# Patient Record
Sex: MALE | Race: BLACK OR AFRICAN AMERICAN
[De-identification: names, ages, dates, MRNs, and addresses within clinical notes are randomized per-mention and may not be internally consistent; named-entity substitution may affect disease eponyms.]

---

## 2017-02-27 ENCOUNTER — PRIOR ORIGINAL RECORDS (OUTPATIENT)
Dept: OTHER | Age: 67
End: 2017-02-27

## 2017-03-02 ENCOUNTER — APPOINTMENT (OUTPATIENT)
Dept: GENERAL RADIOLOGY | Facility: HOSPITAL | Age: 67
End: 2017-03-02
Payer: MEDICARE

## 2017-03-02 ENCOUNTER — HOSPITAL ENCOUNTER (EMERGENCY)
Facility: HOSPITAL | Age: 67
Discharge: HOME | End: 2017-03-02
Payer: MEDICARE

## 2017-03-02 VITALS
HEART RATE: 76 BPM | BODY MASS INDEX: 30.8 KG/M2 | RESPIRATION RATE: 16 BRPM | TEMPERATURE: 100 F | WEIGHT: 240 LBS | HEIGHT: 74 IN | OXYGEN SATURATION: 95 % | SYSTOLIC BLOOD PRESSURE: 130 MMHG | DIASTOLIC BLOOD PRESSURE: 80 MMHG

## 2017-03-02 DIAGNOSIS — J40 BRONCHITIS: Primary | ICD-10-CM

## 2017-03-02 DIAGNOSIS — J98.01 ACUTE BRONCHOSPASM: ICD-10-CM

## 2017-03-02 LAB
ADENOVIRUS PCR:: NEGATIVE
B PERT DNA SPEC QL NAA+PROBE: NEGATIVE
C PNEUM DNA SPEC QL NAA+PROBE: NEGATIVE
CORONAVIRUS 229E PCR:: NEGATIVE
CORONAVIRUS HKU1 PCR:: NEGATIVE
CORONAVIRUS NL63 PCR:: NEGATIVE
CORONAVIRUS OC43 PCR:: NEGATIVE
FLUAV H1 2009 PAND RNA SPEC QL NAA+PROBE: NEGATIVE
FLUAV H1 RNA SPEC QL NAA+PROBE: NEGATIVE
FLUAV H3 RNA SPEC QL NAA+PROBE: POSITIVE
FLUAV RNA SPEC QL NAA+PROBE: POSITIVE
FLUBV RNA SPEC QL NAA+PROBE: NEGATIVE
METAPNEUMOVIRUS PCR:: NEGATIVE
MYCOPLASMA PNEUMONIA PCR:: NEGATIVE
PARAINFLUENZA 1 PCR:: NEGATIVE
PARAINFLUENZA 2 PCR:: NEGATIVE
PARAINFLUENZA 3 PCR:: NEGATIVE
PARAINFLUENZA 4 PCR:: NEGATIVE
RHINOVIRUS/ENTERO PCR:: NEGATIVE
RSV RNA SPEC QL NAA+PROBE: NEGATIVE

## 2017-03-02 PROCEDURE — 99284 EMERGENCY DEPT VISIT MOD MDM: CPT

## 2017-03-02 PROCEDURE — 87486 CHLMYD PNEUM DNA AMP PROBE: CPT | Performed by: EMERGENCY MEDICINE

## 2017-03-02 PROCEDURE — 87581 M.PNEUMON DNA AMP PROBE: CPT | Performed by: EMERGENCY MEDICINE

## 2017-03-02 PROCEDURE — 87633 RESP VIRUS 12-25 TARGETS: CPT | Performed by: EMERGENCY MEDICINE

## 2017-03-02 PROCEDURE — 71020 XR CHEST PA + LAT CHEST (CPT=71020): CPT

## 2017-03-02 PROCEDURE — 87798 DETECT AGENT NOS DNA AMP: CPT | Performed by: EMERGENCY MEDICINE

## 2017-03-02 PROCEDURE — 94640 AIRWAY INHALATION TREATMENT: CPT

## 2017-03-02 RX ORDER — LOSARTAN POTASSIUM 100 MG/1
TABLET ORAL DAILY
COMMUNITY
End: 2017-10-30 | Stop reason: DRUGHIGH

## 2017-03-02 RX ORDER — IPRATROPIUM BROMIDE AND ALBUTEROL SULFATE 2.5; .5 MG/3ML; MG/3ML
3 SOLUTION RESPIRATORY (INHALATION) ONCE
Status: COMPLETED | OUTPATIENT
Start: 2017-03-02 | End: 2017-03-02

## 2017-03-02 RX ORDER — POTASSIUM CITRATE 10 MEQ/1
TABLET, EXTENDED RELEASE ORAL
COMMUNITY
End: 2017-10-30 | Stop reason: DRUGHIGH

## 2017-03-02 RX ORDER — MECLIZINE HYDROCHLORIDE 25 MG/1
25 TABLET ORAL 3 TIMES DAILY PRN
COMMUNITY
End: 2018-04-13 | Stop reason: ALTCHOICE

## 2017-03-02 RX ORDER — PANTOPRAZOLE SODIUM 40 MG/1
40 TABLET, DELAYED RELEASE ORAL
COMMUNITY
End: 2017-10-30

## 2017-03-02 RX ORDER — ACETAMINOPHEN 500 MG
1000 TABLET ORAL ONCE
Status: COMPLETED | OUTPATIENT
Start: 2017-03-02 | End: 2017-03-02

## 2017-03-02 RX ORDER — FUROSEMIDE 40 MG/1
40 TABLET ORAL 2 TIMES DAILY
Status: ON HOLD | COMMUNITY
End: 2018-02-23

## 2017-03-02 RX ORDER — TAMSULOSIN HYDROCHLORIDE 0.4 MG/1
CAPSULE ORAL DAILY
COMMUNITY
End: 2017-10-30

## 2017-03-02 RX ORDER — METOPROLOL SUCCINATE 100 MG/1
150 TABLET, EXTENDED RELEASE ORAL DAILY
COMMUNITY
End: 2017-10-30 | Stop reason: DRUGHIGH

## 2017-03-02 RX ORDER — NITROGLYCERIN 0.4 MG/1
0.4 TABLET SUBLINGUAL EVERY 5 MIN PRN
COMMUNITY
End: 2018-03-01

## 2017-03-02 RX ORDER — ACETAMINOPHEN AND CODEINE PHOSPHATE 300; 30 MG/1; MG/1
1 TABLET ORAL EVERY 4 HOURS PRN
COMMUNITY
End: 2017-10-30

## 2017-03-02 RX ORDER — SIMVASTATIN 5 MG
80 TABLET ORAL NIGHTLY
COMMUNITY
End: 2017-10-30 | Stop reason: DRUGHIGH

## 2017-03-02 RX ORDER — DIPHENHYDRAMINE HCL 25 MG
25 CAPSULE ORAL EVERY 6 HOURS PRN
COMMUNITY
End: 2018-04-13 | Stop reason: ALTCHOICE

## 2017-03-02 RX ORDER — PREDNISONE 20 MG/1
40 TABLET ORAL DAILY
Qty: 6 TABLET | Refills: 0 | Status: SHIPPED | OUTPATIENT
Start: 2017-03-02 | End: 2017-03-05

## 2017-03-02 NOTE — ED INITIAL ASSESSMENT (HPI)
Pt states 3 days ago he had some abdominal pain with associated cough but no fever. The following day the abdominal pain resolved spontaneously but the cough persisted. He states the cough is worse now since it has become productive of whitish phlegm.

## 2017-03-03 NOTE — ED PROVIDER NOTES
Patient Seen in: [facility] AND [facility] Emergency Department    History   Patient presents with:  Cough    Stated Complaint: cough    HPI    73 y/o male w/ a cardiac hx on aspirin with a h/o asthma w/ home inhaler who initially 3 days ago had some epigastric 0.4 mg under the tongue every 5 (five) minutes as needed for Chest pain. predniSONE 20 MG Oral Tab,  Take 2 tablets (40 mg total) by mouth daily. No family history on file.       Smoking Status: Never Smoker                        Review of Systems (cpt=71020)    3/2/2017  PROCEDURE: XR CHEST PA + LAT CHEST (CPT=71020)  COMPARISON: [facility], X CHEST PORTABLE, 2/15/2015, 23:56. INDICATIONS: Dyspnea and productive cough x3 days. TECHNIQUE:   Two views.    FINDINGS: CARDIAC/VASC: Ther

## 2017-03-03 NOTE — ED NOTES
PT safe to DC home per MD. Rina Point to dress self. DC teaching done, pt verbalizes understanding. Ambulatory with steady gait to exit.

## 2017-04-20 ENCOUNTER — PRIOR ORIGINAL RECORDS (OUTPATIENT)
Dept: OTHER | Age: 67
End: 2017-04-20

## 2017-05-08 ENCOUNTER — PRIOR ORIGINAL RECORDS (OUTPATIENT)
Dept: OTHER | Age: 67
End: 2017-05-08

## 2017-05-08 ENCOUNTER — MYAURORA ACCOUNT LINK (OUTPATIENT)
Dept: OTHER | Age: 67
End: 2017-05-08

## 2017-08-28 ENCOUNTER — PRIOR ORIGINAL RECORDS (OUTPATIENT)
Dept: OTHER | Age: 67
End: 2017-08-28

## 2017-09-22 ENCOUNTER — LAB ENCOUNTER (OUTPATIENT)
Dept: LAB | Facility: HOSPITAL | Age: 67
End: 2017-09-22
Attending: INTERNAL MEDICINE
Payer: MEDICARE

## 2017-09-22 DIAGNOSIS — E78.00 ELEVATED CHOLESTEROL: Primary | ICD-10-CM

## 2017-09-22 LAB
ALT SERPL-CCNC: 14 U/L (ref 17–63)
ANION GAP SERPL CALC-SCNC: 7 MMOL/L (ref 0–18)
AST SERPL-CCNC: 20 U/L (ref 15–41)
BUN SERPL-MCNC: 11 MG/DL (ref 8–20)
BUN/CREAT SERPL: 11.3 (ref 10–20)
CALCIUM SERPL-MCNC: 8.9 MG/DL (ref 8.5–10.5)
CHLORIDE SERPL-SCNC: 105 MMOL/L (ref 95–110)
CHOLEST SERPL-MCNC: 90 MG/DL (ref 110–200)
CO2 SERPL-SCNC: 26 MMOL/L (ref 22–32)
CREAT SERPL-MCNC: 0.97 MG/DL (ref 0.5–1.5)
GLUCOSE SERPL-MCNC: 80 MG/DL (ref 70–99)
HDLC SERPL-MCNC: 32 MG/DL
LDLC SERPL CALC-MCNC: 32 MG/DL (ref 0–99)
NONHDLC SERPL-MCNC: 58 MG/DL
OSMOLALITY UR CALC.SUM OF ELEC: 284 MOSM/KG (ref 275–295)
POTASSIUM SERPL-SCNC: 3.8 MMOL/L (ref 3.3–5.1)
SODIUM SERPL-SCNC: 138 MMOL/L (ref 136–144)
TRIGL SERPL-MCNC: 128 MG/DL (ref 1–149)

## 2017-09-22 PROCEDURE — 80048 BASIC METABOLIC PNL TOTAL CA: CPT

## 2017-09-22 PROCEDURE — 84450 TRANSFERASE (AST) (SGOT): CPT

## 2017-09-22 PROCEDURE — 80061 LIPID PANEL: CPT

## 2017-09-22 PROCEDURE — 84460 ALANINE AMINO (ALT) (SGPT): CPT

## 2017-09-22 PROCEDURE — 36415 COLL VENOUS BLD VENIPUNCTURE: CPT

## 2017-10-30 ENCOUNTER — OFFICE VISIT (OUTPATIENT)
Dept: FAMILY MEDICINE CLINIC | Facility: CLINIC | Age: 67
End: 2017-10-30

## 2017-10-30 VITALS
RESPIRATION RATE: 16 BRPM | HEIGHT: 74 IN | SYSTOLIC BLOOD PRESSURE: 132 MMHG | DIASTOLIC BLOOD PRESSURE: 78 MMHG | HEART RATE: 54 BPM | BODY MASS INDEX: 32.34 KG/M2 | TEMPERATURE: 97 F | WEIGHT: 252 LBS

## 2017-10-30 DIAGNOSIS — I10 ESSENTIAL HYPERTENSION: Primary | ICD-10-CM

## 2017-10-30 DIAGNOSIS — Z95.810 PRESENCE OF CARDIAC DEFIBRILLATOR: ICD-10-CM

## 2017-10-30 DIAGNOSIS — E78.2 MIXED HYPERLIPIDEMIA: ICD-10-CM

## 2017-10-30 DIAGNOSIS — K21.9 GASTROESOPHAGEAL REFLUX DISEASE, ESOPHAGITIS PRESENCE NOT SPECIFIED: ICD-10-CM

## 2017-10-30 DIAGNOSIS — N40.0 BENIGN PROSTATIC HYPERPLASIA, UNSPECIFIED WHETHER LOWER URINARY TRACT SYMPTOMS PRESENT: ICD-10-CM

## 2017-10-30 DIAGNOSIS — N20.0 RENAL STONES: ICD-10-CM

## 2017-10-30 DIAGNOSIS — I25.2 HISTORY OF MYOCARDIAL INFARCT AT AGE LESS THAN 60 YEARS: ICD-10-CM

## 2017-10-30 PROCEDURE — 99214 OFFICE O/P EST MOD 30 MIN: CPT | Performed by: FAMILY MEDICINE

## 2017-10-30 PROCEDURE — G0463 HOSPITAL OUTPT CLINIC VISIT: HCPCS | Performed by: FAMILY MEDICINE

## 2017-10-30 RX ORDER — POTASSIUM CHLORIDE 750 MG/1
10 TABLET, EXTENDED RELEASE ORAL 2 TIMES DAILY
Status: ON HOLD | COMMUNITY
End: 2018-02-23

## 2017-10-30 RX ORDER — ACETAMINOPHEN AND CODEINE PHOSPHATE 300; 30 MG/1; MG/1
1 TABLET ORAL EVERY 4 HOURS PRN
Qty: 80 TABLET | Refills: 0 | Status: SHIPPED | OUTPATIENT
Start: 2017-10-30 | End: 2018-04-13 | Stop reason: ALTCHOICE

## 2017-10-30 RX ORDER — TAMSULOSIN HYDROCHLORIDE 0.4 MG/1
0.4 CAPSULE ORAL DAILY
Qty: 90 CAPSULE | Refills: 1 | Status: SHIPPED | OUTPATIENT
Start: 2017-10-30 | End: 2018-03-21

## 2017-10-30 RX ORDER — PANTOPRAZOLE SODIUM 40 MG/1
40 TABLET, DELAYED RELEASE ORAL
Qty: 90 TABLET | Refills: 1 | Status: SHIPPED | OUTPATIENT
Start: 2017-10-30 | End: 2018-03-21

## 2017-10-30 RX ORDER — LOSARTAN POTASSIUM 100 MG/1
100 TABLET ORAL DAILY
Qty: 90 TABLET | Refills: 1 | Status: SHIPPED | OUTPATIENT
Start: 2017-10-30 | End: 2018-04-17

## 2017-10-30 RX ORDER — LOSARTAN POTASSIUM 100 MG/1
TABLET ORAL DAILY
COMMUNITY
End: 2017-10-30

## 2017-10-30 RX ORDER — METOPROLOL SUCCINATE 100 MG/1
150 TABLET, EXTENDED RELEASE ORAL DAILY
COMMUNITY
End: 2018-10-24

## 2017-10-30 RX ORDER — SIMVASTATIN 80 MG
80 TABLET ORAL NIGHTLY
Qty: 90 TABLET | Refills: 1 | Status: SHIPPED | OUTPATIENT
Start: 2017-10-30 | End: 2017-12-17

## 2017-10-30 RX ORDER — SIMVASTATIN 80 MG
80 TABLET ORAL NIGHTLY
COMMUNITY
End: 2017-10-30

## 2017-10-30 NOTE — PATIENT INSTRUCTIONS
Recommend weight loss via daily exercising and consistent healthy dietary changes. Monitor blood pressures and record at home. Limit salt intake. Comply with medications. Medication reviewed and renewed where needed and appropriate.

## 2017-11-01 PROBLEM — Z00.00 ENCOUNTER FOR MEDICAL EXAMINATION TO ESTABLISH CARE: Status: ACTIVE | Noted: 2017-11-01

## 2017-11-02 NOTE — PROGRESS NOTES
HPI:    Patient ID: Lenore Lockhart is a 69year old male. 69year old AA male former patient here to re-establish care. Known hypertensive with history of MI x 2. Currently has a defibrillator as a result of a few episodes of syncope.  Treated for BPH Pt reports only takes 1/2 tablet. Disp:  Rfl:    Meclizine HCl 25 MG Oral Tab Take 25 mg by mouth 3 (three) times daily as needed.  Disp:  Rfl:    nitroGLYCERIN 0.4 MG Sublingual SL Tab Place 0.4 mg under the tongue every 5 (five) minutes as needed for [employer] cardiac defibrillator  Stable; under cardiology care. 6. Gastroesophageal reflux disease, esophagitis presence not specified  Refilled  - Pantoprazole Sodium 40 MG Oral Tab EC;  Take 1 tablet (40 mg total) by mouth every morning before breakfast.  Hal Finch

## 2017-12-04 ENCOUNTER — PRIOR ORIGINAL RECORDS (OUTPATIENT)
Dept: OTHER | Age: 67
End: 2017-12-04

## 2017-12-04 ENCOUNTER — LAB ENCOUNTER (OUTPATIENT)
Dept: LAB | Facility: HOSPITAL | Age: 67
End: 2017-12-04
Attending: INTERNAL MEDICINE
Payer: MEDICARE

## 2017-12-04 DIAGNOSIS — I20.0 INTERMEDIATE CORONARY SYNDROME (HCC): ICD-10-CM

## 2017-12-04 DIAGNOSIS — E78.00 PURE HYPERCHOLESTEROLEMIA: Primary | ICD-10-CM

## 2017-12-04 PROCEDURE — 84460 ALANINE AMINO (ALT) (SGPT): CPT

## 2017-12-04 PROCEDURE — 36415 COLL VENOUS BLD VENIPUNCTURE: CPT

## 2017-12-04 PROCEDURE — 80061 LIPID PANEL: CPT

## 2017-12-04 PROCEDURE — 80048 BASIC METABOLIC PNL TOTAL CA: CPT

## 2017-12-04 PROCEDURE — 84450 TRANSFERASE (AST) (SGOT): CPT

## 2017-12-07 ENCOUNTER — PATIENT MESSAGE (OUTPATIENT)
Dept: FAMILY MEDICINE CLINIC | Facility: CLINIC | Age: 67
End: 2017-12-07

## 2017-12-08 NOTE — TELEPHONE ENCOUNTER
From: Sara Horn  To: Tre Rock DO  Sent: 12/7/2017 9:41 PM CST  Subject: Non-Urgent Medical Question    I have a question about ALT (SGPT) resulted on 12/4/17, 10:48 AM.    Do I have anything to be concerned about?    What is considered no

## 2017-12-08 NOTE — TELEPHONE ENCOUNTER
From: Yennifer Fernando  To: Oh Jackson DO  Sent: 12/7/2017 9:39 PM CST  Subject: Non-Urgent Medical Question    I have a question about AST (SGOT) resulted on 12/4/17, 10:48 AM.    What is considered normal test results? Am I OK ?

## 2017-12-17 DIAGNOSIS — E78.2 MIXED HYPERLIPIDEMIA: ICD-10-CM

## 2017-12-19 RX ORDER — SIMVASTATIN 80 MG
80 TABLET ORAL NIGHTLY
Qty: 90 TABLET | Refills: 1 | Status: SHIPPED | OUTPATIENT
Start: 2017-12-19 | End: 2018-10-04

## 2017-12-19 NOTE — TELEPHONE ENCOUNTER
From: Adriano Wood  Sent: 12/17/2017 8:35 PM CST  Subject: Medication Renewal Request    Trey Holden would like a refill of the following medications:     simvastatin 80 MG Oral Tab Madeline Rm DO]   Patient Comment: Only six tablets le

## 2017-12-19 NOTE — TELEPHONE ENCOUNTER
Cholesterol Medications  Protocol Criteria:  · Appointment scheduled in the past 12 months or in the next 3 months  · ALT & LDL on file in the past 12 months  · ALT result < 80  · LDL result <130   Recent Outpatient Visits            1 month ago [facility]

## 2018-01-29 ENCOUNTER — MYAURORA ACCOUNT LINK (OUTPATIENT)
Dept: OTHER | Age: 68
End: 2018-01-29

## 2018-01-31 ENCOUNTER — PRIOR ORIGINAL RECORDS (OUTPATIENT)
Dept: OTHER | Age: 68
End: 2018-01-31

## 2018-02-01 ENCOUNTER — OFFICE VISIT (OUTPATIENT)
Dept: FAMILY MEDICINE CLINIC | Facility: CLINIC | Age: 68
End: 2018-02-01

## 2018-02-01 VITALS
BODY MASS INDEX: 32.6 KG/M2 | HEIGHT: 74 IN | SYSTOLIC BLOOD PRESSURE: 140 MMHG | WEIGHT: 254 LBS | HEART RATE: 57 BPM | DIASTOLIC BLOOD PRESSURE: 80 MMHG | RESPIRATION RATE: 16 BRPM | TEMPERATURE: 98 F

## 2018-02-01 DIAGNOSIS — E78.5 HYPERLIPIDEMIA, UNSPECIFIED HYPERLIPIDEMIA TYPE: ICD-10-CM

## 2018-02-01 DIAGNOSIS — I10 ESSENTIAL HYPERTENSION: Primary | ICD-10-CM

## 2018-02-01 PROCEDURE — 99214 OFFICE O/P EST MOD 30 MIN: CPT | Performed by: FAMILY MEDICINE

## 2018-02-01 PROCEDURE — G0463 HOSPITAL OUTPT CLINIC VISIT: HCPCS | Performed by: FAMILY MEDICINE

## 2018-02-01 NOTE — PROGRESS NOTES
HPI:    Patient ID: Phu Canela is a 69year old male. Hypertension   This is a chronic problem. The current episode started more than 1 year ago. The problem is controlled.  Pertinent negatives include no chest pain, headaches, orthopnea, palpitati Oral Tab EC Take 1 tablet (40 mg total) by mouth every morning before breakfast. Disp: 90 tablet Rfl: 1   Acetaminophen-Codeine 300-30 MG Oral Tab Take 1 tablet by mouth every 4 (four) hours as needed for Pain.  Disp: 80 tablet Rfl: 0   aspirin 81 MG Oral T pressures and record at home. Limit salt intake. Recommend weight loss via daily exercising and consistent healthy dietary changes. Keep cardiology appointments. Return in about 3 months (around 5/1/2018), or if symptoms worsen or fail to improve.

## 2018-02-01 NOTE — PATIENT INSTRUCTIONS
Comply with medications. Medication reviewed and renewed where needed and appropriate. Monitor blood pressures and record at home. Limit salt intake. Recommend weight loss via daily exercising and consistent healthy dietary changes.   Keep cardiology courtney

## 2018-02-03 ENCOUNTER — PATIENT MESSAGE (OUTPATIENT)
Dept: FAMILY MEDICINE CLINIC | Facility: CLINIC | Age: 68
End: 2018-02-03

## 2018-02-04 ENCOUNTER — NURSE TRIAGE (OUTPATIENT)
Dept: OTHER | Age: 68
End: 2018-02-04

## 2018-02-04 NOTE — TELEPHONE ENCOUNTER
From: [facility]  To: Reyes Puller, DO  Sent: 2/3/2018 9:30 PM CST  Subject: Referral Request    Do I need a referral to get a flu shot? Since my visit to the doctor I have been at home with frequent visits to the rest room.  I have a virus of

## 2018-02-04 NOTE — TELEPHONE ENCOUNTER
Referral Request     From  Angi Hall To Sent  2/3/2018  9:30 PM   Do I need a referral to get a flu shot? Since my visit to the doctor I have been at home with frequent visits to the rest room. I have a virus of some kind I do not know what it is.

## 2018-02-05 LAB
ALT (SGPT): 30 U/L
AST (SGOT): 28 U/L
BUN: 9 MG/DL
CHOLESTEROL, TOTAL: 87 MG/DL
CREATININE, SERUM: 1.12 MG/DL
GLUCOSE: 96 MG/DL
HDL CHOLESTEROL: 33 MG/DL
LDL CHOLESTEROL: 37 MG/DL
POTASSIUM, SERUM: 3.6 MEQ/L
SGOT (AST): 28 IU/L
SGPT (ALT): 30 IU/L
SODIUM: 137 MEQ/L
TRIGLYCERIDES: 84 MG/DL

## 2018-02-05 NOTE — TELEPHONE ENCOUNTER
DR. Delia Recinos - pt asked if you advise that he get a flu vaccine. He states that he mentioned it at the [address] [place] [address] but he was not sure if you wanted him to get it. He says he is a  and works with a lot of people.  He said he will get a flu shot if you recommend it

## 2018-02-05 NOTE — TELEPHONE ENCOUNTER
Action Requested: Summary for Provider     []  Critical Lab, Recommendations Needed  [x] Need Additional Advice  []   FYI    []   Need Orders  [] Need Medications Sent to Pharmacy  []  Other     SUMMARY: Pt states loose stool symptoms have improved.  He sta

## 2018-02-05 NOTE — TELEPHONE ENCOUNTER
Reason for Disposition  • MILD-MODERATE diarrhea (e.g., 1-6 times / day more than normal)    Protocols used: Candie Guevara

## 2018-02-05 NOTE — TELEPHONE ENCOUNTER
LMTCB. When pt calls back, please let him know Dr. Hermila Doran ordered the flu vaccine and schedule a nurse visit for him to receive it.

## 2018-02-06 ENCOUNTER — NURSE ONLY (OUTPATIENT)
Dept: FAMILY MEDICINE CLINIC | Facility: CLINIC | Age: 68
End: 2018-02-06

## 2018-02-06 PROCEDURE — 90653 IIV ADJUVANT VACCINE IM: CPT | Performed by: FAMILY MEDICINE

## 2018-02-06 PROCEDURE — G0008 ADMIN INFLUENZA VIRUS VAC: HCPCS | Performed by: FAMILY MEDICINE

## 2018-02-12 ENCOUNTER — PRIOR ORIGINAL RECORDS (OUTPATIENT)
Dept: OTHER | Age: 68
End: 2018-02-12

## 2018-02-12 ENCOUNTER — PATIENT MESSAGE (OUTPATIENT)
Dept: FAMILY MEDICINE CLINIC | Facility: CLINIC | Age: 68
End: 2018-02-12

## 2018-02-13 ENCOUNTER — TELEPHONE (OUTPATIENT)
Dept: OTHER | Age: 68
End: 2018-02-13

## 2018-02-13 DIAGNOSIS — Z45.018 PACEMAKER REPROGRAMMING/CHECK: Primary | ICD-10-CM

## 2018-02-13 NOTE — TELEPHONE ENCOUNTER
LMTCFATUMA.  Was calling patient to find out diagnosis for referral.         From: Leslie Reynoso  To: Osito Garcia DO  Sent: 2/12/2018 10:09 AM CST  Subject: Referral Request    I need a referral From Dr. Pao Wnag to see the cardiologist Dr. Jennifer Abel

## 2018-02-13 NOTE — TELEPHONE ENCOUNTER
From: Thee Steve  To: Jacquie Rosales DO  Sent: 2/12/2018 10:09 AM CST  Subject: Referral Request    I need a referral From Dr. eLa Hawkins to see the cardiologist Dr. Jorge Gunter today at 10:30am.

## 2018-02-13 NOTE — TELEPHONE ENCOUNTER
Spoke with patient and states he saw Dr. Tamia Barrios yesterday for defibrillator check. His insurance changed and did not know until yesterday that a referral was needed. Patient has been seeing Dr. Tamia Barrios since early 2015.     Patient states he does not

## 2018-02-15 NOTE — TELEPHONE ENCOUNTER
Informed pt that referral has been completed and is awaiting approval and will be mailed once approved. Pt voiced understanding.

## 2018-02-19 ENCOUNTER — MYAURORA ACCOUNT LINK (OUTPATIENT)
Dept: OTHER | Age: 68
End: 2018-02-19

## 2018-02-22 ENCOUNTER — PATIENT MESSAGE (OUTPATIENT)
Dept: FAMILY MEDICINE CLINIC | Facility: CLINIC | Age: 68
End: 2018-02-22

## 2018-02-23 ENCOUNTER — SURGERY (OUTPATIENT)
Age: 68
End: 2018-02-23

## 2018-02-23 ENCOUNTER — ANESTHESIA (OUTPATIENT)
Dept: ENDOSCOPY | Facility: HOSPITAL | Age: 68
DRG: 378 | End: 2018-02-23
Payer: MEDICARE

## 2018-02-23 ENCOUNTER — HOSPITAL ENCOUNTER (INPATIENT)
Facility: HOSPITAL | Age: 68
LOS: 2 days | Discharge: HOME | DRG: 378 | End: 2018-02-28
Attending: EMERGENCY MEDICINE | Admitting: HOSPITALIST
Payer: MEDICARE

## 2018-02-23 ENCOUNTER — ANESTHESIA EVENT (OUTPATIENT)
Dept: ENDOSCOPY | Facility: HOSPITAL | Age: 68
DRG: 378 | End: 2018-02-23
Payer: MEDICARE

## 2018-02-23 DIAGNOSIS — K62.5 RECTAL BLEEDING: Primary | ICD-10-CM

## 2018-02-23 LAB
ADENOVIRUS F 40/41 PCR: NEGATIVE
ALBUMIN SERPL BCP-MCNC: 3.7 G/DL (ref 3.5–4.8)
ALBUMIN/GLOB SERPL: 1.4 {RATIO} (ref 1–2)
ALP SERPL-CCNC: 59 U/L (ref 32–100)
ALT SERPL-CCNC: 16 U/L (ref 17–63)
ANION GAP SERPL CALC-SCNC: 3 MMOL/L (ref 0–18)
ANION GAP SERPL CALC-SCNC: 5 MMOL/L (ref 0–18)
ANTIBODY SCREEN: NEGATIVE
APTT PPP: 31.4 SECONDS (ref 23.2–35.3)
AST SERPL-CCNC: 25 U/L (ref 15–41)
ASTROVIRUS PCR: NEGATIVE
BASOPHILS # BLD: 0 K/UL (ref 0–0.2)
BASOPHILS # BLD: 0 K/UL (ref 0–0.2)
BASOPHILS NFR BLD: 1 %
BASOPHILS NFR BLD: 1 %
BILIRUB SERPL-MCNC: 0.4 MG/DL (ref 0.3–1.2)
BUN SERPL-MCNC: 11 MG/DL (ref 8–20)
BUN SERPL-MCNC: 12 MG/DL (ref 8–20)
BUN/CREAT SERPL: 11 (ref 10–20)
BUN/CREAT SERPL: 12.1 (ref 10–20)
C CAYETANENSIS DNA SPEC QL NAA+PROBE: NEGATIVE
C. DIFFICILE TOXIN A/B PCR: NEGATIVE
CALCIUM SERPL-MCNC: 8.5 MG/DL (ref 8.5–10.5)
CALCIUM SERPL-MCNC: 8.7 MG/DL (ref 8.5–10.5)
CAMPY SP DNA.DIARRHEA STL QL NAA+PROBE: NEGATIVE
CHLORIDE SERPL-SCNC: 108 MMOL/L (ref 95–110)
CHLORIDE SERPL-SCNC: 110 MMOL/L (ref 95–110)
CO2 SERPL-SCNC: 26 MMOL/L (ref 22–32)
CO2 SERPL-SCNC: 28 MMOL/L (ref 22–32)
CREAT SERPL-MCNC: 0.99 MG/DL (ref 0.5–1.5)
CREAT SERPL-MCNC: 1 MG/DL (ref 0.5–1.5)
CRYPTOSP DNA SPEC QL NAA+PROBE: NEGATIVE
EAEC PAA PLAS AGGR+AATA ST NAA+NON-PRB: NEGATIVE
EC STX1+STX2 + H7 FLIC SPEC NAA+PROBE: NEGATIVE
ENTAMOEBA HISTOLYTICA PCR: NEGATIVE
EOSINOPHIL # BLD: 0.2 K/UL (ref 0–0.7)
EOSINOPHIL # BLD: 0.3 K/UL (ref 0–0.7)
EOSINOPHIL NFR BLD: 5 %
EOSINOPHIL NFR BLD: 5 %
EPEC EAE GENE STL QL NAA+NON-PROBE: NEGATIVE
ERYTHROCYTE [DISTWIDTH] IN BLOOD BY AUTOMATED COUNT: 14.6 % (ref 11–15)
ERYTHROCYTE [DISTWIDTH] IN BLOOD BY AUTOMATED COUNT: 14.7 % (ref 11–15)
ETEC LTA+ST1A+ST1B TOX ST NAA+NON-PROBE: NEGATIVE
GIARDIA LAMBLIA PCR: NEGATIVE
GLOBULIN PLAS-MCNC: 2.7 G/DL (ref 2.5–3.7)
GLUCOSE SERPL-MCNC: 114 MG/DL (ref 70–99)
GLUCOSE SERPL-MCNC: 130 MG/DL (ref 70–99)
HCT VFR BLD AUTO: 36.1 % (ref 41–52)
HCT VFR BLD AUTO: 36.5 % (ref 41–52)
HCT VFR BLD AUTO: 38.3 % (ref 41–52)
HEMOCCULT STL QL: NEGATIVE
HGB BLD-MCNC: 12.3 G/DL (ref 13.5–17.5)
HGB BLD-MCNC: 12.4 G/DL (ref 13.5–17.5)
HGB BLD-MCNC: 12.8 G/DL (ref 13.5–17.5)
INR BLD: 1.1 (ref 0.9–1.2)
INR BLD: 1.1 (ref 0.9–1.2)
LYMPHOCYTES # BLD: 1.9 K/UL (ref 1–4)
LYMPHOCYTES # BLD: 2 K/UL (ref 1–4)
LYMPHOCYTES NFR BLD: 36 %
LYMPHOCYTES NFR BLD: 37 %
MCH RBC QN AUTO: 27.7 PG (ref 27–32)
MCH RBC QN AUTO: 27.8 PG (ref 27–32)
MCHC RBC AUTO-ENTMCNC: 33.9 G/DL (ref 32–37)
MCHC RBC AUTO-ENTMCNC: 34 G/DL (ref 32–37)
MCV RBC AUTO: 81.8 FL (ref 80–100)
MCV RBC AUTO: 81.9 FL (ref 80–100)
MONOCYTES # BLD: 0.6 K/UL (ref 0–1)
MONOCYTES # BLD: 0.6 K/UL (ref 0–1)
MONOCYTES NFR BLD: 11 %
MONOCYTES NFR BLD: 12 %
NEUTROPHILS # BLD AUTO: 2.4 K/UL (ref 1.8–7.7)
NEUTROPHILS # BLD AUTO: 2.5 K/UL (ref 1.8–7.7)
NEUTROPHILS NFR BLD: 46 %
NEUTROPHILS NFR BLD: 47 %
NOROVIRUS GI/GII PCR: NEGATIVE
OSMOLALITY UR CALC.SUM OF ELEC: 290 MOSM/KG (ref 275–295)
OSMOLALITY UR CALC.SUM OF ELEC: 292 MOSM/KG (ref 275–295)
P SHIGELLOIDES DNA STL QL NAA+PROBE: NEGATIVE
PLATELET # BLD AUTO: 201 K/UL (ref 140–400)
PLATELET # BLD AUTO: 221 K/UL (ref 140–400)
PMV BLD AUTO: 6.9 FL (ref 7.4–10.3)
PMV BLD AUTO: 7.2 FL (ref 7.4–10.3)
POTASSIUM SERPL-SCNC: 3.5 MMOL/L (ref 3.3–5.1)
POTASSIUM SERPL-SCNC: 3.6 MMOL/L (ref 3.3–5.1)
PROT SERPL-MCNC: 6.4 G/DL (ref 5.9–8.4)
PROTHROMBIN TIME: 13.4 SECONDS (ref 11.8–14.5)
PROTHROMBIN TIME: 13.4 SECONDS (ref 11.8–14.5)
RBC # BLD AUTO: 4.42 M/UL (ref 4.5–5.9)
RBC # BLD AUTO: 4.46 M/UL (ref 4.5–5.9)
RH BLOOD TYPE: POSITIVE
ROTAVIRUS A PCR: NEGATIVE
SALMONELLA DNA SPEC QL NAA+PROBE: NEGATIVE
SAPOVIRUS PCR: NEGATIVE
SHIGELLA SP+EIEC IPAH ST NAA+NON-PROBE: NEGATIVE
SODIUM SERPL-SCNC: 139 MMOL/L (ref 136–144)
SODIUM SERPL-SCNC: 141 MMOL/L (ref 136–144)
V CHOLERAE DNA SPEC QL NAA+PROBE: NEGATIVE
VIBRIO DNA SPEC NAA+PROBE: NEGATIVE
WBC # BLD AUTO: 5.2 K/UL (ref 4–11)
WBC # BLD AUTO: 5.4 K/UL (ref 4–11)
YERSINIA DNA SPEC NAA+PROBE: NEGATIVE

## 2018-02-23 PROCEDURE — 99221 1ST HOSP IP/OBS SF/LOW 40: CPT | Performed by: INTERNAL MEDICINE

## 2018-02-23 PROCEDURE — 0DJD8ZZ INSPECTION OF LOWER INTESTINAL TRACT, VIA NATURAL OR ARTIFICIAL OPENING ENDOSCOPIC: ICD-10-PCS | Performed by: INTERNAL MEDICINE

## 2018-02-23 PROCEDURE — 99223 1ST HOSP IP/OBS HIGH 75: CPT | Performed by: HOSPITALIST

## 2018-02-23 RX ORDER — ONDANSETRON 2 MG/ML
4 INJECTION INTRAMUSCULAR; INTRAVENOUS EVERY 6 HOURS PRN
Status: DISCONTINUED | OUTPATIENT
Start: 2018-02-23 | End: 2018-02-28

## 2018-02-23 RX ORDER — LIDOCAINE HYDROCHLORIDE 10 MG/ML
INJECTION, SOLUTION EPIDURAL; INFILTRATION; INTRACAUDAL; PERINEURAL AS NEEDED
Status: DISCONTINUED | OUTPATIENT
Start: 2018-02-23 | End: 2018-02-23 | Stop reason: SURG

## 2018-02-23 RX ORDER — NALOXONE HYDROCHLORIDE 0.4 MG/ML
80 INJECTION, SOLUTION INTRAMUSCULAR; INTRAVENOUS; SUBCUTANEOUS AS NEEDED
Status: DISCONTINUED | OUTPATIENT
Start: 2018-02-23 | End: 2018-02-23 | Stop reason: HOSPADM

## 2018-02-23 RX ORDER — ONDANSETRON 2 MG/ML
4 INJECTION INTRAMUSCULAR; INTRAVENOUS EVERY 4 HOURS PRN
Status: DISCONTINUED | OUTPATIENT
Start: 2018-02-23 | End: 2018-02-28

## 2018-02-23 RX ORDER — SODIUM CHLORIDE, SODIUM LACTATE, POTASSIUM CHLORIDE, CALCIUM CHLORIDE 600; 310; 30; 20 MG/100ML; MG/100ML; MG/100ML; MG/100ML
INJECTION, SOLUTION INTRAVENOUS CONTINUOUS
Status: DISCONTINUED | OUTPATIENT
Start: 2018-02-23 | End: 2018-02-24

## 2018-02-23 RX ORDER — POTASSIUM CHLORIDE 750 MG/1
10 TABLET, EXTENDED RELEASE ORAL DAILY
COMMUNITY
End: 2018-03-22

## 2018-02-23 RX ORDER — FUROSEMIDE 40 MG/1
40 TABLET ORAL EVERY MORNING
COMMUNITY
End: 2019-05-15

## 2018-02-23 RX ORDER — SODIUM CHLORIDE, SODIUM LACTATE, POTASSIUM CHLORIDE, CALCIUM CHLORIDE 600; 310; 30; 20 MG/100ML; MG/100ML; MG/100ML; MG/100ML
INJECTION, SOLUTION INTRAVENOUS CONTINUOUS PRN
Status: DISCONTINUED | OUTPATIENT
Start: 2018-02-23 | End: 2018-02-23 | Stop reason: SURG

## 2018-02-23 RX ORDER — SODIUM CHLORIDE 9 MG/ML
INJECTION, SOLUTION INTRAVENOUS CONTINUOUS
Status: DISCONTINUED | OUTPATIENT
Start: 2018-02-23 | End: 2018-02-28

## 2018-02-23 RX ADMIN — SODIUM CHLORIDE, SODIUM LACTATE, POTASSIUM CHLORIDE, CALCIUM CHLORIDE: 600; 310; 30; 20 INJECTION, SOLUTION INTRAVENOUS at 15:55:00

## 2018-02-23 RX ADMIN — SODIUM CHLORIDE, SODIUM LACTATE, POTASSIUM CHLORIDE, CALCIUM CHLORIDE: 600; 310; 30; 20 INJECTION, SOLUTION INTRAVENOUS at 16:47:00

## 2018-02-23 RX ADMIN — LIDOCAINE HYDROCHLORIDE 50 MG: 10 INJECTION, SOLUTION EPIDURAL; INFILTRATION; INTRACAUDAL; PERINEURAL at 15:59:00

## 2018-02-23 NOTE — RESPIRATORY THERAPY NOTE
Carlos Enrique Navarro JUNIE ASSESSMENT:    Pt does not have a previous diagnosis of JUNIE. Pt does not routinely use a CPAP device at home. This pt is suspected to be at high risk for JUNIE and sleep lab packet was provided to patient for outpatient follow-up.

## 2018-02-23 NOTE — H&P
History & Physical Examination    Patient Name: Sofya Carey  MRN: [id number]  CSN: [id number]  YOB: 1950    Diagnosis: GI bleeding        Prescriptions Prior to Admission:  furosemide 40 MG Oral Tab Take 40 mg by mouth nightly.  Disp:  Rfl Pantoprazole Sodium (PROTONIX) 40 mg in Sodium Chloride 0.9 % 10 mL IV push 40 mg Intravenous Q24H   lactated ringers infusion  Intravenous Continuous   Atropine Sulfate 0.1 MG/ML injection 0.5 mg 0.5 mg Intravenous PRN   Naloxone HCl (NARCAN) 0.4 MG/ML in

## 2018-02-23 NOTE — ADDENDUM NOTE
Addendum  created 02/23/18 1652 by Kiana Clifford MD    Anesthesia Review and Sign - Ready for Procedure

## 2018-02-23 NOTE — PROGRESS NOTES
Patient seen and examined. Post midnight follow up.     Acute blood loss anemia   secondary to acute blood loss   GI on consult     Ischemic Cardiomyopathy   EF 25%   Status post ICD  Continue remote tele     HTN  Continue home meds     Asthma   Continue ho

## 2018-02-23 NOTE — PLAN OF CARE
Problem: Patient/Family Goals  Goal: Patient/Family Short Term Goal  Patient's Short Term Goal: Find cause of bleeding  Interventions:   - Golytely initiated as ordered  - Consent signed for colonoscopy and poss EGD today  - See additional Care Plan goals

## 2018-02-23 NOTE — PAYOR COMM NOTE
OBSV STATUS    --------------  ADMISSION REVIEW     Payor: [address] [place] [address] #:  [id number]  Authorization Number: N/A    Admit date: N/A  Admit time: N/A       Admitting Physician: Garcia Schneider MD  Attending Physician:  Chelsea Spangler mg total) by mouth every morning before breakfast.   Acetaminophen-Codeine 300-30 MG Oral Tab,  Take 1 tablet by mouth every 4 (four) hours as needed for Pain. aspirin 81 MG Oral Tab,  Take 81 mg by mouth daily.    furosemide 40 MG Oral Tab,  Take 40 mg b hemorrhoids/fissures. No melena, no active bleeding. Musculoskeletal: No gross deformity. Neurological: Alert. Skin: Skin is warm. Psychiatric: Cooperative. Nursing note and vitals reviewed. ED Course[AA. 1]     Labs Reviewed   COMP METABOLIC to[AA. 1] anemia, GIB, hemorrhoids, angiodysplasia, diverticulosis, neoplasm, gastritis, PUD[AA.3]. Pulse ox: 97%:[AA. 1]Normal[AA.3] on[AA. 1] RA[AA.3], as interpreted by myself[AA.1]    Evaluation for painless rectal bleeding without somatic, neurologic/ red blood per rectum. HISTORY OF PRESENT ILLNESS:  This is a very pleasant 20-year-old  with a history of coronary artery disease, hypertension, and hyperlipidemia. He is on aspirin, is not on any Coumadin.   He says that on Wednesday evening as h HISTORY:  Significant for coronary artery disease, status post PCI and stenting in 2004 and 2015. Ischemic cardiomyopathy with an ejection fraction of 25%, status post ICD placement in November 2013.   The patient had syncope in November 2013 secondary to does not smoke, drink alcohol, or use drugs. He works as a . He lives with his wife and is usually independent with his activities of daily living. REVIEW OF SYSTEMS:  Twelve systems were reviewed. The patient's appetite is good.   He says that calcium of 8.5. Anion gap was 3. Liver function tests were essentially normal.  White count was 5.2, hemoglobin 12.5, platelet count 278,124, neutrophils 46%. ASSESSMENT AND PLAN:    1.    Gastrointestinal bleeding, suspect primarily lower gastrointest answered and he agreed with the plan of care as outlined above.     Dictated By Anabelle Trotter MD  d: 02/23/2018 09:17:44  t: 02/23/2018 09:52:01  [facility] [id number]  [facility]/  [CJ.1]   Attribution Key     CJ.1 - Odalys Smiley MD on 2/23

## 2018-02-23 NOTE — OPERATIVE REPORT
[facility] Endoscopy Report      Date of Procedure:  02/23/18      Preoperative Diagnosis:  Gastrointestinal bleeding      Postoperative Diagnosis:  1. Recent lower gastrointestinal bleeding, left-sided, likely diverticular  2.   Nearly pan suctioning were performed. No active bleeding was identified despite multiple passes through the left colon. There were no colonic polyps, mass lesions, vascular anomalies or signs of inflammation seen.   Retroflexion in the rectum revealed incidental int

## 2018-02-23 NOTE — ED PROVIDER NOTES
Patient Seen in: [facility] AND [facility] Emergency Department    History   Patient presents with:  GI Bleeding (gastrointestinal)    Stated Complaint:  Rectal bleeding    HPI    70 yo M with PMH CAD on ASA therapy, HTN, HL presenting for evaluation of two days f file.    Smoking status: Never Smoker                                                              Smokeless tobacco: Never Used                          Review of Systems :  Constitutional: As per HPI  HENT: Negative for ear pain and rhinorrhea.     Eyes: PLATELET    Narrative: The following orders were created for panel order CBC WITH DIFFERENTIAL WITH PLATELET.   Procedure                               Abnormality         Status                     ---------                               ----------- (primary encounter diagnosis)    Disposition:  Admit    Follow-up:  No follow-up provider specified.     Medications Prescribed:  Current Discharge Medication List

## 2018-02-23 NOTE — ANESTHESIA PREPROCEDURE EVALUATION
Anesthesia PreOp Note    HPI:     Shahnaz Emerson is a 69year old male who presents for preoperative consultation requested by: Aram Castillo MD    Date of Surgery: 2/23/2018    Procedure(s):  COLONOSCOPY  Indication: gi bleed      History Review month at Unknown time   DiphenhydrAMINE HCl 25 MG Oral Cap Take 25 mg by mouth every 6 (six) hours as needed for Itching.  Disp:  Rfl:  Taking   nitroGLYCERIN 0.4 MG Sublingual SL Tab Place 0.4 mg under the tongue every 5 (five) minutes as needed for Chest Value Date   INR 1.1 02/23/2018       Vital Signs: Body mass index is 32.98 kg/m². height is 1.854 m (6' 1\") and weight is 113.4 kg (250 lb). His oral temperature is 97.7 °F (36.5 °C). His blood pressure is 137/84 and his pulse is 62.  His respiration i

## 2018-02-23 NOTE — ED NOTES
Patient accompanied by wife for c/o rectal bleeding and abdominal pain. Patient is a/o and does not appear to be in distress- acting appropriate for age.   Patient has been having loose bloody stools for 2 days and became concerned due to his past history

## 2018-02-23 NOTE — ANESTHESIA POSTPROCEDURE EVALUATION
Patient: Lonnie Gotti    Procedure Summary     Date:  02/23/18 Room / Location:  [facility] ENDOSCOPY 05 / [facility] ENDOSCOPY    Anesthesia Start:  9576 Anesthesia Stop:      Procedure:  COLONOSCOPY (N/A ) Diagnosis:       Gastrointestinal hemorrhage, unspecified

## 2018-02-23 NOTE — PROGRESS NOTES
ADMISSION NOTE    69year old male with h/o diverticulosis, CAD  presents with 2d days of painless rectal bleeding. Available medical records partially reviewed. Dictation to follow.     Joanne Reese M.D.  2/23/2018

## 2018-02-23 NOTE — H&P
[facility]    PATIENT'S NAME: Atilio Dorman   ATTENDING PHYSICIAN: Agnes Cerda MD   PATIENT ACCOUNT#:   [id number]    LOCATION:  [address] [place] [address] [address] RECORD #:   [id number]       YOB: 1950  ADMISSION DATE:       02/ chest pain, dizziness, palpitations, or syncope. Also, no shortness of breath. In the emergency room, his hemoglobin was 12.4.   The rectal exam revealed apparently mucus with a trace of blood, and he was subsequently admitted to the telemetry unit for fu artery disease. His father  in his [de-identified]. He had 3 myocardial infarctions. He has 7 siblings, 1 of his brothers has had several MIs and is 4 years younger than the patient. He has a sister with coronary artery disease.   He has 2 daughters, 1 with MS awake, alert, oriented x3 with no focal neurologic deficits. Gait was steady in tandem. BACK:  There was no costovertebral angle tenderness, no costovertebral angle pain. PSYCHIATRIC:  His mood and affect were pleasant and cooperative.      LABORATORY D reflux disease. Continue Protonix. 7.   Benign prostatic hypertrophy. Continue tamsulosin when taking p.o.  8.   DVT prophylaxis. We will not use any anticoagulation given the gastrointestinal bleeding. We will place on SCDs instead.   9.   The patient

## 2018-02-23 NOTE — CONSULTS
Gastroenterology consultation note    Reason for consultation:  Gastrointestinal bleeding      History of present illness: The patient is a 69year old male who is seen at his bedside this morning. The patient is an excellent historian.   He has a history abdominal surgery (with the exception of a nephrolithiasis)    Past Medical History:   Diagnosis Date   • Essential hypertension    • Heart attack ([facility] [address]    • Hypercholesterolemia    • Kidney stones          No Known Allergies        Current Facility-Admini Alcohol use Not on file    Drug use: Unknown     Other Topics Concern   None on file     Social History Narrative   None on file     The patient does not smoke nor drink alcohol. Family history significant for heart disease.       A comprehensive 10 p with a rapid bowel preparation this morning and colonoscopy, possible upper endoscopy this afternoon. The rationale for the procedure(s), their nature and risks including bleeding and perforation requiring surgery was discussed.   We discussed that most ep

## 2018-02-24 ENCOUNTER — SURGERY (OUTPATIENT)
Age: 68
End: 2018-02-24

## 2018-02-24 ENCOUNTER — ANESTHESIA (OUTPATIENT)
Dept: ENDOSCOPY | Facility: HOSPITAL | Age: 68
DRG: 378 | End: 2018-02-24
Payer: MEDICARE

## 2018-02-24 ENCOUNTER — ANESTHESIA EVENT (OUTPATIENT)
Dept: ENDOSCOPY | Facility: HOSPITAL | Age: 68
DRG: 378 | End: 2018-02-24
Payer: MEDICARE

## 2018-02-24 LAB
ANION GAP SERPL CALC-SCNC: 9 MMOL/L (ref 0–18)
BASOPHILS # BLD: 0 K/UL (ref 0–0.2)
BASOPHILS NFR BLD: 1 %
BUN SERPL-MCNC: 9 MG/DL (ref 8–20)
BUN/CREAT SERPL: 9.6 (ref 10–20)
CALCIUM SERPL-MCNC: 8.6 MG/DL (ref 8.5–10.5)
CHLORIDE SERPL-SCNC: 107 MMOL/L (ref 95–110)
CO2 SERPL-SCNC: 24 MMOL/L (ref 22–32)
CREAT SERPL-MCNC: 0.94 MG/DL (ref 0.5–1.5)
EOSINOPHIL # BLD: 0.2 K/UL (ref 0–0.7)
EOSINOPHIL NFR BLD: 4 %
ERYTHROCYTE [DISTWIDTH] IN BLOOD BY AUTOMATED COUNT: 14.8 % (ref 11–15)
GLUCOSE SERPL-MCNC: 82 MG/DL (ref 70–99)
HCT VFR BLD AUTO: 35.8 % (ref 41–52)
HCT VFR BLD AUTO: 36.2 % (ref 41–52)
HGB BLD-MCNC: 12.1 G/DL (ref 13.5–17.5)
HGB BLD-MCNC: 12.1 G/DL (ref 13.5–17.5)
LYMPHOCYTES # BLD: 1.5 K/UL (ref 1–4)
LYMPHOCYTES NFR BLD: 27 %
MCH RBC QN AUTO: 27.6 PG (ref 27–32)
MCHC RBC AUTO-ENTMCNC: 33.8 G/DL (ref 32–37)
MCV RBC AUTO: 81.9 FL (ref 80–100)
MONOCYTES # BLD: 0.5 K/UL (ref 0–1)
MONOCYTES NFR BLD: 10 %
NEUTROPHILS # BLD AUTO: 3.2 K/UL (ref 1.8–7.7)
NEUTROPHILS NFR BLD: 59 %
OSMOLALITY UR CALC.SUM OF ELEC: 288 MOSM/KG (ref 275–295)
PLATELET # BLD AUTO: 206 K/UL (ref 140–400)
PMV BLD AUTO: 6.7 FL (ref 7.4–10.3)
POTASSIUM SERPL-SCNC: 3.5 MMOL/L (ref 3.3–5.1)
POTASSIUM SERPL-SCNC: 3.5 MMOL/L (ref 3.3–5.1)
RBC # BLD AUTO: 4.37 M/UL (ref 4.5–5.9)
SODIUM SERPL-SCNC: 140 MMOL/L (ref 136–144)
WBC # BLD AUTO: 5.4 K/UL (ref 4–11)

## 2018-02-24 PROCEDURE — 45378 DIAGNOSTIC COLONOSCOPY: CPT | Performed by: INTERNAL MEDICINE

## 2018-02-24 PROCEDURE — 0DJD8ZZ INSPECTION OF LOWER INTESTINAL TRACT, VIA NATURAL OR ARTIFICIAL OPENING ENDOSCOPIC: ICD-10-PCS | Performed by: INTERNAL MEDICINE

## 2018-02-24 PROCEDURE — 99232 SBSQ HOSP IP/OBS MODERATE 35: CPT | Performed by: HOSPITALIST

## 2018-02-24 RX ORDER — LIDOCAINE HYDROCHLORIDE 10 MG/ML
INJECTION, SOLUTION EPIDURAL; INFILTRATION; INTRACAUDAL; PERINEURAL AS NEEDED
Status: DISCONTINUED | OUTPATIENT
Start: 2018-02-24 | End: 2018-02-24 | Stop reason: SURG

## 2018-02-24 RX ADMIN — SODIUM CHLORIDE: 9 INJECTION, SOLUTION INTRAVENOUS at 13:13:00

## 2018-02-24 RX ADMIN — SODIUM CHLORIDE: 9 INJECTION, SOLUTION INTRAVENOUS at 13:45:00

## 2018-02-24 RX ADMIN — LIDOCAINE HYDROCHLORIDE 25 MG: 10 INJECTION, SOLUTION EPIDURAL; INFILTRATION; INTRACAUDAL; PERINEURAL at 13:13:00

## 2018-02-24 RX ADMIN — SODIUM CHLORIDE: 9 INJECTION, SOLUTION INTRAVENOUS at 13:20:00

## 2018-02-24 NOTE — ANESTHESIA PREPROCEDURE EVALUATION
Anesthesia PreOp Note    HPI:     Shahnaz Emerson is a 69year old male who presents for preoperative consultation requested by: Radha Rodriguez MD    Date of Surgery: 2/23/2018 - 2/24/2018    Procedure(s):  COLONOSCOPY  Indication: GI bleed      Histor (three) times daily as needed. Disp:  Rfl:  More than a month at Unknown time   DiphenhydrAMINE HCl 25 MG Oral Cap Take 25 mg by mouth every 6 (six) hours as needed for Itching.  Disp:  Rfl:  Taking   nitroGLYCERIN 0.4 MG Sublingual SL Tab Place 0.4 mg unde Results  Component Value Date   INR 1.1 02/23/2018       Vital Signs: Body mass index is 32.98 kg/m². height is 1.854 m (6' 1\") and weight is 113.4 kg (250 lb). His oral temperature is 97.5 °F (36.4 °C) (abnormal).  His blood pressure is 161/104 (abnorm

## 2018-02-24 NOTE — OPERATIVE REPORT
[facility] Endoscopy Report      Date of Procedure:  02/24/18      Preoperative Diagnosis:  Gastrointestinal bleeding      Postoperative Diagnosis:  1. Recurrent lower gastrointestinal bleeding, left-sided, likely diverticular  2.   Nearly to the sigmoid, quite extensive in the latter. Extensive irrigation and suctioning were performed. No active bleeding was identified despite multiple passes through the left colon.   There were no colonic polyps, mass lesions, vascular anomalies or signs

## 2018-02-24 NOTE — ANESTHESIA POSTPROCEDURE EVALUATION
Patient: Leslie Reynoso    Procedure Summary     Date:  02/24/18 Room / Location:  [address] [place] [address] ENDOSCOPY 02 / [address] [place] [address] ENDOSCOPY    Anesthesia Start:  7189 Anesthesia Stop:  9018    Procedure:  COLONOSCOPY (N/A ) Diagnosis:  (Diverticulosis old blood)    Surgeon:  Berny Banks

## 2018-02-24 NOTE — TELEPHONE ENCOUNTER
From: Yemi Valenzuela  To: Kevin Villar DO  Sent: 2/22/2018 2:01 PM CST  Subject: Non-Urgent Medical Question    Perhaps I was a bit hasty with desire to be rid of whatever's going on with me there is still a presence of blood please provide me wi

## 2018-02-24 NOTE — TELEPHONE ENCOUNTER
From: Ishaan Trotter  To: Gracia Rowland DO  Sent: 2/22/2018 8:53 AM CST  Subject: Non-Urgent Medical Question    Last night about 8:30 PM I experienced some blood in my stool. This morning having a bowel movement that was blood in my stool.   The

## 2018-02-24 NOTE — TELEPHONE ENCOUNTER
From: Lenore Lockhart  To: Yehuda Castillo DO  Sent: 2/22/2018 9:34 AM CST  Subject: Non-Urgent Medical Question    9:30 AM I just had another bowel movement and I did not see any blood in the stool.

## 2018-02-24 NOTE — OR NURSING
Patient incontinent of moderate amount of blood tinged liquid from rectum with a few small clots.  Patient cleaned and floor nurse updated

## 2018-02-24 NOTE — TELEPHONE ENCOUNTER
From: Emil Bell  To: Asuncion Pallas, DO  Sent: 2/22/2018 2:02 PM CST  Subject: Non-Urgent Medical Question    I can be reached at [phone]

## 2018-02-24 NOTE — PROGRESS NOTES
[place] [facility]  Progress Note     Neomia Showers  : 1950    Status: Observation  Day #: 0    Attending: Berto Gilbert MD  PCP: Reji Beauchamp,       Assessment and Plan     Lower GI bleed  - GI following appreciated.    - Had C extremities. Extremities: No edema.       Intake/Output Summary (Last 24 hours) at 02/24/18 1124  Last data filed at 02/24/18 0752   Gross per 24 hour   Intake             1576 ml   Output              200 ml   Net             1376 ml       Patient Weight

## 2018-02-24 NOTE — H&P
History & Physical Examination    Patient Name: Avila Grimm  MRN: [id number]  CSN: [id number]  YOB: 1950    Diagnosis: GI bleeding    Prescriptions Prior to Admission:  furosemide 40 MG Oral Tab Take 40 mg by mouth nightly.  Disp:  Rfl:  2 (PROTONIX) 40 mg in Sodium Chloride 0.9 % 10 mL IV push 40 mg Intravenous Q24H   0.9%  NaCl infusion  Intravenous Continuous       Allergies: No Known Allergies    Past Medical History:   Diagnosis Date   • Essential hypertension    • Heart attack ([facility] [address]

## 2018-02-25 ENCOUNTER — APPOINTMENT (OUTPATIENT)
Dept: NUCLEAR MEDICINE | Facility: HOSPITAL | Age: 68
DRG: 378 | End: 2018-02-25
Attending: INTERNAL MEDICINE
Payer: MEDICARE

## 2018-02-25 LAB
HCT VFR BLD AUTO: 34.1 % (ref 41–52)
HGB BLD-MCNC: 11.4 G/DL (ref 13.5–17.5)
POTASSIUM SERPL-SCNC: 3.7 MMOL/L (ref 3.3–5.1)

## 2018-02-25 PROCEDURE — 99233 SBSQ HOSP IP/OBS HIGH 50: CPT | Performed by: HOSPITALIST

## 2018-02-25 PROCEDURE — 99232 SBSQ HOSP IP/OBS MODERATE 35: CPT | Performed by: INTERNAL MEDICINE

## 2018-02-25 PROCEDURE — 78278 ACUTE GI BLOOD LOSS IMAGING: CPT | Performed by: INTERNAL MEDICINE

## 2018-02-25 NOTE — PROGRESS NOTES
[place] [facility]  Progress Note     Zoë Flair  : 1950    Status: Observation  Day #: 0    Attending: Balta Lamb MD  PCP: Stephan Lomax DO      Assessment and Plan     Lower GI bleed  - GI following appreciated.    - Had C Musculoskeletal: Moves all extremities. Extremities: No edema.       Intake/Output Summary (Last 24 hours) at 02/25/18 1345  Last data filed at 02/25/18 0755   Gross per 24 hour   Intake             2350 ml   Output              800 ml   Net

## 2018-02-25 NOTE — PROGRESS NOTES
[facility]  Progress Note    Cezar Garcia Patient Status:  Observation    1950 MRN [id number]   Location [facility]5W Attending Jaelyn Dominguez MD   Hosp Day # 0 PCP Rodo Cho, DO     Subjective:  Cezar Garcia is a(n)

## 2018-02-26 ENCOUNTER — MED REC SCAN ONLY (OUTPATIENT)
Dept: GASTROENTEROLOGY | Facility: CLINIC | Age: 68
End: 2018-02-26

## 2018-02-26 LAB
ANION GAP SERPL CALC-SCNC: 5 MMOL/L (ref 0–18)
BASOPHILS # BLD: 0 K/UL (ref 0–0.2)
BASOPHILS NFR BLD: 1 %
BUN SERPL-MCNC: 7 MG/DL (ref 8–20)
BUN/CREAT SERPL: 7.5 (ref 10–20)
CALCIUM SERPL-MCNC: 8.4 MG/DL (ref 8.5–10.5)
CHLORIDE SERPL-SCNC: 110 MMOL/L (ref 95–110)
CO2 SERPL-SCNC: 24 MMOL/L (ref 22–32)
CREAT SERPL-MCNC: 0.93 MG/DL (ref 0.5–1.5)
EOSINOPHIL # BLD: 0.2 K/UL (ref 0–0.7)
EOSINOPHIL NFR BLD: 4 %
ERYTHROCYTE [DISTWIDTH] IN BLOOD BY AUTOMATED COUNT: 14.6 % (ref 11–15)
GLUCOSE SERPL-MCNC: 84 MG/DL (ref 70–99)
HCT VFR BLD AUTO: 33.8 % (ref 41–52)
HGB BLD-MCNC: 11.5 G/DL (ref 13.5–17.5)
LYMPHOCYTES # BLD: 1.3 K/UL (ref 1–4)
LYMPHOCYTES NFR BLD: 26 %
MAGNESIUM SERPL-MCNC: 1.8 MG/DL (ref 1.8–2.5)
MCH RBC QN AUTO: 27.5 PG (ref 27–32)
MCHC RBC AUTO-ENTMCNC: 34 G/DL (ref 32–37)
MCV RBC AUTO: 80.9 FL (ref 80–100)
MONOCYTES # BLD: 0.5 K/UL (ref 0–1)
MONOCYTES NFR BLD: 11 %
NEUTROPHILS # BLD AUTO: 3 K/UL (ref 1.8–7.7)
NEUTROPHILS NFR BLD: 59 %
OSMOLALITY UR CALC.SUM OF ELEC: 285 MOSM/KG (ref 275–295)
PLATELET # BLD AUTO: 196 K/UL (ref 140–400)
PMV BLD AUTO: 6.5 FL (ref 7.4–10.3)
POTASSIUM SERPL-SCNC: 3.5 MMOL/L (ref 3.3–5.1)
POTASSIUM SERPL-SCNC: 3.5 MMOL/L (ref 3.3–5.1)
RBC # BLD AUTO: 4.18 M/UL (ref 4.5–5.9)
SODIUM SERPL-SCNC: 139 MMOL/L (ref 136–144)
WBC # BLD AUTO: 5.1 K/UL (ref 4–11)

## 2018-02-26 PROCEDURE — 99232 SBSQ HOSP IP/OBS MODERATE 35: CPT | Performed by: INTERNAL MEDICINE

## 2018-02-26 PROCEDURE — 99233 SBSQ HOSP IP/OBS HIGH 50: CPT | Performed by: HOSPITALIST

## 2018-02-26 RX ORDER — MAGNESIUM OXIDE 400 MG (241.3 MG MAGNESIUM) TABLET
400 TABLET ONCE
Status: COMPLETED | OUTPATIENT
Start: 2018-02-26 | End: 2018-02-26

## 2018-02-26 RX ORDER — SODIUM CHLORIDE, SODIUM LACTATE, POTASSIUM CHLORIDE, CALCIUM CHLORIDE 600; 310; 30; 20 MG/100ML; MG/100ML; MG/100ML; MG/100ML
INJECTION, SOLUTION INTRAVENOUS CONTINUOUS
Status: DISCONTINUED | OUTPATIENT
Start: 2018-02-26 | End: 2018-02-26

## 2018-02-26 RX ORDER — POTASSIUM CHLORIDE 20 MEQ/1
40 TABLET, EXTENDED RELEASE ORAL EVERY 4 HOURS
Status: COMPLETED | OUTPATIENT
Start: 2018-02-26 | End: 2018-02-26

## 2018-02-26 RX ORDER — NALOXONE HYDROCHLORIDE 0.4 MG/ML
80 INJECTION, SOLUTION INTRAMUSCULAR; INTRAVENOUS; SUBCUTANEOUS AS NEEDED
Status: DISCONTINUED | OUTPATIENT
Start: 2018-02-26 | End: 2018-02-26

## 2018-02-26 NOTE — PAYOR COMM NOTE
OBSV  STATUS    Admit Orders     Start     Ordered    02/23/18 0330  Place in observation Once  Once     Ordering Provider:  Delilah Vieyra MD   Question Answer Comment   Diagnosis Rectal bleeding    Bed request comments remote tele        02/23/18 passage x1. No CP/SOB/palpitations. No dizziness/lightheadedness. No other anticoagulant/antiplatelet use. No straining/constipation. [AA.1] History of similar in 2014, seen at [facility] with unremarkable endoscopy per patient though lost six uni and visual disturbance. Respiratory: Negative for cough and shortness of breath. Cardiovascular: Negative for chest pain and palpitations. Gastrointestinal: Negative for vomiting and abdominal pain. (+) rectal bleeding.     Positive for stated compla CBC W/ DIFFERENTIAL[[id number]]          Abnormal            Final result                 Please view results for these tests on the individual orders. TYPE AND SCREEN    Narrative:      The following orders were created for panel order TYPE A Maria Isabel Hardwick MD on 2/23/2018  2:19 AM   Attribution Key     AA. 1 Cali Whitney MD on 2/23/2018 12:33 AM   AA. 2 - Jose Carlos Finch MD on 2/23/2018 12:37 AM   AA. 3 - Jose Carlos Finch MD on 2/23/2018  2:17 AM   AA. 4 - Jose Carlos Finch MD on 2/23/2018  2:19 AM (three) times daily as needed. Disp:  Rfl:  More than a month at Unknown time   DiphenhydrAMINE HCl 25 MG Oral Cap Take 25 mg by mouth every 6 (six) hours as needed for Itching.  Disp:  Rfl:  Taking   nitroGLYCERIN 0.4 MG Sublingual SL Tab Place 0.4 mg unde mg     Date Action Dose Route User    2/26/2018 0747 Given 400 mg Oral Ivis Fernando RN      Pantoprazole Sodium (PROTONIX) 40 mg in Sodium Chloride 0.9 % 10 mL IV push     Date Action Dose Route User    2/25/2018 4824 Given 40 mg Hubert Aguayo

## 2018-02-26 NOTE — PLAN OF CARE
Problem: Patient/Family Goals  Goal: Patient/Family Long Term Goal  Patient's Long Term Goal: to return home    Interventions:  Monitor stools  IV fluids  Monitor po intake and diet tolerance  Monitor labs  - See additional Care Plan goals for specific int

## 2018-02-26 NOTE — PROGRESS NOTES
[place] [facility]  Progress Note     Corinna Jones  : 1950    Status: Observation  Day #: 0    Attending: Rola Harris MD  PCP: Crow Miller,       Assessment and Plan     Lower GI bleed  - GI following appreciated.    - Had C 2070 ml   Output              775 ml   Net             1295 ml       Patient Weight for the past 72 hrs:   Weight   02/23/18 0004 250 lb (113.4 kg)         Body mass index is 32.98 kg/m².     Recent Labs   Lab  02/24/18   0529  02/24/18   1740  0

## 2018-02-27 LAB
ANION GAP SERPL CALC-SCNC: 6 MMOL/L (ref 0–18)
BASOPHILS # BLD: 0 K/UL (ref 0–0.2)
BASOPHILS NFR BLD: 1 %
BUN SERPL-MCNC: 6 MG/DL (ref 8–20)
BUN/CREAT SERPL: 6.7 (ref 10–20)
CALCIUM SERPL-MCNC: 8.4 MG/DL (ref 8.5–10.5)
CHLORIDE SERPL-SCNC: 109 MMOL/L (ref 95–110)
CO2 SERPL-SCNC: 24 MMOL/L (ref 22–32)
CREAT SERPL-MCNC: 0.9 MG/DL (ref 0.5–1.5)
EOSINOPHIL # BLD: 0.3 K/UL (ref 0–0.7)
EOSINOPHIL NFR BLD: 6 %
ERYTHROCYTE [DISTWIDTH] IN BLOOD BY AUTOMATED COUNT: 14.6 % (ref 11–15)
GLUCOSE SERPL-MCNC: 99 MG/DL (ref 70–99)
HCT VFR BLD AUTO: 34.1 % (ref 41–52)
HGB BLD-MCNC: 11.6 G/DL (ref 13.5–17.5)
LYMPHOCYTES # BLD: 1.4 K/UL (ref 1–4)
LYMPHOCYTES NFR BLD: 29 %
MAGNESIUM SERPL-MCNC: 1.8 MG/DL (ref 1.8–2.5)
MCH RBC QN AUTO: 27.5 PG (ref 27–32)
MCHC RBC AUTO-ENTMCNC: 34 G/DL (ref 32–37)
MCV RBC AUTO: 81 FL (ref 80–100)
MONOCYTES # BLD: 0.6 K/UL (ref 0–1)
MONOCYTES NFR BLD: 12 %
NEUTROPHILS # BLD AUTO: 2.5 K/UL (ref 1.8–7.7)
NEUTROPHILS NFR BLD: 52 %
OSMOLALITY UR CALC.SUM OF ELEC: 286 MOSM/KG (ref 275–295)
PLATELET # BLD AUTO: 209 K/UL (ref 140–400)
PMV BLD AUTO: 6.7 FL (ref 7.4–10.3)
POTASSIUM SERPL-SCNC: 3.6 MMOL/L (ref 3.3–5.1)
POTASSIUM SERPL-SCNC: 4.4 MMOL/L (ref 3.3–5.1)
RBC # BLD AUTO: 4.21 M/UL (ref 4.5–5.9)
SODIUM SERPL-SCNC: 139 MMOL/L (ref 136–144)
WBC # BLD AUTO: 4.7 K/UL (ref 4–11)

## 2018-02-27 PROCEDURE — 99232 SBSQ HOSP IP/OBS MODERATE 35: CPT | Performed by: INTERNAL MEDICINE

## 2018-02-27 PROCEDURE — 99233 SBSQ HOSP IP/OBS HIGH 50: CPT | Performed by: HOSPITALIST

## 2018-02-27 RX ORDER — POTASSIUM CHLORIDE 20 MEQ/1
40 TABLET, EXTENDED RELEASE ORAL EVERY 4 HOURS
Status: COMPLETED | OUTPATIENT
Start: 2018-02-27 | End: 2018-02-27

## 2018-02-27 RX ORDER — MAGNESIUM OXIDE 400 MG (241.3 MG MAGNESIUM) TABLET
400 TABLET ONCE
Status: COMPLETED | OUTPATIENT
Start: 2018-02-27 | End: 2018-02-27

## 2018-02-27 NOTE — PROGRESS NOTES
[facility] [place] [facility]  Progress Note    Angi Hall Patient Status:  Observation    1950 MRN [id number]   Location [facility]5W Attending Maylin Johnson MD   Hosp Day # 1 PCP Aminata Arias DO     Subjective:  Angi Hall is a(n)

## 2018-02-27 NOTE — PROGRESS NOTES
[facility]OSPITAL  Progress Note    Julia Brenner Patient Status:  Observation    1950 MRN [id number]   Location [facility]5W Attending Ishaan Altamirano MD   Hosp Day # 0 PCP Porter James DO     Subjective:  Julia Brenner is a(n)

## 2018-02-27 NOTE — PROGRESS NOTES
[place] [facility]  Progress Note     Zoë Flair  : 1950    Status: Observation  Day #: 1    Attending: Balta Lamb MD  PCP: Stephan Lomax DO      Assessment and Plan     Lower GI bleed  - GI following appreciated.    - Had C 400 ml   Net              980 ml       Patient Weight for the past 72 hrs:   Weight   02/23/18 0004 250 lb (113.4 kg)         Body mass index is 32.98 kg/m².     Recent Labs   Lab  02/25/18   0506  02/26/18   0458  02/27/18   0436   WBC   --   5.1  4.7

## 2018-02-28 ENCOUNTER — PATIENT MESSAGE (OUTPATIENT)
Dept: FAMILY MEDICINE CLINIC | Facility: CLINIC | Age: 68
End: 2018-02-28

## 2018-02-28 VITALS
BODY MASS INDEX: 33.13 KG/M2 | WEIGHT: 250 LBS | SYSTOLIC BLOOD PRESSURE: 138 MMHG | HEART RATE: 58 BPM | DIASTOLIC BLOOD PRESSURE: 77 MMHG | OXYGEN SATURATION: 97 % | RESPIRATION RATE: 16 BRPM | TEMPERATURE: 97 F | HEIGHT: 73 IN

## 2018-02-28 LAB — MAGNESIUM SERPL-MCNC: 1.9 MG/DL (ref 1.8–2.5)

## 2018-02-28 PROCEDURE — 99239 HOSP IP/OBS DSCHRG MGMT >30: CPT | Performed by: HOSPITALIST

## 2018-02-28 NOTE — PAYOR COMM NOTE
Admit Orders     Start     Ordered    02/26/18 1322  Admit to inpatient Once  Once     Ordering Provider:  Cameron Johnson MD   Question Answer Comment   Admitting Physician [place]     Diagnosis Rectal bleeding    Level of Care Acute        02/26/18 Hospital  Discharge Summary     Chet Max  : 1950    Status: Inpatient  Day #: 2    Attending: Pinky French MD  PCP: Army Fernanda DO     Date of Admission: 2018  Date of Discharge: 2018     Hospital Discharge Diagnoses[AP.1] evaluation and treatment. He denied any chest pain, dizziness, palpitations, or syncope. Also, no shortness of breath. In the emergency room, his hemoglobin was 12.4.   The rectal exam revealed apparently mucus with a trace of blood, and he was subsequen normocephalic. Eyes:  Extraocular muscles were intact. The patient was anicteric. Pupils were equal.  Ears: There were no lesions. Nose:  No lesions were noted. NECK:  Supple. There was no JVD. CHEST:  Symmetrical movement on inspiration.   HEART morning before breakfast.   Quantity:  90 tablet  Refills:  1     Potassium Chloride ER 10 MEQ Tbcr  Commonly known as:  K-DUR      Take 10 mEq by mouth daily.    Refills:  0     simvastatin 80 MG Tabs  Commonly known as:  ZOCOR      Take 1 tablet (80 mg to ED Provider Notes signed by Ale Schroeder MD at 2/23/2018  2:19 AM     Author:  Ale Schroeder MD Service:  (none) Author Type:  Physician    Filed:  2/23/2018  2:19 AM Date of Service:  2/23/2018 12:33 AM Status:  Signed    :  Aminata Cronin mouth 3 (three) times daily as needed. DiphenhydrAMINE HCl 25 MG Oral Cap,  Take 25 mg by mouth every 6 (six) hours as needed for Itching.    nitroGLYCERIN 0.4 MG Sublingual SL Tab,  Place 0.4 mg under the tongue every 5 (five) minutes as needed for Chest components within normal limits   CBC W/ DIFFERENTIAL - Abnormal; Notable for the following:     RBC 4.46 (*)     HGB 12.4 (*)     HCT 36.5 (*)     MPV 7.2 (*)     All other components within normal limits   PROTHROMBIN TIME (PT) - Normal   PTT, ACTIVATED on ASA therapy. History of similar four years ago requiring multiple pRBC transfusion at [facility] - will admit for ongoing management.  PPI initiated; PCP Dr. Adebayo Kraus, graciously admitted to [address] [place] [address] Dr. Gale Fleischer and GI Dr. Bhavana Shelley consulted with total) by mouth daily.  (Patient taking differently: Take 0.4 mg by mouth nightly.  ) Disp: 90 capsule Rfl: 1 2/22/2018 at 2100   Pantoprazole Sodium 40 MG Oral Tab EC Take 1 tablet (40 mg total) by mouth every morning before breakfast. Disp: 90 tablet Rfl: EXTREMITIES [x ] [x ]    OTHER        [ x ] I have discussed the risks and benefits and alternatives with the patient/family. They understand and agree to proceed with plan of care.   [ x ] I have reviewed the History and Physical done within the last 30

## 2018-02-28 NOTE — DISCHARGE SUMMARY
[place] [facility]  Discharge Summary     Corinna Jones  : 1950    Status: Inpatient  Day #: 2    Attending: Wade Andrew MD  PCP: Crow Miller DO     Date of Admission: 2018  Date of Discharge: 2018     Hospital Discharge evaluation and treatment. He denied any chest pain, dizziness, palpitations, or syncope. Also, no shortness of breath. In the emergency room, his hemoglobin was 12.4.   The rectal exam revealed apparently mucus with a trace of blood, and he was subsequen Extraocular muscles were intact. The patient was anicteric. Pupils were equal.  Ears: There were no lesions. Nose:  No lesions were noted. NECK:  Supple. There was no JVD. CHEST:  Symmetrical movement on inspiration. HEART:  S1 and S2 heard.   RR Quantity:  90 tablet  Refills:  1     Potassium Chloride ER 10 MEQ Tbcr  Commonly known as:  K-DUR      Take 10 mEq by mouth daily. Refills:  0     simvastatin 80 MG Tabs  Commonly known as:  ZOCOR      Take 1 tablet (80 mg total) by mouth nightly.    Dottie Colbert

## 2018-03-01 ENCOUNTER — TELEPHONE (OUTPATIENT)
Dept: GASTROENTEROLOGY | Facility: CLINIC | Age: 68
End: 2018-03-01

## 2018-03-01 ENCOUNTER — TELEPHONE (OUTPATIENT)
Dept: INTERNAL MEDICINE UNIT | Facility: HOSPITAL | Age: 68
End: 2018-03-01

## 2018-03-01 ENCOUNTER — PATIENT OUTREACH (OUTPATIENT)
Dept: CASE MANAGEMENT | Age: 68
End: 2018-03-01

## 2018-03-01 ENCOUNTER — TELEPHONE (OUTPATIENT)
Dept: FAMILY MEDICINE CLINIC | Facility: CLINIC | Age: 68
End: 2018-03-01

## 2018-03-01 RX ORDER — NITROGLYCERIN 0.4 MG/1
0.4 TABLET SUBLINGUAL EVERY 5 MIN PRN
Qty: 100 TABLET | Refills: 0 | Status: SHIPPED | OUTPATIENT
Start: 2018-03-01 | End: 2020-01-24

## 2018-03-01 NOTE — TELEPHONE ENCOUNTER
Pt requesting hospital f/u appt with Dr Collette Pradhan for any day next week, any time can accommodate    Only SDS available    Please reply to pool: [facility]

## 2018-03-01 NOTE — TELEPHONE ENCOUNTER
[facility] to [facility]. Pt discharged from [facility] for lower GI bleed. Dr Ottoniel Farmer did colonoscopy 2/24. Pt states he is doing well. Denies pain or further GI bleeding. Is on low residue diet.  Pt accepted appt with you Mon March 5 at 11:30am with 11:15 am arrival and give

## 2018-03-01 NOTE — TELEPHONE ENCOUNTER
Pt. states that he saw PJL in the hosp and had a proc done, and is supposed to see him in 2 weeks for f/up appt. , which is sooner then 1st avail., An appt was offered with AP[facility], but the pt. Prefers to see PJL.

## 2018-03-01 NOTE — TELEPHONE ENCOUNTER
Please advise on refill request.     Recent Outpatient Visits            3 weeks ago     [facility], [facility], [address]    Nurse Only    4 weeks ago Essential hypertension    [facility], [facility], [address], [place]ina [place], [place]    Office

## 2018-03-01 NOTE — PROGRESS NOTES
Initial Post Discharge Follow Up   Discharge Date: 2/28/18  Contact Date: 3/1/2018    Consent Verification:  Assessment Completed With: Patient  HIPAA Verified? Yes    Discharge Dx:      Lower GI bleed - source not found likely diverticular  Acute blood mouth 3 (three) times daily as needed. Disp:  Rfl:    DiphenhydrAMINE HCl 25 MG Oral Cap Take 25 mg by mouth every 6 (six) hours as needed for Itching.  Disp:  Rfl:    nitroGLYCERIN 0.4 MG Sublingual SL Tab Place 0.4 mg under the tongue every 5 (five) minut Medications reviewed w pt. Pt states to need a refill on nitroglicerin tabs. Message routed to triage team for this. Pt taking all other medications as instructed w the exception of meclizine which he reports to have only needed one dose ~1 year ago.  Pt s

## 2018-03-01 NOTE — PROGRESS NOTES
[facility] Follow-up Visit    Skyla episodes of rectal bleeding. He has been following the dietary modifications as instructed by Dr. Kayli Addison inpatient as well as following up with a dietitian who has been of further assistance in understanding his dietary parameters.   He feels that he is adj Gastroenteritis 2015   • Gastrointestinal bleeding 2014   • Heart attack [facility])    • Heart disease 2004   • High blood pressure    • High cholesterol    • History of blood transfusion 2014   • Hypercholesterolemia    • Kidney stones       Past Surgical Hist breakfast. Disp: 90 tablet Rfl: 1   Acetaminophen-Codeine 300-30 MG Oral Tab Take 1 tablet by mouth every 4 (four) hours as needed for Pain. Disp: 80 tablet Rfl: 0   aspirin 81 MG Oral Tab Take 81 mg by mouth nightly.    Disp:  Rfl:    Meclizine HCl 25 MG O and discussed with patient today. See HPI and A&P for further details.      .  ASSESSMENT/PLAN:   Santiago Treviño is a 69year old year-old male, patient of Dr. Mares Began with history of GERD, hypertension, MI, CAD, cardiac defibrillator/ICD placement, hyp surgical intervention. I also specifically mentioned the miss rate of colonoscopy of 5-10% in the best of all circumstances. All questions were answered to the patient’s satisfaction.  The patient signed informed consent and elected to proceed with colonosc

## 2018-03-01 NOTE — TELEPHONE ENCOUNTER
Pt states he has been out of PRN Nitroglycerin tabs for a long time. Requesting refill on this medication.

## 2018-03-01 NOTE — TELEPHONE ENCOUNTER
[facility] Hospital Follow Up Attempted to schedule a [facility] hospital follow up with PCP [address] [place] [address] Thank you

## 2018-03-02 NOTE — TELEPHONE ENCOUNTER
From: Williams Gibson  To: Melissa Witt DO  Sent: 2/28/2018 8:26 PM CST  Subject: Other    Discharge instructions:     Follow up with Dr. Nancy Jamison DO in one week    Wednesday, March 7, 2018 have CBC, platelet, no differential done at your

## 2018-03-05 ENCOUNTER — TELEPHONE (OUTPATIENT)
Dept: GASTROENTEROLOGY | Facility: CLINIC | Age: 68
End: 2018-03-05

## 2018-03-05 ENCOUNTER — OFFICE VISIT (OUTPATIENT)
Dept: FAMILY MEDICINE CLINIC | Facility: CLINIC | Age: 68
End: 2018-03-05

## 2018-03-05 ENCOUNTER — LAB ENCOUNTER (OUTPATIENT)
Dept: LAB | Age: 68
End: 2018-03-05
Attending: FAMILY MEDICINE
Payer: MEDICARE

## 2018-03-05 ENCOUNTER — OFFICE VISIT (OUTPATIENT)
Dept: GASTROENTEROLOGY | Facility: CLINIC | Age: 68
End: 2018-03-05

## 2018-03-05 VITALS
HEART RATE: 65 BPM | HEIGHT: 74 IN | SYSTOLIC BLOOD PRESSURE: 100 MMHG | WEIGHT: 243.63 LBS | DIASTOLIC BLOOD PRESSURE: 60 MMHG | BODY MASS INDEX: 31.27 KG/M2

## 2018-03-05 VITALS — BODY MASS INDEX: 31.18 KG/M2 | RESPIRATION RATE: 16 BRPM | WEIGHT: 243 LBS | HEIGHT: 74 IN | TEMPERATURE: 96 F

## 2018-03-05 DIAGNOSIS — Z86.010 HISTORY OF COLON POLYPS: ICD-10-CM

## 2018-03-05 DIAGNOSIS — J44.9 ASTHMA WITH COPD (CHRONIC OBSTRUCTIVE PULMONARY DISEASE) (HCC): ICD-10-CM

## 2018-03-05 DIAGNOSIS — Z87.19 HISTORY OF GI BLEED: Primary | ICD-10-CM

## 2018-03-05 DIAGNOSIS — Z09 HOSPITAL DISCHARGE FOLLOW-UP: ICD-10-CM

## 2018-03-05 DIAGNOSIS — Z86.010 HISTORY OF COLONIC POLYPS: ICD-10-CM

## 2018-03-05 DIAGNOSIS — I10 ESSENTIAL HYPERTENSION: Primary | ICD-10-CM

## 2018-03-05 DIAGNOSIS — K92.2 GASTROINTESTINAL HEMORRHAGE, UNSPECIFIED GASTROINTESTINAL HEMORRHAGE TYPE: ICD-10-CM

## 2018-03-05 DIAGNOSIS — Z87.19 HISTORY OF LOWER GI BLEEDING: Primary | ICD-10-CM

## 2018-03-05 DIAGNOSIS — Z23 NEED FOR 23-POLYVALENT PNEUMOCOCCAL POLYSACCHARIDE VACCINE: ICD-10-CM

## 2018-03-05 PROBLEM — J44.89 ASTHMA WITH COPD (CHRONIC OBSTRUCTIVE PULMONARY DISEASE): Chronic | Status: ACTIVE | Noted: 2018-03-05

## 2018-03-05 LAB
BASOPHILS # BLD: 0 K/UL (ref 0–0.2)
BASOPHILS NFR BLD: 1 %
EOSINOPHIL # BLD: 0.3 K/UL (ref 0–0.7)
EOSINOPHIL NFR BLD: 6 %
ERYTHROCYTE [DISTWIDTH] IN BLOOD BY AUTOMATED COUNT: 14.7 % (ref 11–15)
HCT VFR BLD AUTO: 37.6 % (ref 41–52)
HGB BLD-MCNC: 12.5 G/DL (ref 13.5–17.5)
LYMPHOCYTES # BLD: 1.5 K/UL (ref 1–4)
LYMPHOCYTES NFR BLD: 35 %
MCH RBC QN AUTO: 27.3 PG (ref 27–32)
MCHC RBC AUTO-ENTMCNC: 33.3 G/DL (ref 32–37)
MCV RBC AUTO: 81.9 FL (ref 80–100)
MONOCYTES # BLD: 0.7 K/UL (ref 0–1)
MONOCYTES NFR BLD: 16 %
NEUTROPHILS # BLD AUTO: 1.9 K/UL (ref 1.8–7.7)
NEUTROPHILS NFR BLD: 43 %
PLATELET # BLD AUTO: 226 K/UL (ref 140–400)
PMV BLD AUTO: 7.4 FL (ref 7.4–10.3)
RBC # BLD AUTO: 4.59 M/UL (ref 4.5–5.9)
WBC # BLD AUTO: 4.4 K/UL (ref 4–11)

## 2018-03-05 PROCEDURE — 36415 COLL VENOUS BLD VENIPUNCTURE: CPT

## 2018-03-05 PROCEDURE — 99495 TRANSJ CARE MGMT MOD F2F 14D: CPT | Performed by: FAMILY MEDICINE

## 2018-03-05 PROCEDURE — 99213 OFFICE O/P EST LOW 20 MIN: CPT | Performed by: NURSE PRACTITIONER

## 2018-03-05 PROCEDURE — 85025 COMPLETE CBC W/AUTO DIFF WBC: CPT

## 2018-03-05 PROCEDURE — 90732 PPSV23 VACC 2 YRS+ SUBQ/IM: CPT | Performed by: FAMILY MEDICINE

## 2018-03-05 PROCEDURE — 99212 OFFICE O/P EST SF 10 MIN: CPT | Performed by: NURSE PRACTITIONER

## 2018-03-05 PROCEDURE — 90471 IMMUNIZATION ADMIN: CPT | Performed by: FAMILY MEDICINE

## 2018-03-05 NOTE — PATIENT INSTRUCTIONS
1. Schedule colonoscopy with Dr. Sharonda collins/ SARAH BETH in at least 6 to 8 week    2.  bowel prep from pharmacy - split dose Colyte    3. Continue all medications for procedure     4. Read all bowel prep instructions carefully    5.  AVOID seeds, nuts, popcorn

## 2018-03-05 NOTE — PATIENT INSTRUCTIONS
Monitor blood pressures and record at home. Limit salt intake. Recommend weight loss via daily exercising and consistent healthy dietary changes. Follow GI diet that has been recommended. Keep GI follow up. CBC.

## 2018-03-05 NOTE — PROGRESS NOTES
HPI:    Sfoya Carey is a 69year old male here today for hospital follow up.    He was discharged from Inpatient hospital, [facility]  to Home   Admission Date: 2/23/18   Discharge Date: 2/28/18  Hospital Discharge Diagnoses (since 2/3/2018) total) by mouth nightly. Metoprolol Succinate  MG Oral Tablet 24 Hr Take 150 mg by mouth daily. Pt takes 1 1/2 tablet daily. losartan 100 MG Oral Tab Take 1 tablet (100 mg total) by mouth daily.    tamsulosin HCl 0.4 MG Oral Cap Take 1 capsule ( denies heartburn, denies diarrhea  MUSCULOSKELETAL: denies pain, normal range of motion of extremities  NEURO: denies headaches, denies dizziness, denies weakness  PSYCHE: denies depression or anxiety  HEMATOLOGIC: denies hx of anemia, or bruising, denies daily exercising and consistent healthy dietary changes. Follow GI diet that has been recommended. Keep GI follow up. CBC.           Orders Placed This Encounter      CBC W Differential W Platelet [E]      PNEUMOCOCCAL IMM, 23    Meds & Refills for this

## 2018-03-05 NOTE — TELEPHONE ENCOUNTER
I schedule this patient in the office today with Dr. Sascha Romano at [address] [place] [address] 1 but realized with his cardiac issues we can not schedule at the outpatient surgery center. LMTCB to reschedule procedure.  Please transfer to Amanda Weir in GI

## 2018-03-05 NOTE — TELEPHONE ENCOUNTER
Scheduled for:  Colonoscopy 327[address]  Provider Name: Dr. Ottoniel Farmer  Date:  4/26/18  Location:  [facility]  Sedation:  MAC  Time:  8768 (pt is aware to arrive at [address] [place] [address])   Prep:  Mruphy, mailed new instructions on 3/5/18 with codes  Meds/Allergies Reconciled?:  Uriel Frederick

## 2018-03-07 ENCOUNTER — TELEPHONE (OUTPATIENT)
Dept: FAMILY MEDICINE CLINIC | Facility: CLINIC | Age: 68
End: 2018-03-07

## 2018-03-07 NOTE — TELEPHONE ENCOUNTER
Left message to callback to schedule 2018 Medicare AHA appointment with patient spouse Naomi Burris.

## 2018-03-21 ENCOUNTER — PATIENT MESSAGE (OUTPATIENT)
Dept: FAMILY MEDICINE CLINIC | Facility: CLINIC | Age: 68
End: 2018-03-21

## 2018-03-21 DIAGNOSIS — K21.9 GASTROESOPHAGEAL REFLUX DISEASE, ESOPHAGITIS PRESENCE NOT SPECIFIED: ICD-10-CM

## 2018-03-21 DIAGNOSIS — N40.0 BENIGN PROSTATIC HYPERPLASIA, UNSPECIFIED WHETHER LOWER URINARY TRACT SYMPTOMS PRESENT: ICD-10-CM

## 2018-03-21 RX ORDER — PANTOPRAZOLE SODIUM 40 MG/1
40 TABLET, DELAYED RELEASE ORAL
Qty: 90 TABLET | Refills: 0 | Status: SHIPPED | OUTPATIENT
Start: 2018-03-21 | End: 2018-06-13

## 2018-03-21 NOTE — TELEPHONE ENCOUNTER
LOV: 3/5/18 Last Rx: 10/30/17    No protocol     Please advise in regards to refill request. Thank You          Refill Protocol Appointment Criteria  · Appointment scheduled in the past 12 months or in the next 3 months  Recent Outpatient Visits

## 2018-03-21 NOTE — TELEPHONE ENCOUNTER
From: Cori Spears  Sent: 3/21/2018 11:23 AM CDT  Subject: Medication Renewal Request    Trey [place] would like a refill of the following medications:     tamsulosin HCl 0.4 MG Oral Cap Veronica Fontanez, DO]     Pantoprazole Sodium 40 MG Oral

## 2018-03-22 RX ORDER — TAMSULOSIN HYDROCHLORIDE 0.4 MG/1
0.4 CAPSULE ORAL DAILY
Qty: 90 CAPSULE | Refills: 1 | Status: SHIPPED | OUTPATIENT
Start: 2018-03-22 | End: 2019-07-10

## 2018-03-22 RX ORDER — POTASSIUM CHLORIDE 750 MG/1
10 TABLET, EXTENDED RELEASE ORAL DAILY
Qty: 30 TABLET | Refills: 0 | Status: SHIPPED | OUTPATIENT
Start: 2018-03-22 | End: 2018-04-17

## 2018-03-22 NOTE — TELEPHONE ENCOUNTER
Please advise on refill request. Patient is completely out of medication.      Recent Outpatient Visits            2 weeks ago History of lower GI bleeding    [facility], [facility], [facility], Darryl Barrera APN    Office Visit    2 weeks ago [facility]

## 2018-03-22 NOTE — TELEPHONE ENCOUNTER
From: Ishaan Trotter  To: Gracia Rowland DO  Sent: 3/21/2018 11:34 AM CDT  Subject: Prescription Question    I am out of potassium chloride 10 mg need a refill

## 2018-04-11 PROBLEM — K21.9 GASTROESOPHAGEAL REFLUX: Status: ACTIVE | Noted: 2018-04-11

## 2018-04-11 PROBLEM — I25.2 HISTORY OF MI (MYOCARDIAL INFARCTION): Status: ACTIVE | Noted: 2018-04-11

## 2018-04-11 PROBLEM — I77.1 TORTUOUS AORTA (HCC): Status: ACTIVE | Noted: 2018-04-11

## 2018-04-11 PROBLEM — Z95.810 PRESENCE OF CARDIAC DEFIBRILLATOR: Status: ACTIVE | Noted: 2018-04-11

## 2018-04-11 PROBLEM — I10 HYPERTENSION: Status: ACTIVE | Noted: 2018-04-11

## 2018-04-11 PROBLEM — N20.0 RENAL STONES: Status: ACTIVE | Noted: 2018-04-11

## 2018-04-11 PROBLEM — E66.9 OBESITY (BMI 30.0-34.9): Status: ACTIVE | Noted: 2018-04-11

## 2018-04-11 PROBLEM — E78.5 HYPERLIPIDEMIA: Status: ACTIVE | Noted: 2018-04-11

## 2018-04-13 ENCOUNTER — OFFICE VISIT (OUTPATIENT)
Dept: FAMILY MEDICINE CLINIC | Facility: CLINIC | Age: 68
End: 2018-04-13

## 2018-04-13 ENCOUNTER — PRIOR ORIGINAL RECORDS (OUTPATIENT)
Dept: OTHER | Age: 68
End: 2018-04-13

## 2018-04-13 ENCOUNTER — LAB ENCOUNTER (OUTPATIENT)
Dept: LAB | Age: 68
End: 2018-04-13
Attending: FAMILY MEDICINE
Payer: MEDICARE

## 2018-04-13 VITALS
HEIGHT: 74 IN | SYSTOLIC BLOOD PRESSURE: 100 MMHG | BODY MASS INDEX: 30.42 KG/M2 | DIASTOLIC BLOOD PRESSURE: 70 MMHG | RESPIRATION RATE: 16 BRPM | WEIGHT: 237 LBS | HEART RATE: 54 BPM | TEMPERATURE: 98 F

## 2018-04-13 DIAGNOSIS — Z23 NEED FOR ZOSTER VACCINATION: ICD-10-CM

## 2018-04-13 DIAGNOSIS — Z13.29 THYROID DISORDER SCREEN: ICD-10-CM

## 2018-04-13 DIAGNOSIS — Z11.59 NEED FOR HEPATITIS C SCREENING TEST: ICD-10-CM

## 2018-04-13 DIAGNOSIS — Z12.5 PROSTATE CANCER SCREENING: ICD-10-CM

## 2018-04-13 DIAGNOSIS — I77.1 TORTUOUS AORTA (HCC): ICD-10-CM

## 2018-04-13 DIAGNOSIS — E78.5 HYPERLIPIDEMIA, UNSPECIFIED HYPERLIPIDEMIA TYPE: ICD-10-CM

## 2018-04-13 DIAGNOSIS — Z23 NEED FOR TDAP VACCINATION: ICD-10-CM

## 2018-04-13 DIAGNOSIS — I10 ESSENTIAL HYPERTENSION: ICD-10-CM

## 2018-04-13 DIAGNOSIS — I10 ESSENTIAL HYPERTENSION: Primary | ICD-10-CM

## 2018-04-13 PROCEDURE — 86803 HEPATITIS C AB TEST: CPT

## 2018-04-13 PROCEDURE — G0439 PPPS, SUBSEQ VISIT: HCPCS | Performed by: FAMILY MEDICINE

## 2018-04-13 PROCEDURE — 80061 LIPID PANEL: CPT

## 2018-04-13 PROCEDURE — 99397 PER PM REEVAL EST PAT 65+ YR: CPT | Performed by: FAMILY MEDICINE

## 2018-04-13 PROCEDURE — 80053 COMPREHEN METABOLIC PANEL: CPT

## 2018-04-13 PROCEDURE — 85025 COMPLETE CBC W/AUTO DIFF WBC: CPT

## 2018-04-13 PROCEDURE — 90715 TDAP VACCINE 7 YRS/> IM: CPT | Performed by: FAMILY MEDICINE

## 2018-04-13 PROCEDURE — 90471 IMMUNIZATION ADMIN: CPT | Performed by: FAMILY MEDICINE

## 2018-04-13 PROCEDURE — 36415 COLL VENOUS BLD VENIPUNCTURE: CPT

## 2018-04-13 PROCEDURE — 84443 ASSAY THYROID STIM HORMONE: CPT

## 2018-04-13 PROCEDURE — 96160 PT-FOCUSED HLTH RISK ASSMT: CPT | Performed by: FAMILY MEDICINE

## 2018-04-13 NOTE — PROGRESS NOTES
HPI:    Patient ID: Delvin Ann is a 69year old male.     69year old AA male here for complete preventive care physical and for status update on any confirmed chronic medical illnesses and follow up on any previous labs or procedures that were sugges Pt takes 1 1/2 tablet daily. Disp:  Rfl:    losartan 100 MG Oral Tab Take 1 tablet (100 mg total) by mouth daily. Disp: 90 tablet Rfl: 1   aspirin 81 MG Oral Tab Take 81 mg by mouth nightly.    Disp:  Rfl:    furosemide 40 MG Oral Tab Take 40 mg by mouth applicable    Tetanus   Orders placed or performed in visit on 04/13/18  -TETANUS, DIPHTHERIA TOXOIDS AND ACELLULAR PERTUSIS VACCINE (TDAP), >7 YEARS, IM USE    Update Immunization Activity if applicable    Zoster (Not covered by Medicare Part B) No orders Pneumococcal?: No     Functional Ability     Bathing or Showering: Able without help    Toileting: Able without help    Dressing: Able without help    Eating: Able without help    Driving: Need some help    Preparing your meals: Able without help    Managi 97.6 °F (36.4 °C)    TempSrc: Oral    Weight: 237 lb (107.5 kg)    Height: 6' 2\" (1.88 m)          Body mass index is 30.43 kg/m². PHYSICAL EXAM:   Physical Exam    Constitutional: He is oriented to person, place, and time.  He appears well-developed an

## 2018-04-17 DIAGNOSIS — I10 ESSENTIAL HYPERTENSION: ICD-10-CM

## 2018-04-20 ENCOUNTER — PATIENT MESSAGE (OUTPATIENT)
Dept: GASTROENTEROLOGY | Facility: CLINIC | Age: 68
End: 2018-04-20

## 2018-04-20 NOTE — TELEPHONE ENCOUNTER
Please advise on refill request.     Hypertensive Medications  Protocol Criteria:  · Appointment scheduled in the past 6 months or in the next 3 months  · BMP or CMP in the past 12 months  · Creatinine result < 2  Recent Outpatient Visits            1 week

## 2018-04-22 RX ORDER — POTASSIUM CHLORIDE 750 MG/1
TABLET, EXTENDED RELEASE ORAL
Qty: 30 TABLET | Refills: 2 | Status: SHIPPED | OUTPATIENT
Start: 2018-04-22 | End: 2018-07-14

## 2018-04-22 RX ORDER — LOSARTAN POTASSIUM 100 MG/1
TABLET ORAL
Qty: 90 TABLET | Refills: 0 | Status: SHIPPED | OUTPATIENT
Start: 2018-04-22 | End: 2018-07-21

## 2018-04-24 NOTE — TELEPHONE ENCOUNTER
From: Adriano Wood  To: ADRIEL Ross  Sent: 4/20/2018 10:17 AM CDT  Subject: Prescription Question    I am scheduled for a colonoscopy on April 26, 2018 what prescriptions am I to stop taking and when?  Also when do I start taking PEG-3350 & opal

## 2018-04-26 ENCOUNTER — SURGERY (OUTPATIENT)
Age: 68
End: 2018-04-26

## 2018-04-26 ENCOUNTER — ANESTHESIA (OUTPATIENT)
Dept: ENDOSCOPY | Facility: HOSPITAL | Age: 68
End: 2018-04-26
Payer: MEDICARE

## 2018-04-26 ENCOUNTER — HOSPITAL ENCOUNTER (OUTPATIENT)
Facility: HOSPITAL | Age: 68
Setting detail: HOSPITAL OUTPATIENT SURGERY
Discharge: HOME | End: 2018-04-26
Attending: INTERNAL MEDICINE | Admitting: INTERNAL MEDICINE
Payer: MEDICARE

## 2018-04-26 ENCOUNTER — ANESTHESIA EVENT (OUTPATIENT)
Dept: ENDOSCOPY | Facility: HOSPITAL | Age: 68
End: 2018-04-26
Payer: MEDICARE

## 2018-04-26 VITALS
OXYGEN SATURATION: 99 % | HEIGHT: 74 IN | RESPIRATION RATE: 19 BRPM | SYSTOLIC BLOOD PRESSURE: 133 MMHG | HEART RATE: 67 BPM | BODY MASS INDEX: 30.42 KG/M2 | DIASTOLIC BLOOD PRESSURE: 74 MMHG | WEIGHT: 237 LBS

## 2018-04-26 DIAGNOSIS — Z86.010 HISTORY OF COLONIC POLYPS: ICD-10-CM

## 2018-04-26 DIAGNOSIS — Z87.19 HISTORY OF GI BLEED: ICD-10-CM

## 2018-04-26 PROCEDURE — 0DBM8ZX EXCISION OF DESCENDING COLON, VIA NATURAL OR ARTIFICIAL OPENING ENDOSCOPIC, DIAGNOSTIC: ICD-10-PCS | Performed by: INTERNAL MEDICINE

## 2018-04-26 PROCEDURE — 45385 COLONOSCOPY W/LESION REMOVAL: CPT | Performed by: INTERNAL MEDICINE

## 2018-04-26 PROCEDURE — 0DJ08ZZ INSPECTION OF UPPER INTESTINAL TRACT, VIA NATURAL OR ARTIFICIAL OPENING ENDOSCOPIC: ICD-10-PCS | Performed by: INTERNAL MEDICINE

## 2018-04-26 RX ORDER — LIDOCAINE HYDROCHLORIDE 10 MG/ML
INJECTION, SOLUTION EPIDURAL; INFILTRATION; INTRACAUDAL; PERINEURAL AS NEEDED
Status: DISCONTINUED | OUTPATIENT
Start: 2018-04-26 | End: 2018-04-26 | Stop reason: SURG

## 2018-04-26 RX ORDER — SODIUM CHLORIDE, SODIUM LACTATE, POTASSIUM CHLORIDE, CALCIUM CHLORIDE 600; 310; 30; 20 MG/100ML; MG/100ML; MG/100ML; MG/100ML
INJECTION, SOLUTION INTRAVENOUS CONTINUOUS
Status: DISCONTINUED | OUTPATIENT
Start: 2018-04-26 | End: 2018-04-26

## 2018-04-26 RX ADMIN — SODIUM CHLORIDE, SODIUM LACTATE, POTASSIUM CHLORIDE, CALCIUM CHLORIDE: 600; 310; 30; 20 INJECTION, SOLUTION INTRAVENOUS at 09:55:00

## 2018-04-26 RX ADMIN — SODIUM CHLORIDE, SODIUM LACTATE, POTASSIUM CHLORIDE, CALCIUM CHLORIDE: 600; 310; 30; 20 INJECTION, SOLUTION INTRAVENOUS at 10:02:00

## 2018-04-26 RX ADMIN — SODIUM CHLORIDE, SODIUM LACTATE, POTASSIUM CHLORIDE, CALCIUM CHLORIDE: 600; 310; 30; 20 INJECTION, SOLUTION INTRAVENOUS at 09:50:00

## 2018-04-26 RX ADMIN — LIDOCAINE HYDROCHLORIDE 50 MG: 10 INJECTION, SOLUTION EPIDURAL; INFILTRATION; INTRACAUDAL; PERINEURAL at 09:51:00

## 2018-04-26 RX ADMIN — SODIUM CHLORIDE, SODIUM LACTATE, POTASSIUM CHLORIDE, CALCIUM CHLORIDE: 600; 310; 30; 20 INJECTION, SOLUTION INTRAVENOUS at 10:10:00

## 2018-04-26 NOTE — OPERATIVE REPORT
[facility]P - [facility] Endoscopy Report      Preoperative Diagnosis:  - colon polyp noted on prior colonoscopy from Feb ( not removed due to active bleed)      Postoperative Diagnosis:  - colon polyps x 2  - diverticulosis  - small internal hemorrhoids hemorrhoids noted on retroflexion.        Impression:  - colon polyps x 2  - diverticulosis  - small internal hemorrhoids    Recommendations:  - Post polypectomy instructions given  - Repeat colonoscopy in 5 years  - High fiber diet for diverticular disease

## 2018-04-26 NOTE — H&P
History & Physical Examination    Patient Name: Yennifer Fernando  MRN: [id number]  [facility]: [id number]  YOB: 1950    Diagnosis: history of colon polyp noted on prior colonoscopy from 2 months ago    Prescriptions Prior to Admission:  LOSARTAN 100 • Kidney stones      Past Surgical History:  2004: ANGIOPLASTY (CORONARY)  No date: BACK SURGERY  No date: CARDIAC DEFIBRILLATOR PLACEMENT  2/23/2018: COLONOSCOPY N/A      Comment: Procedure: COLONOSCOPY;  Surgeon:                Sejal Galdamez

## 2018-04-26 NOTE — ANESTHESIA POSTPROCEDURE EVALUATION
Patient: Phu Canela    Procedure Summary     Date:  04/26/18 Room / Location:  [address] [place] [address] ENDOSCOPY 05 / [address] [place] [address] ENDOSCOPY    Anesthesia Start:  3156 Anesthesia Stop:      Procedure:  COLONOSCOPY (N/A ) Diagnosis:       History of colonic polyps      History of

## 2018-04-26 NOTE — ANESTHESIA PREPROCEDURE EVALUATION
Anesthesia PreOp Note    HPI:     Kavin Milligan is a 69year old male who presents for preoperative consultation requested by: Melia Brar MD    Date of Surgery: 4/26/2018    Procedure(s):  COLONOSCOPY  Indication: History of colonic polyps, Histo Miya Ambriz MD;  Location: [address] [place] [address] ENDOSCOPY      Prescriptions Prior to Admission:  LOSARTAN 100 MG Oral Tab TAKE 1 TABLET(100 MG) BY MOUTH DAILY Disp: 90 tablet Rfl: 0 4/26/2018 at 0500   POTASSIUM CHLORIDE ER 10 MEQ Oral Tab CR TAKE ONE TABLET BY MOUTH E 04/13/2018   HGB 12.7 (L) 04/13/2018   HCT 38.3 (L) 04/13/2018   MCV 82.5 04/13/2018   MCH 27.3 04/13/2018   MCHC 33.1 04/13/2018   RDW 14.9 04/13/2018    04/13/2018   MPV 7.2 (L) 04/13/2018       Lab Results  Component Value Date    04/13/201

## 2018-05-02 ENCOUNTER — TELEPHONE (OUTPATIENT)
Dept: GASTROENTEROLOGY | Facility: CLINIC | Age: 68
End: 2018-05-02

## 2018-05-02 NOTE — TELEPHONE ENCOUNTER
----- Message from Heather Nuno MD sent at 4/30/2018  6:17 PM CDT -----  I wanted to get back to you with your colonoscopy results. You had 2 colon polyps removed which were benign.   I would advise a repeat colonoscopy in 5 years to make sure no new p

## 2018-05-07 ENCOUNTER — MYAURORA ACCOUNT LINK (OUTPATIENT)
Dept: OTHER | Age: 68
End: 2018-05-07

## 2018-06-13 DIAGNOSIS — K21.9 GASTROESOPHAGEAL REFLUX DISEASE, ESOPHAGITIS PRESENCE NOT SPECIFIED: ICD-10-CM

## 2018-06-13 RX ORDER — PANTOPRAZOLE SODIUM 40 MG/1
TABLET, DELAYED RELEASE ORAL
Qty: 90 TABLET | Refills: 0 | Status: SHIPPED | OUTPATIENT
Start: 2018-06-13 | End: 2018-09-06

## 2018-06-13 NOTE — TELEPHONE ENCOUNTER
Rx approved for 90 days per protocol.     Refill Protocol Appointment Criteria  · Appointment scheduled in the past 12 months or in the next 3 months  Recent Outpatient Visits            2 months ago Essential hypertension    [facility], [facility], [address], [employer]

## 2018-07-16 RX ORDER — POTASSIUM CHLORIDE 750 MG/1
TABLET, EXTENDED RELEASE ORAL
Qty: 90 TABLET | Refills: 0 | Status: SHIPPED | OUTPATIENT
Start: 2018-07-16 | End: 2018-10-09

## 2018-07-17 NOTE — TELEPHONE ENCOUNTER
LOV: 4-13-18 Last Rx: 4-22-18     No protocol     Please advise in regards to refill request. Thank You

## 2018-07-21 DIAGNOSIS — I10 ESSENTIAL HYPERTENSION: ICD-10-CM

## 2018-07-21 RX ORDER — LOSARTAN POTASSIUM 100 MG/1
TABLET ORAL
Qty: 90 TABLET | Refills: 0 | Status: SHIPPED | OUTPATIENT
Start: 2018-07-21 | End: 2018-10-14

## 2018-08-10 LAB
ALBUMIN: 4 G/DL
ALKALINE PHOSPHATATE(ALK PHOS): 72 IU/L
ALT (SGPT): 22 U/L
AST (SGOT): 28 U/L
BILIRUBIN TOTAL: 0.6 MG/DL
BUN: 6 MG/DL
CALCIUM: 9.3 MG/DL
CHLORIDE: 104 MEQ/L
CHOLESTEROL, TOTAL: 83 MG/DL
CREATININE, SERUM: 1.16 MG/DL
GLOBULIN: 2.9 G/DL
GLUCOSE: 93 MG/DL
GLUCOSE: 93 MG/DL
HDL CHOLESTEROL: 31 MG/DL
LDL CHOLESTEROL: 27 MG/DL
NON-HDL CHOLESTEROL: 52 MG/DL
POTASSIUM, SERUM: 3.6 MEQ/L
PROTEIN, TOTAL: 6.9 G/DL
SGOT (AST): 28 IU/L
SGPT (ALT): 22 IU/L
SODIUM: 140 MEQ/L
THYROID STIMULATING HORMONE: 1.53 MLU/L
TRIGLYCERIDES: 127 MG/DL

## 2018-08-31 ENCOUNTER — PATIENT MESSAGE (OUTPATIENT)
Dept: FAMILY MEDICINE CLINIC | Facility: CLINIC | Age: 68
End: 2018-08-31

## 2018-09-01 NOTE — TELEPHONE ENCOUNTER
From: Kala Jefferson  To: Hero Kennedy,   Sent: 8/31/2018 11:03 AM CDT  Subject: Other    Please reschedule my appointment that I was unable to keep on August 13, 2018.  Thanks

## 2018-09-05 ENCOUNTER — MYAURORA ACCOUNT LINK (OUTPATIENT)
Dept: OTHER | Age: 68
End: 2018-09-05

## 2018-09-06 ENCOUNTER — MYAURORA ACCOUNT LINK (OUTPATIENT)
Dept: OTHER | Age: 68
End: 2018-09-06

## 2018-09-06 ENCOUNTER — PRIOR ORIGINAL RECORDS (OUTPATIENT)
Dept: OTHER | Age: 68
End: 2018-09-06

## 2018-09-06 DIAGNOSIS — K21.9 GASTROESOPHAGEAL REFLUX DISEASE, ESOPHAGITIS PRESENCE NOT SPECIFIED: ICD-10-CM

## 2018-09-06 RX ORDER — PANTOPRAZOLE SODIUM 40 MG/1
TABLET, DELAYED RELEASE ORAL
Qty: 90 TABLET | Refills: 0 | Status: SHIPPED | OUTPATIENT
Start: 2018-09-06 | End: 2018-11-30

## 2018-09-07 NOTE — TELEPHONE ENCOUNTER
Refill Protocol Appointment Criteria  · Appointment scheduled in the past 12 months or in the next 3 months  Recent Outpatient Visits            4 months ago Essential hypertension    [facility], [facility], [address] [employer], [place], [place]    Office

## 2018-10-02 ENCOUNTER — APPOINTMENT (OUTPATIENT)
Dept: LAB | Age: 68
End: 2018-10-02
Attending: FAMILY MEDICINE
Payer: MEDICARE

## 2018-10-02 ENCOUNTER — OFFICE VISIT (OUTPATIENT)
Dept: FAMILY MEDICINE CLINIC | Facility: CLINIC | Age: 68
End: 2018-10-02
Payer: MEDICARE

## 2018-10-02 VITALS
WEIGHT: 237 LBS | HEIGHT: 74 IN | RESPIRATION RATE: 17 BRPM | SYSTOLIC BLOOD PRESSURE: 147 MMHG | BODY MASS INDEX: 30.42 KG/M2 | DIASTOLIC BLOOD PRESSURE: 77 MMHG | HEART RATE: 54 BPM

## 2018-10-02 DIAGNOSIS — Z23 FLU VACCINE NEED: ICD-10-CM

## 2018-10-02 DIAGNOSIS — R97.20 ELEVATED PROSTATE SPECIFIC ANTIGEN (PSA): ICD-10-CM

## 2018-10-02 DIAGNOSIS — R97.20 ELEVATED PROSTATE SPECIFIC ANTIGEN (PSA): Primary | ICD-10-CM

## 2018-10-02 PROCEDURE — 84153 ASSAY OF PSA TOTAL: CPT

## 2018-10-02 PROCEDURE — 36415 COLL VENOUS BLD VENIPUNCTURE: CPT

## 2018-10-02 PROCEDURE — G0463 HOSPITAL OUTPT CLINIC VISIT: HCPCS | Performed by: FAMILY MEDICINE

## 2018-10-02 PROCEDURE — 99214 OFFICE O/P EST MOD 30 MIN: CPT | Performed by: FAMILY MEDICINE

## 2018-10-02 NOTE — PROGRESS NOTES
HPI:    Patient ID: Sonny Clay is a 77year old male. 77year old AA male with an elevated PSA from 03/2018. Needs a repeat PSA (asymptomatic). Needs a flu vaccine as well.         Review of Systems         Current Outpatient Medications:  PANTOPRA [43410]      Meds This Visit:  Requested Prescriptions      No prescriptions requested or ordered in this encounter       Imaging & Referrals:  FLU VACCINE ADJUVANT IM  Patient Instructions   PSA redraw. Keep urology appointments.   Monitor blood pressures

## 2018-10-02 NOTE — PATIENT INSTRUCTIONS
PSA redraw. Keep urology appointments. Monitor blood pressures and record at home. Limit salt intake. Recommend weight loss via daily exercising and consistent healthy dietary changes. Comply with medications. Flu vaccine ordered and administered.

## 2018-10-04 DIAGNOSIS — E78.2 MIXED HYPERLIPIDEMIA: ICD-10-CM

## 2018-10-05 RX ORDER — SIMVASTATIN 80 MG
TABLET ORAL
Qty: 90 TABLET | Refills: 0 | Status: SHIPPED | OUTPATIENT
Start: 2018-10-05 | End: 2018-12-28

## 2018-10-10 ENCOUNTER — PATIENT MESSAGE (OUTPATIENT)
Dept: FAMILY MEDICINE CLINIC | Facility: CLINIC | Age: 68
End: 2018-10-10

## 2018-10-10 DIAGNOSIS — R97.20 ELEVATED PSA, BETWEEN 10 AND LESS THAN 20 NG/ML: Primary | ICD-10-CM

## 2018-10-10 NOTE — TELEPHONE ENCOUNTER
From: Thee Steve  To: Jacquie Rosales DO  Sent: 10/10/2018 2:43 PM CDT  Subject: Test Results Question    Good afternoon Dr. Tk Payton I received my test results concerning the PSA and I would like to have the test done again given enough time th

## 2018-10-11 RX ORDER — POTASSIUM CHLORIDE 750 MG/1
TABLET, EXTENDED RELEASE ORAL
Qty: 90 TABLET | Refills: 0 | Status: SHIPPED | OUTPATIENT
Start: 2018-10-11 | End: 2019-01-03

## 2018-10-12 ENCOUNTER — PATIENT MESSAGE (OUTPATIENT)
Dept: FAMILY MEDICINE CLINIC | Facility: CLINIC | Age: 68
End: 2018-10-12

## 2018-10-13 NOTE — TELEPHONE ENCOUNTER
From: Navin Stallworth  To: Darryl Nunn DO  Sent: 10/12/2018 5:43 PM CDT  Subject: Test Results Question    I have a question about PSA TOTAL W REFLEX TO FREE resulted on 10/2/18, 9:38 PM.  What is the next step?

## 2018-10-14 DIAGNOSIS — I10 ESSENTIAL HYPERTENSION: ICD-10-CM

## 2018-10-14 NOTE — PROGRESS NOTES
Spoke with patient. Test discussed and next step in assessment in place. Repeat PSA in 3 months. Order is on chart.

## 2018-10-15 RX ORDER — LOSARTAN POTASSIUM 100 MG/1
TABLET ORAL
Qty: 90 TABLET | Refills: 0 | Status: SHIPPED | OUTPATIENT
Start: 2018-10-15 | End: 2019-01-08

## 2018-10-24 ENCOUNTER — PATIENT MESSAGE (OUTPATIENT)
Dept: FAMILY MEDICINE CLINIC | Facility: CLINIC | Age: 68
End: 2018-10-24

## 2018-10-24 NOTE — TELEPHONE ENCOUNTER
Please advise on refill request.     Hypertensive Medications  Protocol Criteria:  · Appointment scheduled in the past 6 months or in the next 3 months  · BMP or CMP in the past 12 months  · Creatinine result < 2  Recent Outpatient Visits            3 week

## 2018-10-24 NOTE — TELEPHONE ENCOUNTER
From: Delvin Ann  To: Aura Peterson DO  Sent: 10/24/2018 11:59 AM CDT  Subject: Prescription Question    Need a refill on the METOPROLOL 150mg (1 1/2 Tab daily)

## 2018-10-25 RX ORDER — METOPROLOL SUCCINATE 100 MG/1
150 TABLET, EXTENDED RELEASE ORAL DAILY
Qty: 45 TABLET | Refills: 2 | Status: SHIPPED | OUTPATIENT
Start: 2018-10-25 | End: 2019-07-09

## 2018-11-30 DIAGNOSIS — K21.9 GASTROESOPHAGEAL REFLUX DISEASE, ESOPHAGITIS PRESENCE NOT SPECIFIED: ICD-10-CM

## 2018-11-30 RX ORDER — PANTOPRAZOLE SODIUM 40 MG/1
TABLET, DELAYED RELEASE ORAL
Qty: 90 TABLET | Refills: 0 | Status: SHIPPED | OUTPATIENT
Start: 2018-11-30 | End: 2019-02-22

## 2018-12-05 DIAGNOSIS — N40.0 BENIGN PROSTATIC HYPERPLASIA, UNSPECIFIED WHETHER LOWER URINARY TRACT SYMPTOMS PRESENT: ICD-10-CM

## 2018-12-06 RX ORDER — TAMSULOSIN HYDROCHLORIDE 0.4 MG/1
CAPSULE ORAL
Qty: 90 CAPSULE | Refills: 0 | Status: SHIPPED | OUTPATIENT
Start: 2018-12-06 | End: 2019-01-07

## 2018-12-07 NOTE — TELEPHONE ENCOUNTER
Requested Prescriptions     Pending Prescriptions Disp Refills   • TAMSULOSIN HCL 0.4 MG Oral Cap [Pharmacy Med Name: TAMSULOSIN 0.4MG CAPSULES] 90 capsule 0     Sig: TAKE 1 CAPSULE(0.4 MG) BY MOUTH DAILY       Last Office Visit with PCP: 10/2/2018    Edward Lazo

## 2018-12-10 ENCOUNTER — MYAURORA ACCOUNT LINK (OUTPATIENT)
Dept: OTHER | Age: 68
End: 2018-12-10

## 2018-12-28 DIAGNOSIS — E78.2 MIXED HYPERLIPIDEMIA: ICD-10-CM

## 2018-12-28 RX ORDER — SIMVASTATIN 80 MG
TABLET ORAL
Qty: 90 TABLET | Refills: 0 | Status: SHIPPED | OUTPATIENT
Start: 2018-12-28 | End: 2019-03-25

## 2019-01-01 ENCOUNTER — TELEPHONE (OUTPATIENT)
Dept: HEMATOLOGY/ONCOLOGY | Facility: HOSPITAL | Age: 69
End: 2019-01-01

## 2019-01-01 DIAGNOSIS — E78.2 MIXED HYPERLIPIDEMIA: ICD-10-CM

## 2019-01-01 DIAGNOSIS — C61 PROSTATE CANCER (HCC): ICD-10-CM

## 2019-01-01 RX ORDER — BICALUTAMIDE 50 MG/1
TABLET, FILM COATED ORAL
Qty: 30 TABLET | OUTPATIENT
Start: 2019-01-01

## 2019-01-01 RX ORDER — SIMVASTATIN 80 MG
TABLET ORAL
Qty: 90 TABLET | Refills: 2 | Status: SHIPPED | OUTPATIENT
Start: 2019-01-01 | End: 2020-01-01

## 2019-01-03 RX ORDER — POTASSIUM CHLORIDE 750 MG/1
TABLET, EXTENDED RELEASE ORAL
Qty: 90 TABLET | Refills: 0 | Status: SHIPPED | OUTPATIENT
Start: 2019-01-03 | End: 2019-03-30

## 2019-01-07 ENCOUNTER — OFFICE VISIT (OUTPATIENT)
Dept: FAMILY MEDICINE CLINIC | Facility: CLINIC | Age: 69
End: 2019-01-07
Payer: COMMERCIAL

## 2019-01-07 VITALS
HEIGHT: 74 IN | HEART RATE: 58 BPM | SYSTOLIC BLOOD PRESSURE: 112 MMHG | DIASTOLIC BLOOD PRESSURE: 76 MMHG | WEIGHT: 245 LBS | BODY MASS INDEX: 31.44 KG/M2 | RESPIRATION RATE: 16 BRPM

## 2019-01-07 DIAGNOSIS — D48.5 NEOPLASM OF UNCERTAIN BEHAVIOR OF SKIN OF FOOT: ICD-10-CM

## 2019-01-07 DIAGNOSIS — I10 ESSENTIAL HYPERTENSION: Primary | ICD-10-CM

## 2019-01-07 PROCEDURE — G0463 HOSPITAL OUTPT CLINIC VISIT: HCPCS | Performed by: FAMILY MEDICINE

## 2019-01-07 PROCEDURE — 99213 OFFICE O/P EST LOW 20 MIN: CPT | Performed by: FAMILY MEDICINE

## 2019-01-07 NOTE — PATIENT INSTRUCTIONS
Monitor blood pressures and record at home. Limit salt intake. Recommend weight loss via daily exercising and consistent healthy dietary changes. Comply with medications. Medication reviewed and renewed where needed and appropriate. To general surgery.

## 2019-01-07 NOTE — PROGRESS NOTES
HPI:    Patient ID: Bishop Hunt is a 77year old male. Hypertension   This is a chronic problem. The current episode started more than 1 year ago. The problem is unchanged. The problem is controlled.  Pertinent negatives include no chest pain, heada tamsulosin HCl 0.4 MG Oral Cap Take 1 capsule (0.4 mg total) by mouth daily. Disp: 90 capsule Rfl: 1   furosemide 40 MG Oral Tab Take 40 mg by mouth nightly. Disp:  Rfl:    aspirin 81 MG Oral Tab Take 81 mg by mouth nightly.    Disp:  Rfl:    nitroGLYCERIN

## 2019-01-08 ENCOUNTER — TELEPHONE (OUTPATIENT)
Dept: SURGERY | Facility: CLINIC | Age: 69
End: 2019-01-08

## 2019-01-08 DIAGNOSIS — I10 ESSENTIAL HYPERTENSION: ICD-10-CM

## 2019-01-08 RX ORDER — LOSARTAN POTASSIUM 100 MG/1
TABLET ORAL
Qty: 90 TABLET | Refills: 0 | Status: SHIPPED | OUTPATIENT
Start: 2019-01-08 | End: 2019-04-08

## 2019-01-08 NOTE — TELEPHONE ENCOUNTER
Please inform patient that Dr. Jannette Turner recommends patient first schedule an appointment with Dermatology for a complete skin exam and a biopsy of his foot lesion.  If Dermatology recommends surgical consultation after the biopsy, then schedule a consultation

## 2019-01-08 NOTE — TELEPHONE ENCOUNTER
Mona Koyanagi, MD Thersa Hanlon, JANET             I would recommend a derm eval for a complete skin exam and bx of lesion as needed.  If benign then maybe no further action needed.  If malignant then surgical eval here and may need to coordinate multispeci

## 2019-01-08 NOTE — TELEPHONE ENCOUNTER
Pt was referred to [facility] by DR. 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 Pls advise if pt can be seen.

## 2019-01-09 NOTE — TELEPHONE ENCOUNTER
Refill passed per [facility] protocol.   Hypertensive Medications  Protocol Criteria:  · Appointment scheduled in the past 6 months or in the next 3 months  · BMP or CMP in the past 12 months  · Creatinine result < 2  Recent Outpatient Visits

## 2019-01-14 ENCOUNTER — PATIENT OUTREACH (OUTPATIENT)
Dept: CASE MANAGEMENT | Age: 69
End: 2019-01-14

## 2019-01-14 NOTE — PROGRESS NOTES
Patient returned my call and scheduled his Medicare Annual Exam [facility]) with his PCP for 03/14/2019. Thank you.

## 2019-01-14 NOTE — PROGRESS NOTES
Outreached to patient in regards to scheduling Medicare Annual exam (AHA). Left message for patient to return my call at ext. [phone]. Patient is eligible at this time. Thank you.

## 2019-02-01 ENCOUNTER — TELEPHONE (OUTPATIENT)
Dept: SURGERY | Facility: CLINIC | Age: 69
End: 2019-02-01

## 2019-02-01 NOTE — TELEPHONE ENCOUNTER
Received pathology results from [facility]. Per Dr. Best Pal patient to have consultation with him next week. Contacted patient, patient accepted appointment 02/06/2019 at [phone]. Patient currently in [place] and will return Monday morning sometime.  Advised

## 2019-02-06 ENCOUNTER — OFFICE VISIT (OUTPATIENT)
Dept: SURGERY | Facility: CLINIC | Age: 69
End: 2019-02-06
Payer: COMMERCIAL

## 2019-02-06 VITALS — WEIGHT: 243 LBS | BODY MASS INDEX: 31.18 KG/M2 | HEIGHT: 74 IN

## 2019-02-06 DIAGNOSIS — C43.9 ACRAL LENTIGINOUS MELANOMA (HCC): Primary | ICD-10-CM

## 2019-02-06 PROCEDURE — G0463 HOSPITAL OUTPT CLINIC VISIT: HCPCS | Performed by: SURGERY

## 2019-02-06 PROCEDURE — 99204 OFFICE O/P NEW MOD 45 MIN: CPT | Performed by: SURGERY

## 2019-02-11 ENCOUNTER — NURSE TRIAGE (OUTPATIENT)
Dept: OTHER | Age: 69
End: 2019-02-11

## 2019-02-11 DIAGNOSIS — M79.672 LEFT FOOT PAIN: ICD-10-CM

## 2019-02-11 DIAGNOSIS — C43.72 MELANOMA OF FOOT, LEFT (HCC): Primary | ICD-10-CM

## 2019-02-11 NOTE — TELEPHONE ENCOUNTER
Regarding: Other  Contact: [phone]  ----- Message from [address] sent at 2/10/2019 11:57 PM CST -----    The pain in my foot becomes more excruciating every day I plan to keep a diary or you updated until we have a resolution.  I am not of what I

## 2019-02-11 NOTE — TELEPHONE ENCOUNTER
Dr. Placido Mosqueda: patient is asking what pain reliever he can take for his heel pain. Also needs referrals for cardiology and plastic surgeon.    [facility] referral already approved)    Hx of malignant melanoma, Left plantar heel  Has sudden heel pain, sharp/sh

## 2019-02-12 ENCOUNTER — PRIOR ORIGINAL RECORDS (OUTPATIENT)
Dept: OTHER | Age: 69
End: 2019-02-12

## 2019-02-12 NOTE — H&P
History and Physical      Bishop Hunt is a 77year old male. HPI   Patient presents with:  Melanoma: Pt noted a black spot on sole of left foot over 1 1/2 years ago. Went to derm and biopsy was done 11/29/2018- malignant melanoma.  No pain now but TABLET(40 MG) BY MOUTH EVERY MORNING BEFORE BREAKFAST Disp: 90 tablet Rfl: 0   Metoprolol Succinate  MG Oral Tablet 24 Hr Take 1.5 tablets (150 mg total) by mouth daily. Pt takes 1 1/2 tablet daily.  Disp: 45 tablet Rfl: 2   tamsulosin HCl 0.4 MG Oral rhythm no murmurs, gallups, or rubs  Abdomen: soft non-tender non-distended no organomegaly noted no masses  Extremities: no edema, cyanosis, or clubbing  Neurological: exam appropriate for age reflexes and motor skills appropriate for age   Skin: 1.5cm dr

## 2019-02-12 NOTE — H&P (VIEW-ONLY)
History and Physical      Sara Horn is a 77year old male. HPI   Patient presents with:  Melanoma: Pt noted a black spot on sole of left foot over 1 1/2 years ago. Went to derm and biopsy was done 11/29/2018- malignant melanoma.  No pain now but TABLET(40 MG) BY MOUTH EVERY MORNING BEFORE BREAKFAST Disp: 90 tablet Rfl: 0   Metoprolol Succinate  MG Oral Tablet 24 Hr Take 1.5 tablets (150 mg total) by mouth daily. Pt takes 1 1/2 tablet daily.  Disp: 45 tablet Rfl: 2   tamsulosin HCl 0.4 MG Oral rhythm no murmurs, gallups, or rubs  Abdomen: soft non-tender non-distended no organomegaly noted no masses  Extremities: no edema, cyanosis, or clubbing  Neurological: exam appropriate for age reflexes and motor skills appropriate for age   Skin: 1.5cm dr

## 2019-02-13 ENCOUNTER — TELEPHONE (OUTPATIENT)
Dept: SURGERY | Facility: CLINIC | Age: 69
End: 2019-02-13

## 2019-02-13 ENCOUNTER — TELEPHONE (OUTPATIENT)
Dept: OTHER | Age: 69
End: 2019-02-13

## 2019-02-13 RX ORDER — ACETAMINOPHEN AND CODEINE PHOSPHATE 300; 30 MG/1; MG/1
1 TABLET ORAL EVERY 4 HOURS PRN
Qty: 40 TABLET | Refills: 0 | OUTPATIENT
Start: 2019-02-13 | End: 2019-03-26

## 2019-02-13 NOTE — TELEPHONE ENCOUNTER
Will send to [facility] care=please follow up for insurance approval for the order of  US L groin and referral for cardiology and plastic surgeon referral.Patient is calling.

## 2019-02-13 NOTE — TELEPHONE ENCOUNTER
I have signed off on the referrals. And I have also put in the pharmacy template and signed off on Tylenol 3 quantity of 40 to be phoned into the pharmacy and wants to do that please call the patient and let him know.

## 2019-02-13 NOTE — TELEPHONE ENCOUNTER
----- Message from Tegan Herbert MD sent at 2/12/2019  7:06 PM CST -----  Please contact pt to have him get groin US done as per my note - order placed, and help facilitate timely evals by plastics and derm and cardiology.

## 2019-02-13 NOTE — TELEPHONE ENCOUNTER
Contacted patient, informed him of US groin. Patient states he is aware and has updated Dr. Fatimah Salazar about this. He will call to schedule. He also received referral for Dermatology and has appt with Dr. Juan Kenney on 02/20/2019.  He is awaiting referral for sindy

## 2019-02-13 NOTE — TELEPHONE ENCOUNTER
Patient does not need a referral for an U/S. All he needs is an order. Please contact patient for U/S.      Thank you, Karel

## 2019-02-13 NOTE — TELEPHONE ENCOUNTER
Spoke with patient and advised Dr Kymberly Ng note and verbalized understanding. Plastic Surgeon office number given [phone], cardiology office number not given as patient states that he has the number. Will phone in the prescription to his pharmacy.

## 2019-02-14 NOTE — TELEPHONE ENCOUNTER
Spoke with patient, advised that US groin does not need an approval as long as there is an order, with existing order, advised to call Central Scheduling to schedule for appointment.  Advised that referral for cardiology and plastic surgeon does not need an

## 2019-02-19 ENCOUNTER — HOSPITAL ENCOUNTER (OUTPATIENT)
Dept: ULTRASOUND IMAGING | Facility: HOSPITAL | Age: 69
Discharge: HOME | End: 2019-02-19
Attending: SURGERY
Payer: MEDICARE

## 2019-02-19 DIAGNOSIS — C43.9 ACRAL LENTIGINOUS MELANOMA (HCC): ICD-10-CM

## 2019-02-19 PROCEDURE — 76882 US LMTD JT/FCL EVL NVASC XTR: CPT | Performed by: SURGERY

## 2019-02-20 ENCOUNTER — OFFICE VISIT (OUTPATIENT)
Dept: DERMATOLOGY CLINIC | Facility: CLINIC | Age: 69
End: 2019-02-20
Payer: COMMERCIAL

## 2019-02-20 DIAGNOSIS — C43.72: Primary | ICD-10-CM

## 2019-02-20 DIAGNOSIS — D23.5 BENIGN NEOPLASM OF SKIN OF TRUNK, EXCEPT SCROTUM: ICD-10-CM

## 2019-02-20 DIAGNOSIS — D23.30 BENIGN NEOPLASM OF SKIN OF FACE: ICD-10-CM

## 2019-02-20 DIAGNOSIS — D22.9 MULTIPLE NEVI: ICD-10-CM

## 2019-02-20 DIAGNOSIS — D23.70 BENIGN NEOPLASM OF SKIN OF LOWER LIMB, INCLUDING HIP, UNSPECIFIED LATERALITY: ICD-10-CM

## 2019-02-20 DIAGNOSIS — D23.4 BENIGN NEOPLASM OF SCALP AND SKIN OF NECK: ICD-10-CM

## 2019-02-20 DIAGNOSIS — D23.60 BENIGN NEOPLASM OF SKIN OF UPPER LIMB, INCLUDING SHOULDER, UNSPECIFIED LATERALITY: ICD-10-CM

## 2019-02-20 PROCEDURE — 99203 OFFICE O/P NEW LOW 30 MIN: CPT | Performed by: DERMATOLOGY

## 2019-02-20 PROCEDURE — G0463 HOSPITAL OUTPT CLINIC VISIT: HCPCS | Performed by: DERMATOLOGY

## 2019-02-22 DIAGNOSIS — K21.9 GASTROESOPHAGEAL REFLUX DISEASE, ESOPHAGITIS PRESENCE NOT SPECIFIED: ICD-10-CM

## 2019-02-22 RX ORDER — PANTOPRAZOLE SODIUM 40 MG/1
TABLET, DELAYED RELEASE ORAL
Qty: 90 TABLET | Refills: 0 | Status: SHIPPED | OUTPATIENT
Start: 2019-02-22 | End: 2019-05-21

## 2019-02-28 ENCOUNTER — OFFICE VISIT (OUTPATIENT)
Dept: SURGERY | Facility: CLINIC | Age: 69
End: 2019-02-28
Payer: COMMERCIAL

## 2019-02-28 ENCOUNTER — TELEPHONE (OUTPATIENT)
Dept: SURGERY | Facility: CLINIC | Age: 69
End: 2019-02-28

## 2019-02-28 VITALS
DIASTOLIC BLOOD PRESSURE: 70 MMHG | SYSTOLIC BLOOD PRESSURE: 120 MMHG | BODY MASS INDEX: 32.6 KG/M2 | HEART RATE: 68 BPM | OXYGEN SATURATION: 97 % | WEIGHT: 254 LBS | HEIGHT: 74 IN

## 2019-02-28 VITALS
HEART RATE: 60 BPM | WEIGHT: 246 LBS | DIASTOLIC BLOOD PRESSURE: 64 MMHG | BODY MASS INDEX: 31.57 KG/M2 | HEIGHT: 74 IN | SYSTOLIC BLOOD PRESSURE: 122 MMHG

## 2019-02-28 VITALS
HEIGHT: 74 IN | OXYGEN SATURATION: 96 % | SYSTOLIC BLOOD PRESSURE: 118 MMHG | HEART RATE: 63 BPM | WEIGHT: 250 LBS | BODY MASS INDEX: 32.08 KG/M2 | DIASTOLIC BLOOD PRESSURE: 72 MMHG

## 2019-02-28 VITALS
HEART RATE: 62 BPM | SYSTOLIC BLOOD PRESSURE: 124 MMHG | WEIGHT: 253 LBS | DIASTOLIC BLOOD PRESSURE: 60 MMHG | HEIGHT: 74 IN | BODY MASS INDEX: 32.47 KG/M2

## 2019-02-28 DIAGNOSIS — S91.302A UNSPECIFIED OPEN WOUND, LEFT FOOT, INITIAL ENCOUNTER: ICD-10-CM

## 2019-02-28 DIAGNOSIS — C43.72 MALIGNANT MELANOMA OF LEFT LOWER EXTREMITY INCLUDING HIP (HCC): Primary | ICD-10-CM

## 2019-02-28 DIAGNOSIS — C43.72 MALIGNANT MELANOMA OF LEFT FOOT (HCC): Primary | ICD-10-CM

## 2019-02-28 PROCEDURE — G0463 HOSPITAL OUTPT CLINIC VISIT: HCPCS | Performed by: PLASTIC SURGERY

## 2019-02-28 PROCEDURE — 99203 OFFICE O/P NEW LOW 30 MIN: CPT | Performed by: PLASTIC SURGERY

## 2019-02-28 RX ORDER — HYDROCODONE BITARTRATE AND ACETAMINOPHEN 7.5; 325 MG/1; MG/1
TABLET ORAL
Qty: 25 TABLET | Refills: 0 | Status: SHIPPED | OUTPATIENT
Start: 2019-02-28 | End: 2019-03-28

## 2019-02-28 NOTE — PROGRESS NOTES
Pt request for surgery signed by pt and witnessed and signed by RN. Prescription for norco and narcotic hand-out instruction sheet given to and reviewed w/patient. Pre-Surgical Instruction Handout, reviewed w/pt.   All questions and concerns answered; pt

## 2019-02-28 NOTE — H&P
Cori Spears is a 77year old male that presents with Patient presents with:  Lesion: left foot melanoma  .     REFERRED BY:  Marcella Pardo    Pacemaker: YES  Latex Allergy: no  Coumadin: No  Plavix: No  Other anticoagulants: No  Cardiac stents: YES    H MG Oral Tablet 24 Hr Take 1.5 tablets (150 mg total) by mouth daily. Pt takes 1 1/2 tablet daily. Disp: 45 tablet Rfl: 2   tamsulosin HCl 0.4 MG Oral Cap Take 1 capsule (0.4 mg total) by mouth daily.  Disp: 90 capsule Rfl: 1   nitroGLYCERIN 0.4 MG [employer] resource strain: Not on file      Food insecurity:        Worry: Not on file        Inability: Not on file      Transportation needs:        Medical: Not on file        Non-medical: Not on file    Tobacco Use      Smoking status: Never Smoker Normocephalic  EYES: Conjunctiva - Right: Normal, Left: Normal; EOMI  EARS: Inspection - Right: Normal, Left: Normal  NECK/THYROID: Inspection - Normal, Palpation - Normal, Thyroid gland - Normal, No adenopathy  RESPIRATORY: Inspection - Normal, Effort - N

## 2019-02-28 NOTE — TELEPHONE ENCOUNTER
MD Gerry Espinal RN; Char Vogel RN; Alexis Dumas LPN             Let's coordinate time with Dr. Cheng Renae - on a Tuesday or Friday pm at [address] [place] [address].  Pt should have ok from cardiology for procedure and ideally off ASA for 5 d preop.  Derm eval preo

## 2019-02-28 NOTE — H&P (VIEW-ONLY)
Yvonne Ornelas is a 77year old male that presents with Patient presents with:  Lesion: left foot melanoma  .     REFERRED BY:  Mendoza Quezada    Pacemaker: YES  Latex Allergy: no  Coumadin: No  Plavix: No  Other anticoagulants: No  Cardiac stents: YES    H MG Oral Tablet 24 Hr Take 1.5 tablets (150 mg total) by mouth daily. Pt takes 1 1/2 tablet daily. Disp: 45 tablet Rfl: 2   tamsulosin HCl 0.4 MG Oral Cap Take 1 capsule (0.4 mg total) by mouth daily.  Disp: 90 capsule Rfl: 1   nitroGLYCERIN 0.4 MG [employer] resource strain: Not on file      Food insecurity:        Worry: Not on file        Inability: Not on file      Transportation needs:        Medical: Not on file        Non-medical: Not on file    Tobacco Use      Smoking status: Never Smoker Normocephalic  EYES: Conjunctiva - Right: Normal, Left: Normal; EOMI  EARS: Inspection - Right: Normal, Left: Normal  NECK/THYROID: Inspection - Normal, Palpation - Normal, Thyroid gland - Normal, No adenopathy  RESPIRATORY: Inspection - Normal, Effort - N

## 2019-02-28 NOTE — TELEPHONE ENCOUNTER
LMTCB regarding coordinated surgery with Dr. Jerrell Toledo    (03/08/2019 or 03/12/2019 depending on Cardiology clearance and recommendations for discontinuing ASA 81mg).

## 2019-03-01 VITALS
HEIGHT: 74 IN | WEIGHT: 240 LBS | SYSTOLIC BLOOD PRESSURE: 126 MMHG | OXYGEN SATURATION: 98 % | BODY MASS INDEX: 30.8 KG/M2 | DIASTOLIC BLOOD PRESSURE: 68 MMHG | HEART RATE: 55 BPM

## 2019-03-01 VITALS
HEART RATE: 60 BPM | SYSTOLIC BLOOD PRESSURE: 104 MMHG | BODY MASS INDEX: 31.44 KG/M2 | DIASTOLIC BLOOD PRESSURE: 62 MMHG | HEIGHT: 74 IN | RESPIRATION RATE: 18 BRPM | WEIGHT: 245 LBS | OXYGEN SATURATION: 97 %

## 2019-03-01 VITALS
WEIGHT: 246 LBS | RESPIRATION RATE: 16 BRPM | HEART RATE: 58 BPM | SYSTOLIC BLOOD PRESSURE: 116 MMHG | DIASTOLIC BLOOD PRESSURE: 60 MMHG

## 2019-03-01 NOTE — TELEPHONE ENCOUNTER
Contacted patient. Offered 03/08/2019 at [facility] with Dr. Shashi Naidu and Dr. Beth Wooten. Patient accepted and is aware that he requires cardiac clearance prior to surgery. Patient has an appt with Dr. Kayode Xavier on 03/04/2019 at 1430.  He is aware we are requesting f

## 2019-03-03 NOTE — PROGRESS NOTES
Bishop Hunt is a 77year old male. HPI:     CC:  Patient presents with:  Melanoma: New pt presenting for f/u with melanaoma to L plantar heel bx was preformed on 11/29/2018. c/o 11-12/10 at times \"sharp shooting pain\".          Allergies:  Latex Oral Tab CR TAKE 1 TABLET BY MOUTH EVERY DAY Disp: 90 tablet Rfl: 0   SIMVASTATIN 80 MG Oral Tab TAKE 1 TABLET(80 MG) BY MOUTH EVERY NIGHT Disp: 90 tablet Rfl: 0   Metoprolol Succinate  MG Oral Tablet 24 Hr Take 1.5 tablets (150 mg total) by mouth da • COLONOSCOPY N/A 2/24/2018    Performed by Matthew Russo MD at [address] [place] [address] ENDOSCOPY   • COLONOSCOPY N/A 2/23/2018    Performed by Tabatha Garcia MD at [address] [place] [address] ENDOSCOPY   • PACEMAKER/DEFIBRILLATOR  2013   • REMOVAL OF KIDNEY STONE  1974   • [facility]: Not Asked        Past Sunlamp Treatments for Acne: Not Asked        Hx of Spending [place] of Time in Sun: Not Asked        Bad sunburns in the past: Not Asked        Tanning Salons in the Past: Not Asked        Hx of Radiation Treatments: Not also:    Assessment / plan:    No orders of the defined types were placed in this encounter.       Meds & Refills for this Visit:  Requested Prescriptions      No prescriptions requested or ordered in this encounter         Melanoma of plantar aspect of ogla from errors in recognition.

## 2019-03-04 ENCOUNTER — TELEPHONE (OUTPATIENT)
Dept: SURGERY | Facility: CLINIC | Age: 69
End: 2019-03-04

## 2019-03-04 ENCOUNTER — PRIOR ORIGINAL RECORDS (OUTPATIENT)
Dept: OTHER | Age: 69
End: 2019-03-04

## 2019-03-04 DIAGNOSIS — S91.302A UNSPECIFIED OPEN WOUND, LEFT FOOT, INITIAL ENCOUNTER: Primary | ICD-10-CM

## 2019-03-04 NOTE — TELEPHONE ENCOUNTER
Pt Surgery/Procedure:  Wide excision left foor melanoma, SLNB  Pt insurance/number to contact: [id number] ID# and group: [id number]  Dx: U45.72  CPT: 65775, 08931, [id number]  Surgeon: Ruy Hernandez  Procedure scheduled where: [address] [place] [address]  Procedure sche

## 2019-03-05 ENCOUNTER — MED REC SCAN ONLY (OUTPATIENT)
Dept: DERMATOLOGY CLINIC | Facility: CLINIC | Age: 69
End: 2019-03-05

## 2019-03-05 DIAGNOSIS — N40.0 BENIGN PROSTATIC HYPERPLASIA, UNSPECIFIED WHETHER LOWER URINARY TRACT SYMPTOMS PRESENT: ICD-10-CM

## 2019-03-05 NOTE — TELEPHONE ENCOUNTER
Spoke with Benedicto Capps, she states Dr. Thi Floyd did give clearance for surgery, available in 03/04/2019 office visit notes. Ashish All our fax number to have note Yi Gunn. She states she will fax note.

## 2019-03-05 NOTE — TELEPHONE ENCOUNTER
Fax received with cardiac clearance. Given to Dr. Khanh Sheriff for his review. Copy scanned into Epic.

## 2019-03-05 NOTE — TELEPHONE ENCOUNTER
Spoke to Haja  @  [facility] (call ref# [id number])    Individual Deduct: does not apply   Individual OOP: $ 3,900.00 with $ 165.61 met  $295.00 co-pay       Pre-Auth required: No  Pre- determination required: No

## 2019-03-06 ENCOUNTER — TELEPHONE (OUTPATIENT)
Dept: SURGERY | Facility: CLINIC | Age: 69
End: 2019-03-06

## 2019-03-06 NOTE — TELEPHONE ENCOUNTER
Surgery form that was sent sent to surg dept does not state - Lymphoscintigraphy     pls re send updated form - pts surgery is on 3/8

## 2019-03-06 NOTE — TELEPHONE ENCOUNTER
No Protocol on this med.      Requested Prescriptions     Pending Prescriptions Disp Refills   • TAMSULOSIN HCL 0.4 MG Oral Cap [Pharmacy Med Name: TAMSULOSIN 0.4MG CAPSULES] 90 capsule 0     Sig: TAKE 1 CAPSULE(0.4 MG) BY MOUTH DAILY       Last Office Visi

## 2019-03-06 NOTE — TELEPHONE ENCOUNTER
Spoke with Jaquan Mckinney, informed her the paper form faxed to surgery department states lymphoscintigraphy x2-she states she did not receive this.     Faxed as requested to [phone]. [phone]: Received fax confirmation receipt, status \"success\" from 173.25

## 2019-03-06 NOTE — TELEPHONE ENCOUNTER
Pt is coming in for [phone], on 3/8/19. Please call [facility] for prior auth.

## 2019-03-06 NOTE — TELEPHONE ENCOUNTER
Prior auth to be obtained via [facility]. Spoke with Dylan Galicia, reference [id number]    Authorization for CPT 04631 obtained, authorization [id number] from 03/06/2019-04/20/2019, entered in referral.    TYLER on Fatoumata's VM with above info.

## 2019-03-07 VITALS
SYSTOLIC BLOOD PRESSURE: 122 MMHG | HEIGHT: 74 IN | HEART RATE: 60 BPM | BODY MASS INDEX: 31.7 KG/M2 | DIASTOLIC BLOOD PRESSURE: 68 MMHG | WEIGHT: 247 LBS

## 2019-03-07 RX ORDER — TAMSULOSIN HYDROCHLORIDE 0.4 MG/1
CAPSULE ORAL
Qty: 90 CAPSULE | Refills: 0 | Status: ON HOLD | OUTPATIENT
Start: 2019-03-07 | End: 2019-03-08

## 2019-03-08 ENCOUNTER — ANESTHESIA (OUTPATIENT)
Dept: SURGERY | Facility: HOSPITAL | Age: 69
End: 2019-03-08
Payer: MEDICARE

## 2019-03-08 ENCOUNTER — ANESTHESIA EVENT (OUTPATIENT)
Dept: SURGERY | Facility: HOSPITAL | Age: 69
End: 2019-03-08
Payer: MEDICARE

## 2019-03-08 ENCOUNTER — HOSPITAL ENCOUNTER (OUTPATIENT)
Facility: HOSPITAL | Age: 69
Setting detail: OBSERVATION
Discharge: HOME | End: 2019-03-09
Attending: SURGERY | Admitting: SURGERY
Payer: MEDICARE

## 2019-03-08 ENCOUNTER — HOSPITAL ENCOUNTER (OUTPATIENT)
Dept: NUCLEAR MEDICINE | Facility: HOSPITAL | Age: 69
Discharge: HOME | End: 2019-03-08
Attending: SURGERY
Payer: MEDICARE

## 2019-03-08 ENCOUNTER — HOSPITAL DOCUMENTATION (OUTPATIENT)
Dept: SURGERY | Facility: CLINIC | Age: 69
End: 2019-03-08

## 2019-03-08 DIAGNOSIS — S91.302A UNSPECIFIED OPEN WOUND, LEFT FOOT, INITIAL ENCOUNTER: Primary | ICD-10-CM

## 2019-03-08 DIAGNOSIS — C43.72 MELANOMA OF FOOT, LEFT (HCC): ICD-10-CM

## 2019-03-08 DIAGNOSIS — C43.72 MALIGNANT MELANOMA OF LEFT FOOT (HCC): Primary | ICD-10-CM

## 2019-03-08 PROCEDURE — 0HRKX74 REPLACEMENT OF RIGHT LOWER LEG SKIN WITH AUTOLOGOUS TISSUE SUBSTITUTE, PARTIAL THICKNESS, EXTERNAL APPROACH: ICD-10-PCS | Performed by: PLASTIC SURGERY

## 2019-03-08 PROCEDURE — 07BJ3ZX EXCISION OF LEFT INGUINAL LYMPHATIC, PERCUTANEOUS APPROACH, DIAGNOSTIC: ICD-10-PCS | Performed by: SURGERY

## 2019-03-08 PROCEDURE — 0HBNXZZ EXCISION OF LEFT FOOT SKIN, EXTERNAL APPROACH: ICD-10-PCS | Performed by: SURGERY

## 2019-03-08 PROCEDURE — 11626 EXC S/N/H/F/G MAL+MRG >4 CM: CPT | Performed by: SURGERY

## 2019-03-08 PROCEDURE — 0HXNXZZ TRANSFER LEFT FOOT SKIN, EXTERNAL APPROACH: ICD-10-PCS | Performed by: SURGERY

## 2019-03-08 PROCEDURE — 07BG3ZX EXCISION OF LEFT LOWER EXTREMITY LYMPHATIC, PERCUTANEOUS APPROACH, DIAGNOSTIC: ICD-10-PCS | Performed by: SURGERY

## 2019-03-08 PROCEDURE — 15120 SPLT AGRFT F/S/N/H/F/G/M 1ST: CPT | Performed by: PLASTIC SURGERY

## 2019-03-08 PROCEDURE — 78195 LYMPH SYSTEM IMAGING: CPT | Performed by: SURGERY

## 2019-03-08 PROCEDURE — 38900 IO MAP OF SENT LYMPH NODE: CPT | Performed by: SURGERY

## 2019-03-08 PROCEDURE — 38531 OPEN BX/EXC INGUINOFEM NODES: CPT | Performed by: PLASTIC SURGERY

## 2019-03-08 PROCEDURE — 38531 OPEN BX/EXC INGUINOFEM NODES: CPT | Performed by: SURGERY

## 2019-03-08 PROCEDURE — 14040 TIS TRNFR F/C/C/M/N/A/G/H/F: CPT | Performed by: PLASTIC SURGERY

## 2019-03-08 RX ORDER — ACETAMINOPHEN 500 MG
1000 TABLET ORAL ONCE
Status: COMPLETED | OUTPATIENT
Start: 2019-03-08 | End: 2019-03-08

## 2019-03-08 RX ORDER — ONDANSETRON 2 MG/ML
4 INJECTION INTRAMUSCULAR; INTRAVENOUS EVERY 6 HOURS PRN
Status: DISCONTINUED | OUTPATIENT
Start: 2019-03-08 | End: 2019-03-09

## 2019-03-08 RX ORDER — ONDANSETRON 2 MG/ML
4 INJECTION INTRAMUSCULAR; INTRAVENOUS ONCE AS NEEDED
Status: COMPLETED | OUTPATIENT
Start: 2019-03-08 | End: 2019-03-08

## 2019-03-08 RX ORDER — HYDROCODONE BITARTRATE AND ACETAMINOPHEN 7.5; 325 MG/1; MG/1
1 TABLET ORAL EVERY 4 HOURS PRN
Status: DISCONTINUED | OUTPATIENT
Start: 2019-03-08 | End: 2019-03-09

## 2019-03-08 RX ORDER — ONDANSETRON 2 MG/ML
INJECTION INTRAMUSCULAR; INTRAVENOUS AS NEEDED
Status: DISCONTINUED | OUTPATIENT
Start: 2019-03-08 | End: 2019-03-08 | Stop reason: SURG

## 2019-03-08 RX ORDER — MORPHINE SULFATE 4 MG/ML
2 INJECTION, SOLUTION INTRAMUSCULAR; INTRAVENOUS EVERY 10 MIN PRN
Status: DISCONTINUED | OUTPATIENT
Start: 2019-03-08 | End: 2019-03-08 | Stop reason: HOSPADM

## 2019-03-08 RX ORDER — ISOSULFAN BLUE 50 MG/5ML
INJECTION, SOLUTION SUBCUTANEOUS AS NEEDED
Status: DISCONTINUED | OUTPATIENT
Start: 2019-03-08 | End: 2019-03-08 | Stop reason: HOSPADM

## 2019-03-08 RX ORDER — MORPHINE SULFATE 4 MG/ML
4 INJECTION, SOLUTION INTRAMUSCULAR; INTRAVENOUS EVERY 10 MIN PRN
Status: DISCONTINUED | OUTPATIENT
Start: 2019-03-08 | End: 2019-03-08 | Stop reason: HOSPADM

## 2019-03-08 RX ORDER — METOCLOPRAMIDE 10 MG/1
10 TABLET ORAL ONCE
Status: COMPLETED | OUTPATIENT
Start: 2019-03-08 | End: 2019-03-08

## 2019-03-08 RX ORDER — HYDROCODONE BITARTRATE AND ACETAMINOPHEN 5; 325 MG/1; MG/1
1 TABLET ORAL EVERY 6 HOURS PRN
Status: DISCONTINUED | OUTPATIENT
Start: 2019-03-08 | End: 2019-03-09

## 2019-03-08 RX ORDER — PANTOPRAZOLE SODIUM 40 MG/1
40 TABLET, DELAYED RELEASE ORAL
Status: DISCONTINUED | OUTPATIENT
Start: 2019-03-09 | End: 2019-03-09

## 2019-03-08 RX ORDER — SODIUM CHLORIDE, SODIUM LACTATE, POTASSIUM CHLORIDE, CALCIUM CHLORIDE 600; 310; 30; 20 MG/100ML; MG/100ML; MG/100ML; MG/100ML
INJECTION, SOLUTION INTRAVENOUS CONTINUOUS
Status: DISCONTINUED | OUTPATIENT
Start: 2019-03-08 | End: 2019-03-08

## 2019-03-08 RX ORDER — NEOSTIGMINE METHYLSULFATE 0.5 MG/ML
INJECTION INTRAVENOUS AS NEEDED
Status: DISCONTINUED | OUTPATIENT
Start: 2019-03-08 | End: 2019-03-08 | Stop reason: SURG

## 2019-03-08 RX ORDER — LOSARTAN POTASSIUM 100 MG/1
100 TABLET ORAL DAILY
Status: DISCONTINUED | OUTPATIENT
Start: 2019-03-09 | End: 2019-03-09

## 2019-03-08 RX ORDER — HALOPERIDOL 5 MG/ML
0.25 INJECTION INTRAMUSCULAR ONCE AS NEEDED
Status: DISCONTINUED | OUTPATIENT
Start: 2019-03-08 | End: 2019-03-08 | Stop reason: HOSPADM

## 2019-03-08 RX ORDER — HYDROCODONE BITARTRATE AND ACETAMINOPHEN 5; 325 MG/1; MG/1
1 TABLET ORAL AS NEEDED
Status: COMPLETED | OUTPATIENT
Start: 2019-03-08 | End: 2019-03-08

## 2019-03-08 RX ORDER — FAMOTIDINE 20 MG/1
20 TABLET ORAL ONCE
Status: DISCONTINUED | OUTPATIENT
Start: 2019-03-08 | End: 2019-03-08 | Stop reason: HOSPADM

## 2019-03-08 RX ORDER — GLYCOPYRROLATE 0.2 MG/ML
INJECTION INTRAMUSCULAR; INTRAVENOUS AS NEEDED
Status: DISCONTINUED | OUTPATIENT
Start: 2019-03-08 | End: 2019-03-08 | Stop reason: SURG

## 2019-03-08 RX ORDER — METOPROLOL TARTRATE 5 MG/5ML
2.5 INJECTION INTRAVENOUS ONCE
Status: DISCONTINUED | OUTPATIENT
Start: 2019-03-08 | End: 2019-03-08 | Stop reason: HOSPADM

## 2019-03-08 RX ORDER — EPHEDRINE SULFATE 50 MG/ML
INJECTION, SOLUTION INTRAVENOUS AS NEEDED
Status: DISCONTINUED | OUTPATIENT
Start: 2019-03-08 | End: 2019-03-08 | Stop reason: SURG

## 2019-03-08 RX ORDER — NALOXONE HYDROCHLORIDE 0.4 MG/ML
80 INJECTION, SOLUTION INTRAMUSCULAR; INTRAVENOUS; SUBCUTANEOUS AS NEEDED
Status: ACTIVE | OUTPATIENT
Start: 2019-03-08 | End: 2019-03-08

## 2019-03-08 RX ORDER — MORPHINE SULFATE 10 MG/ML
6 INJECTION, SOLUTION INTRAMUSCULAR; INTRAVENOUS EVERY 10 MIN PRN
Status: DISCONTINUED | OUTPATIENT
Start: 2019-03-08 | End: 2019-03-08 | Stop reason: HOSPADM

## 2019-03-08 RX ORDER — SODIUM CHLORIDE, SODIUM LACTATE, POTASSIUM CHLORIDE, CALCIUM CHLORIDE 600; 310; 30; 20 MG/100ML; MG/100ML; MG/100ML; MG/100ML
INJECTION, SOLUTION INTRAVENOUS CONTINUOUS
Status: DISCONTINUED | OUTPATIENT
Start: 2019-03-08 | End: 2019-03-09

## 2019-03-08 RX ORDER — DEXAMETHASONE SODIUM PHOSPHATE 4 MG/ML
VIAL (ML) INJECTION AS NEEDED
Status: DISCONTINUED | OUTPATIENT
Start: 2019-03-08 | End: 2019-03-08 | Stop reason: SURG

## 2019-03-08 RX ORDER — ROCURONIUM BROMIDE 10 MG/ML
INJECTION, SOLUTION INTRAVENOUS AS NEEDED
Status: DISCONTINUED | OUTPATIENT
Start: 2019-03-08 | End: 2019-03-08 | Stop reason: SURG

## 2019-03-08 RX ORDER — HYDROCODONE BITARTRATE AND ACETAMINOPHEN 5; 325 MG/1; MG/1
2 TABLET ORAL EVERY 6 HOURS PRN
Status: DISCONTINUED | OUTPATIENT
Start: 2019-03-08 | End: 2019-03-09

## 2019-03-08 RX ORDER — CEFAZOLIN SODIUM/WATER 2 G/20 ML
2 SYRINGE (ML) INTRAVENOUS ONCE
Status: CANCELLED | OUTPATIENT
Start: 2019-03-08

## 2019-03-08 RX ORDER — MINERAL OIL
OIL (ML) MISCELLANEOUS AS NEEDED
Status: DISCONTINUED | OUTPATIENT
Start: 2019-03-08 | End: 2019-03-08 | Stop reason: HOSPADM

## 2019-03-08 RX ORDER — HYDROCODONE BITARTRATE AND ACETAMINOPHEN 5; 325 MG/1; MG/1
2 TABLET ORAL AS NEEDED
Status: COMPLETED | OUTPATIENT
Start: 2019-03-08 | End: 2019-03-08

## 2019-03-08 RX ADMIN — ROCURONIUM BROMIDE 10 MG: 10 INJECTION, SOLUTION INTRAVENOUS at 16:08:00

## 2019-03-08 RX ADMIN — ROCURONIUM BROMIDE 10 MG: 10 INJECTION, SOLUTION INTRAVENOUS at 15:00:00

## 2019-03-08 RX ADMIN — ROCURONIUM BROMIDE 20 MG: 10 INJECTION, SOLUTION INTRAVENOUS at 14:10:00

## 2019-03-08 RX ADMIN — ROCURONIUM BROMIDE 10 MG: 10 INJECTION, SOLUTION INTRAVENOUS at 15:24:00

## 2019-03-08 RX ADMIN — SODIUM CHLORIDE, SODIUM LACTATE, POTASSIUM CHLORIDE, CALCIUM CHLORIDE: 600; 310; 30; 20 INJECTION, SOLUTION INTRAVENOUS at 16:39:00

## 2019-03-08 RX ADMIN — ROCURONIUM BROMIDE 10 MG: 10 INJECTION, SOLUTION INTRAVENOUS at 14:30:00

## 2019-03-08 RX ADMIN — GLYCOPYRROLATE 0.8 MG: 0.2 INJECTION INTRAMUSCULAR; INTRAVENOUS at 17:44:00

## 2019-03-08 RX ADMIN — SODIUM CHLORIDE, SODIUM LACTATE, POTASSIUM CHLORIDE, CALCIUM CHLORIDE: 600; 310; 30; 20 INJECTION, SOLUTION INTRAVENOUS at 13:58:00

## 2019-03-08 RX ADMIN — EPHEDRINE SULFATE 5 MG: 50 INJECTION, SOLUTION INTRAVENOUS at 14:23:00

## 2019-03-08 RX ADMIN — DEXAMETHASONE SODIUM PHOSPHATE 4 MG: 4 MG/ML VIAL (ML) INJECTION at 16:53:00

## 2019-03-08 RX ADMIN — NEOSTIGMINE METHYLSULFATE 5 MG: 0.5 INJECTION INTRAVENOUS at 17:44:00

## 2019-03-08 RX ADMIN — ONDANSETRON 4 MG: 2 INJECTION INTRAMUSCULAR; INTRAVENOUS at 16:53:00

## 2019-03-08 RX ADMIN — EPHEDRINE SULFATE 5 MG: 50 INJECTION, SOLUTION INTRAVENOUS at 15:00:00

## 2019-03-08 NOTE — ANESTHESIA PROCEDURE NOTES
Airway  Urgency: elective    Airway not difficult    General Information and Staff    Patient location during procedure: OR  Anesthesiologist: Maureen Metz MD  Performed: anesthesiologist     Indications and Patient Condition  Indications for airway kristofer

## 2019-03-08 NOTE — ANESTHESIA PREPROCEDURE EVALUATION
Anesthesia PreOp Note    HPI:     Chet Max is a 77year old male who presents for preoperative consultation requested by: Heladio Carballo MD    Date of Surgery: 3/8/2019    Procedure(s):  EXCISION OF MELANOMA  SENTINEL LYMPH NODE BIOPSY  SKIN GRAFT • ANGIOPLASTY (CORONARY)  2004   • BIOPSY OF SKIN LESION Left 11/29/2018    foot - heel, Dr. Kevin Denney   • Martinez Jenna  11/2013   • COLONOSCOPY N/A 4/26/2018    Performed by Solo Estrella MD at [facility] ENDOSCOPY   • COLONOSCOPY N/A 2/24/20 nitroGLYCERIN 0.4 MG Sublingual SL Tab Place 1 tablet (0.4 mg total) under the tongue every 5 (five) minutes as needed for Chest pain.  Disp: 100 tablet Rfl: 0 More than a month at Unknown time       Current Facility-Administered Medications Ordered in [employer] fentaNYL citrate (SUBLIMAZE) 0.05 MG/ML injection  Intravenous PRN Jose Ontiveros MD 50 mcg at 03/08/19 1410   Rocuronium Bromide (ZEMURON) injection  Intravenous PRN Jose Ontiveros MD 20 mg at 03/08/19 1410   propofol (DIPRIVAN) injection  Intravenous PRN Physically abused: Not on file        Forced sexual activity: Not on file    Other Topics      Concerns:        Grew up on a farm: Not Asked        History of tanning: Not Asked        Outdoor occupation: Not Asked        Reaction to local anesthetic Denies CP, endorsed SOB with exertion    Neuro/Psych      GI/Hepatic/Renal    (+) GERD,     Endo/Other    Abdominal              Anesthesia Plan:   ASA:  4  Plan:   General  Monitors and Lines:   A-line  Airway:  ETT  Post-op Pain Management: IV analgesics

## 2019-03-08 NOTE — ANESTHESIA PROCEDURE NOTES
Arterial Line  Performed by: Fernando Serna MD  Authorized by: Fernando Serna MD     Site Identification: surface landmarks    Patient Location:  OR  Indication: continuous blood pressure monitoring and blood sampling needed    Anesthesiologist:  Jose Miller

## 2019-03-08 NOTE — INTERVAL H&P NOTE
Pre-op Diagnosis: Malignant melanoma of left foot ([facility] [address] [C43.72]    The above referenced H&P was reviewed by Lamberto Rizvi MD on 3/8/2019, the patient was examined and no significant changes have occurred in the patient's condition since the H&P was perfor

## 2019-03-08 NOTE — INTERVAL H&P NOTE
Pre-op Diagnosis: Malignant melanoma of left foot ([facility] [address] [C43.72]    The above referenced H&P was reviewed by Geo Gipson MD on 3/8/2019, the patient was examined and no significant changes have occurred in the patient's condition since the H&P w

## 2019-03-08 NOTE — OR PREOP
Latex was listed in patient's allergies. Patient states he has never been allergic to latex and has never had any sensitivities to adhesives, rubber gloves, etc. Latex removed from allergies.

## 2019-03-08 NOTE — INTERVAL H&P NOTE
Pre-op Diagnosis: Malignant melanoma of left foot ([facility] [address] [C43.72]    The above referenced H&P was reviewed by Lyle Cervantes MD on 3/8/2019, the patient was examined and no significant changes have occurred in the patient's condition since the H&P was perfor

## 2019-03-09 ENCOUNTER — TELEPHONE (OUTPATIENT)
Dept: SURGERY | Facility: CLINIC | Age: 69
End: 2019-03-09

## 2019-03-09 VITALS
BODY MASS INDEX: 32.23 KG/M2 | SYSTOLIC BLOOD PRESSURE: 116 MMHG | RESPIRATION RATE: 18 BRPM | OXYGEN SATURATION: 99 % | DIASTOLIC BLOOD PRESSURE: 58 MMHG | HEART RATE: 57 BPM | WEIGHT: 251.13 LBS | HEIGHT: 74 IN | TEMPERATURE: 98 F

## 2019-03-09 RX ORDER — 0.9 % SODIUM CHLORIDE 0.9 %
VIAL (ML) INJECTION
Status: COMPLETED
Start: 2019-03-09 | End: 2019-03-09

## 2019-03-09 NOTE — BRIEF OP NOTE
Pre-Operative Diagnosis: Malignant melanoma of left foot ([facility]) [C43.72]     Post-Operative Diagnosis: Malignant melanoma of left foot [facility]) [C43.72]      Procedure Performed:   Procedure(s):   Wide excision left foot melanoma, sentinel lymph node biopsy Isabella Arce

## 2019-03-09 NOTE — PHYSICAL THERAPY NOTE
PHYSICAL THERAPY EVALUATION - INPATIENT    Room Number: 443/443-A  Evaluation Date: 3/9/2019  Presenting Problem: left foot melanoma  Physician Order: PT Eval and Treat    Problem List  Active Problems:    Observation for unspecified suspected conditi home    OBJECTIVE  Precautions: None  Fall Risk: Standard fall risk    WEIGHT BEARING RESTRICTION  Weight Bearing Restriction: L lower extremity           L Lower Extremity: Non-Weight Bearing    PAIN ASSESSMENT  Ratin  Location: L foot  Management [place] Adjusted handles on crutches to improve triceps efficiency for gait. All needs left within reach. RN aware. Patient End of Session: In bed;Needs met;Call light within reach;RN aware of session/findings; All patient questions and concerns addressed; Ala

## 2019-03-09 NOTE — OPERATIVE REPORT
[facility]    PATIENT'S NAME: hJ Zavala   ATTENDING PHYSICIAN: Keeley Rondon MD   OPERATING PHYSICIAN: Win Roberts MD   PATIENT ACCOUNT#:   [id number]    LOCATION:  [address] [place] [address] #:   [id number]       DATE OF we incised laterally and rotated a laterally-based skin flap to fill in a portion of the defect and provide more bulky soft tissue. This left us with a 50 x 40 mm soft tissue defect.       The split-thickness skin graft was laid in place and cut to the courtney

## 2019-03-09 NOTE — DISCHARGE SUMMARY
[facility]    Discharge Summary    Yvonne Ornelas Patient Status:  Observation    1950 MRN [id number]   Location [facility] 4W/SW/SE Attending Sandrita Blackman MD   Hosp Day # 0 PCP Harmony David DO     Date of Admiss DAILY    POTASSIUM CHLORIDE ER 10 MEQ Oral Tab CR  TAKE 1 TABLET BY MOUTH EVERY DAY    SIMVASTATIN 80 MG Oral Tab  TAKE 1 TABLET(80 MG) BY MOUTH EVERY NIGHT    Metoprolol Succinate  MG Oral Tablet 24 Hr  Take 1.5 tablets (150 mg total) by mouth daily asleep!). The sore throat should get better within 48 hours. You can gargle with warm salt water (1/2 tsp in 4 oz warm water) or use a throat lozenge for comfort  · To feel muscle aches or soreness especially in the abdomen, chest or neck.  The achy feeling

## 2019-03-09 NOTE — OPERATIVE REPORT
[facility]    PATIENT'S NAME: Mckenzie Mary   ATTENDING PHYSICIAN: Adair Champion MD   OPERATING PHYSICIAN: Adair Champion MD   PATIENT ACCOUNT#:   [id number]    LOCATION:  [facility] PACU 1 [facility] 10  MEDICAL RECORD #:   [id number]       DATE cm, and there was some asymmetric cortical thickening in a few lymph nodes, felt to be of a normal variation. Normal-sized lymph nodes were noted in the right inguinal region for comparison. The patient presents now for surgical treatment and staging. of 1198 and an ex vivo count of 2913. The fourth sentinel lymph node had an in vivo count of 1198 and an ex vivo count of 1361. Dorothy lymph node #5 had an in vivo count of 510 and an ex vivo count of 383.   Each of these specimens was sent for [facility] to achieve 2 cm margins. Sharp dissection was then used to make skin incision, and this was extended sharply, maintaining a perpendicular orientation to the skin down to the fat pad and eventually the plantar fascia.   Electrocautery was used for [employer]

## 2019-03-09 NOTE — ANESTHESIA POSTPROCEDURE EVALUATION
Patient: Corey Lee    Procedure Summary     Date:  03/08/19 Room / Location:  [address] [place] [address] OR 01 / 300 [facility] OR    Anesthesia Start:  5704 Anesthesia Stop:  3638    Procedures:       EXCISION OF MELANOMA (Left Foot)      SENTINEL LYMPH NODE BIOPSY (Left

## 2019-03-10 NOTE — TELEPHONE ENCOUNTER
Spoke with pt arrived home from observation stay post op late this afternoon. Seen by Dr. Cesia Mijares today before discharge.   Complains of intermittent sharp pain to ball of left foot, when has pain rates 8/10, taking norco 7.5mg as prescribed every 4-6 hours

## 2019-03-11 ENCOUNTER — TELEPHONE (OUTPATIENT)
Dept: CARDIOLOGY | Age: 69
End: 2019-03-11

## 2019-03-11 ENCOUNTER — PATIENT OUTREACH (OUTPATIENT)
Dept: CASE MANAGEMENT | Age: 69
End: 2019-03-11

## 2019-03-11 NOTE — PROGRESS NOTES
Tried to call the pt for condition update, phone rang three times and then disconnected. Tried to call back and no answer. NCM to FU.

## 2019-03-13 ENCOUNTER — MA CHART PREP (OUTPATIENT)
Dept: FAMILY MEDICINE CLINIC | Facility: CLINIC | Age: 69
End: 2019-03-13

## 2019-03-14 ENCOUNTER — OFFICE VISIT (OUTPATIENT)
Dept: FAMILY MEDICINE CLINIC | Facility: CLINIC | Age: 69
End: 2019-03-14
Payer: COMMERCIAL

## 2019-03-14 ENCOUNTER — LAB ENCOUNTER (OUTPATIENT)
Dept: LAB | Age: 69
End: 2019-03-14
Attending: FAMILY MEDICINE
Payer: MEDICARE

## 2019-03-14 VITALS
HEIGHT: 74 IN | WEIGHT: 251 LBS | BODY MASS INDEX: 32.21 KG/M2 | RESPIRATION RATE: 17 BRPM | SYSTOLIC BLOOD PRESSURE: 110 MMHG | DIASTOLIC BLOOD PRESSURE: 70 MMHG | HEART RATE: 62 BPM

## 2019-03-14 DIAGNOSIS — I77.1 TORTUOUS AORTA (HCC): ICD-10-CM

## 2019-03-14 DIAGNOSIS — R97.20 INCREASED PROSTATE SPECIFIC ANTIGEN (PSA) VELOCITY: ICD-10-CM

## 2019-03-14 DIAGNOSIS — J44.9 ASTHMA WITH COPD (CHRONIC OBSTRUCTIVE PULMONARY DISEASE) (HCC): ICD-10-CM

## 2019-03-14 DIAGNOSIS — Z00.00 MEDICARE ANNUAL WELLNESS VISIT, INITIAL: ICD-10-CM

## 2019-03-14 DIAGNOSIS — R97.20 ELEVATED PSA, BETWEEN 10 AND LESS THAN 20 NG/ML: ICD-10-CM

## 2019-03-14 DIAGNOSIS — C43.72 MELANOMA OF FOOT, LEFT (HCC): ICD-10-CM

## 2019-03-14 DIAGNOSIS — Z00.00 MEDICARE ANNUAL WELLNESS VISIT, INITIAL: Primary | ICD-10-CM

## 2019-03-14 LAB
ALBUMIN SERPL-MCNC: 3.6 G/DL (ref 3.4–5)
ALBUMIN/GLOB SERPL: 0.9 {RATIO} (ref 1–2)
ALP LIVER SERPL-CCNC: 82 U/L (ref 45–117)
ALT SERPL-CCNC: 24 U/L (ref 16–61)
ANION GAP SERPL CALC-SCNC: 7 MMOL/L (ref 0–18)
AST SERPL-CCNC: 18 U/L (ref 15–37)
BASOPHILS # BLD AUTO: 0.03 X10(3) UL (ref 0–0.2)
BASOPHILS NFR BLD AUTO: 0.4 %
BILIRUB SERPL-MCNC: 0.3 MG/DL (ref 0.1–2)
BILIRUB UR QL: NEGATIVE
BUN BLD-MCNC: 14 MG/DL (ref 7–18)
BUN/CREAT SERPL: 12.8 (ref 10–20)
CALCIUM BLD-MCNC: 8.6 MG/DL (ref 8.5–10.1)
CHLORIDE SERPL-SCNC: 109 MMOL/L (ref 98–107)
CHOLEST SMN-MCNC: 103 MG/DL (ref ?–200)
CLARITY UR: CLEAR
CO2 SERPL-SCNC: 27 MMOL/L (ref 21–32)
COLOR UR: YELLOW
COMPLEXED PSA SERPL-MCNC: 17.2 NG/ML (ref ?–4)
CREAT BLD-MCNC: 1.09 MG/DL (ref 0.7–1.3)
DEPRECATED RDW RBC AUTO: 44.5 FL (ref 35.1–46.3)
EOSINOPHIL # BLD AUTO: 0.24 X10(3) UL (ref 0–0.7)
EOSINOPHIL NFR BLD AUTO: 3.2 %
ERYTHROCYTE [DISTWIDTH] IN BLOOD BY AUTOMATED COUNT: 14.1 % (ref 11–15)
GLOBULIN PLAS-MCNC: 4.1 G/DL (ref 2.8–4.4)
GLUCOSE BLD-MCNC: 73 MG/DL (ref 70–99)
GLUCOSE UR-MCNC: NEGATIVE MG/DL
HCT VFR BLD AUTO: 42.4 % (ref 39–53)
HDLC SERPL-MCNC: 34 MG/DL (ref 40–59)
HGB BLD-MCNC: 13.2 G/DL (ref 13–17.5)
HGB UR QL STRIP.AUTO: NEGATIVE
IMM GRANULOCYTES # BLD AUTO: 0.02 X10(3) UL (ref 0–1)
IMM GRANULOCYTES NFR BLD: 0.3 %
KETONES UR-MCNC: NEGATIVE MG/DL
LDLC SERPL CALC-MCNC: 19 MG/DL (ref ?–100)
LEUKOCYTE ESTERASE UR QL STRIP.AUTO: NEGATIVE
LYMPHOCYTES # BLD AUTO: 1.85 X10(3) UL (ref 1–4)
LYMPHOCYTES NFR BLD AUTO: 24.5 %
M PROTEIN MFR SERPL ELPH: 7.7 G/DL (ref 6.4–8.2)
MCH RBC QN AUTO: 26.8 PG (ref 26–34)
MCHC RBC AUTO-ENTMCNC: 31.1 G/DL (ref 31–37)
MCV RBC AUTO: 86.2 FL (ref 80–100)
MONOCYTES # BLD AUTO: 0.8 X10(3) UL (ref 0.1–1)
MONOCYTES NFR BLD AUTO: 10.6 %
NEUTROPHILS # BLD AUTO: 4.61 X10 (3) UL (ref 1.5–7.7)
NEUTROPHILS # BLD AUTO: 4.61 X10(3) UL (ref 1.5–7.7)
NEUTROPHILS NFR BLD AUTO: 61 %
NITRITE UR QL STRIP.AUTO: NEGATIVE
NONHDLC SERPL-MCNC: 69 MG/DL (ref ?–130)
OSMOLALITY SERPL CALC.SUM OF ELEC: 295 MOSM/KG (ref 275–295)
PH UR: 6 [PH] (ref 5–8)
PLATELET # BLD AUTO: 270 10(3)UL (ref 150–450)
POTASSIUM SERPL-SCNC: 4.2 MMOL/L (ref 3.5–5.1)
PROT UR-MCNC: NEGATIVE MG/DL
PSA FREE MFR SERPL: 8 %
PSA FREE SERPL-MCNC: 1.4 NG/ML
PSA SERPL-MCNC: 16.5 NG/ML (ref ?–4)
RBC # BLD AUTO: 4.92 X10(6)UL (ref 3.8–5.8)
SODIUM SERPL-SCNC: 143 MMOL/L (ref 136–145)
SP GR UR STRIP: 1.02 (ref 1–1.03)
TRIGL SERPL-MCNC: 248 MG/DL (ref 30–149)
TSI SER-ACNC: 1.26 MIU/ML (ref 0.36–3.74)
UROBILINOGEN UR STRIP-ACNC: <2
VIT C UR-MCNC: NEGATIVE MG/DL
VLDLC SERPL CALC-MCNC: 50 MG/DL (ref 0–30)
WBC # BLD AUTO: 7.6 X10(3) UL (ref 4–11)

## 2019-03-14 PROCEDURE — 85025 COMPLETE CBC W/AUTO DIFF WBC: CPT

## 2019-03-14 PROCEDURE — 96160 PT-FOCUSED HLTH RISK ASSMT: CPT | Performed by: FAMILY MEDICINE

## 2019-03-14 PROCEDURE — 84154 ASSAY OF PSA FREE: CPT

## 2019-03-14 PROCEDURE — 80061 LIPID PANEL: CPT

## 2019-03-14 PROCEDURE — 84153 ASSAY OF PSA TOTAL: CPT

## 2019-03-14 PROCEDURE — 36415 COLL VENOUS BLD VENIPUNCTURE: CPT

## 2019-03-14 PROCEDURE — G0439 PPPS, SUBSEQ VISIT: HCPCS | Performed by: FAMILY MEDICINE

## 2019-03-14 PROCEDURE — 81003 URINALYSIS AUTO W/O SCOPE: CPT

## 2019-03-14 PROCEDURE — 80053 COMPREHEN METABOLIC PANEL: CPT

## 2019-03-14 PROCEDURE — 99397 PER PM REEVAL EST PAT 65+ YR: CPT | Performed by: FAMILY MEDICINE

## 2019-03-14 PROCEDURE — 84443 ASSAY THYROID STIM HORMONE: CPT

## 2019-03-14 NOTE — PROGRESS NOTES
Several attempts made to reach the patient with no return call. Patient completed HFU on 3/14/2019. Closing encounter.

## 2019-03-14 NOTE — PATIENT INSTRUCTIONS
All adult screening ordered and done appropriate for patient's age and gender and risk factors and complaints. Medication reviewed and renewed where needed and appropriate. Monitor blood pressures and record at home. Limit salt intake.   Recommend weight

## 2019-03-14 NOTE — PROGRESS NOTES
HPI:    Patient ID: Batsheva Thomas is a 77year old male.     77year old AA male here for medicare annual physical and for status update on any confirmed chronic medical illnesses and follow up on any previous labs or procedures that were suggestive or i Rfl:        General Health                                                   Functional Ability                                                        Functional Status                                     Fall/Risk Assessment Pneumococcal Orders placed or performed in visit on 03/05/18   • PNEUMOCOCCAL IMM (PNEUMOVAX)    Update Immunization Activity if applicable    Hepatitis B No orders found for this or any previous visit.  Update Immunization Activity if applicable    Tetanus well-nourished. No distress. HENT:   Right Ear: Tympanic membrane and ear canal normal.   Left Ear: Tympanic membrane and ear canal normal.   Nose: Nose normal.   Mouth/Throat: Oropharynx is clear and moist.   Neck: No thyromegaly present.    Emilia Guardado (PLAY). To oncology. Return in about 3 weeks (around 4/4/2019), or if symptoms worsen or fail to improve.          QF#[id number]

## 2019-03-15 ENCOUNTER — NURSE ONLY (OUTPATIENT)
Dept: SURGERY | Facility: CLINIC | Age: 69
End: 2019-03-15
Payer: COMMERCIAL

## 2019-03-15 DIAGNOSIS — Z48.01: Primary | ICD-10-CM

## 2019-03-15 DIAGNOSIS — S91.302A UNSPECIFIED OPEN WOUND, LEFT FOOT, INITIAL ENCOUNTER: ICD-10-CM

## 2019-03-15 DIAGNOSIS — Z48.02: Primary | ICD-10-CM

## 2019-03-15 PROCEDURE — G0463 HOSPITAL OUTPT CLINIC VISIT: HCPCS | Performed by: PLASTIC SURGERY

## 2019-03-15 NOTE — PROGRESS NOTES
Surgery 1: L foot melanoma / SNB Kash Caro) / STSG reconstruction  - Date: 03/08/19  - Days Since: 7    Pt here for TIE-Over  removal to L foot . Pt identified w/2 identifiers and orders verified. Pt arrived in wheelchair.   Pt presents w/ surgical dressing

## 2019-03-18 PROCEDURE — 93296 REM INTERROG EVL PM/IDS: CPT | Performed by: INTERNAL MEDICINE

## 2019-03-18 PROCEDURE — 93295 DEV INTERROG REMOTE 1/2/MLT: CPT | Performed by: INTERNAL MEDICINE

## 2019-03-20 RX ORDER — LOSARTAN POTASSIUM 100 MG/1
1 TABLET ORAL DAILY
COMMUNITY
Start: 2015-12-03

## 2019-03-20 RX ORDER — PANTOPRAZOLE SODIUM 40 MG/1
1 TABLET, DELAYED RELEASE ORAL DAILY
COMMUNITY
Start: 2013-05-12

## 2019-03-20 RX ORDER — METOPROLOL SUCCINATE 100 MG/1
TABLET, EXTENDED RELEASE ORAL
COMMUNITY
Start: 2017-08-23

## 2019-03-20 RX ORDER — SIMVASTATIN 80 MG
TABLET ORAL
COMMUNITY

## 2019-03-20 RX ORDER — NITROGLYCERIN 0.4 MG/1
TABLET SUBLINGUAL
COMMUNITY

## 2019-03-20 RX ORDER — TAMSULOSIN HYDROCHLORIDE 0.4 MG/1
CAPSULE ORAL
COMMUNITY

## 2019-03-20 RX ORDER — DIPHENHYDRAMINE HCL 25 MG
TABLET ORAL
COMMUNITY
End: 2020-08-31

## 2019-03-20 RX ORDER — LORATADINE 10 MG/1
TABLET ORAL
COMMUNITY
End: 2019-09-09

## 2019-03-20 RX ORDER — FUROSEMIDE 40 MG/1
TABLET ORAL
COMMUNITY
Start: 2017-05-19

## 2019-03-20 RX ORDER — MECLIZINE HYDROCHLORIDE 25 MG/1
TABLET ORAL
COMMUNITY
End: 2019-04-17 | Stop reason: CLARIF

## 2019-03-20 RX ORDER — LORAZEPAM 1 MG/1
TABLET ORAL
COMMUNITY
End: 2019-09-09

## 2019-03-22 ENCOUNTER — NURSE ONLY (OUTPATIENT)
Dept: SURGERY | Facility: CLINIC | Age: 69
End: 2019-03-22
Payer: COMMERCIAL

## 2019-03-22 DIAGNOSIS — Z48.02: Primary | ICD-10-CM

## 2019-03-22 DIAGNOSIS — S91.302A UNSPECIFIED OPEN WOUND, LEFT FOOT, INITIAL ENCOUNTER: ICD-10-CM

## 2019-03-22 PROCEDURE — G0463 HOSPITAL OUTPT CLINIC VISIT: HCPCS | Performed by: PLASTIC SURGERY

## 2019-03-22 RX ORDER — LORATADINE 10 MG/1
TABLET ORAL
COMMUNITY
End: 2019-08-22

## 2019-03-22 RX ORDER — DIPHENHYDRAMINE HCL 25 MG
25 TABLET ORAL EVERY 8 HOURS PRN
COMMUNITY

## 2019-03-22 NOTE — PROGRESS NOTES
Surgery 1: L foot melanoma / SNB Melvin Rivero) / STSG reconstruction  - Date: 03/08/19  - Days Since: 14    Pt here for staple removal to left foot. Pt identified w/2 identifiers and orders verified. Pt presents w/ace bandage kerlix C/D/I.   Pt denies c/o pain

## 2019-03-25 DIAGNOSIS — E78.2 MIXED HYPERLIPIDEMIA: ICD-10-CM

## 2019-03-25 RX ORDER — SIMVASTATIN 80 MG
TABLET ORAL
Qty: 90 TABLET | Refills: 0 | Status: SHIPPED | OUTPATIENT
Start: 2019-03-25 | End: 2019-06-19

## 2019-03-26 DIAGNOSIS — M79.672 LEFT FOOT PAIN: ICD-10-CM

## 2019-03-26 DIAGNOSIS — C43.72 MELANOMA OF FOOT, LEFT (HCC): ICD-10-CM

## 2019-03-27 RX ORDER — ACETAMINOPHEN AND CODEINE PHOSPHATE 300; 30 MG/1; MG/1
1 TABLET ORAL EVERY 4 HOURS PRN
Qty: 40 TABLET | Refills: 0 | OUTPATIENT
Start: 2019-03-27 | End: 2019-03-31

## 2019-03-27 NOTE — TELEPHONE ENCOUNTER
No Protocol on this med. Controlled medication pending for review. If approved needs to be called in or faxed by on-site staff.       Requested Prescriptions     Pending Prescriptions Disp Refills   • Acetaminophen-Codeine #3 300-30 MG Oral Tab 40 ta

## 2019-03-28 ENCOUNTER — OFFICE VISIT (OUTPATIENT)
Dept: SURGERY | Facility: CLINIC | Age: 69
End: 2019-03-28
Payer: COMMERCIAL

## 2019-03-28 DIAGNOSIS — S91.302A UNSPECIFIED OPEN WOUND, LEFT FOOT, INITIAL ENCOUNTER: Primary | ICD-10-CM

## 2019-03-28 DIAGNOSIS — C43.72 MALIGNANT MELANOMA OF LEFT LOWER EXTREMITY INCLUDING HIP (HCC): ICD-10-CM

## 2019-03-28 PROCEDURE — G0463 HOSPITAL OUTPT CLINIC VISIT: HCPCS | Performed by: PLASTIC SURGERY

## 2019-03-28 PROCEDURE — 99024 POSTOP FOLLOW-UP VISIT: CPT | Performed by: PLASTIC SURGERY

## 2019-03-28 NOTE — PROGRESS NOTES
Surgery 1: L foot melanoma / SNB Tamirirma Reyna) / STSG reconstruction  - Date: 03/08/19  - Days Since: 20      Pt states he's doing \"good\"   Pt arrived using crutches, and dressings to L foot  Denies pain, numbness and tingling and no s/s of infection  L foot

## 2019-03-28 NOTE — PROGRESS NOTES
Per Dr. Suzzanna Severin' evaluation, applied Eucerin to L foot and pt verbally instructed to do Eucerin massages to L foot and R thigh incisional sites TID.    Pt given verbal and written orders to get stockings from [address] and to apply gauzes and stoc

## 2019-03-29 ENCOUNTER — OFFICE VISIT (OUTPATIENT)
Dept: SURGERY | Facility: CLINIC | Age: 69
End: 2019-03-29
Payer: COMMERCIAL

## 2019-03-29 DIAGNOSIS — C43.72: Primary | ICD-10-CM

## 2019-03-29 PROCEDURE — 99024 POSTOP FOLLOW-UP VISIT: CPT | Performed by: SURGERY

## 2019-03-29 PROCEDURE — G0463 HOSPITAL OUTPT CLINIC VISIT: HCPCS | Performed by: SURGERY

## 2019-03-29 NOTE — PROGRESS NOTES
Postoperative Patient Follow-up      3/29/2019    Lenore Lockhart 77year old      HPI  Patient presents with:  Post-Op: Left Heel [facility] surgery 3/8/19 patient states little pain 3    Has had close plastics f/u, out of crutches with limited wt be

## 2019-03-30 RX ORDER — POTASSIUM CHLORIDE 750 MG/1
TABLET, EXTENDED RELEASE ORAL
Qty: 90 TABLET | Refills: 0 | Status: SHIPPED | OUTPATIENT
Start: 2019-03-30 | End: 2019-06-27

## 2019-03-30 NOTE — TELEPHONE ENCOUNTER
No Protocol on this med.      Requested Prescriptions     Pending Prescriptions Disp Refills   • POTASSIUM CHLORIDE ER 10 MEQ Oral Tab CR [Pharmacy Med Name: POTASSIUM CL MICRO 10MEQ ER TABS] 90 tablet 0     Sig: TAKE 1 TABLET BY MOUTH EVERY DAY       Last

## 2019-03-31 DIAGNOSIS — M79.672 LEFT FOOT PAIN: ICD-10-CM

## 2019-03-31 DIAGNOSIS — C43.72 MELANOMA OF FOOT, LEFT (HCC): ICD-10-CM

## 2019-04-01 RX ORDER — ACETAMINOPHEN AND CODEINE PHOSPHATE 300; 30 MG/1; MG/1
1 TABLET ORAL EVERY 4 HOURS PRN
Qty: 40 TABLET | Refills: 0 | OUTPATIENT
Start: 2019-04-01 | End: 2019-04-18

## 2019-04-01 NOTE — TELEPHONE ENCOUNTER
No Protocol on this med. Controlled medication pending for review. If approved needs to be called in or faxed by on-site staff.       Requested Prescriptions     Pending Prescriptions Disp Refills   • Acetaminophen-Codeine #3 300-30 MG Oral Tab 40 tabl

## 2019-04-03 ENCOUNTER — PATIENT MESSAGE (OUTPATIENT)
Dept: FAMILY MEDICINE CLINIC | Facility: CLINIC | Age: 69
End: 2019-04-03

## 2019-04-03 DIAGNOSIS — M79.672 LEFT FOOT PAIN: ICD-10-CM

## 2019-04-03 DIAGNOSIS — C43.72 MELANOMA OF FOOT, LEFT (HCC): ICD-10-CM

## 2019-04-03 RX ORDER — ACETAMINOPHEN AND CODEINE PHOSPHATE 300; 30 MG/1; MG/1
1 TABLET ORAL EVERY 4 HOURS PRN
Qty: 40 TABLET | Refills: 0 | Status: CANCELLED | OUTPATIENT
Start: 2019-04-03

## 2019-04-03 NOTE — TELEPHONE ENCOUNTER
Script approved by [facility] and sent to pharm 4/1/19. Needs to be called in to pharm.  pls assist.

## 2019-04-04 NOTE — TELEPHONE ENCOUNTER
From: Navin Stallworth  To: Darryl Nunn DO  Sent: 4/3/2019 9:32 AM CDT  Subject: Prescription Question    I check with [facility] and they do not show your approval for Tylenol #3.  Whatever you can do to get that rectified would be greatly appreciat

## 2019-04-04 NOTE — TELEPHONE ENCOUNTER
Medication Detail    Disp Refills Start End    Acetaminophen-Codeine #3 300-30 MG Oral Tab 40 tablet 0 4/1/2019     Sig - Route:  Take 1 tablet by mouth every 4 (four) hours as needed for Pain. - Oral    Class: Phone in    Rx called in to [facility]

## 2019-04-08 DIAGNOSIS — I10 ESSENTIAL HYPERTENSION: ICD-10-CM

## 2019-04-08 RX ORDER — LOSARTAN POTASSIUM 100 MG/1
TABLET ORAL
Qty: 90 TABLET | Refills: 0 | Status: SHIPPED | OUTPATIENT
Start: 2019-04-08 | End: 2019-07-03

## 2019-04-12 PROBLEM — I25.10 CORONARY ATHEROSCLEROSIS OF NATIVE CORONARY ARTERY: Status: ACTIVE | Noted: 2019-04-12

## 2019-04-12 PROBLEM — I10 ESSENTIAL HYPERTENSION, BENIGN: Status: ACTIVE | Noted: 2019-04-12

## 2019-04-12 PROBLEM — I50.20 HEART FAILURE, SYSTOLIC (CMD): Status: ACTIVE | Noted: 2019-04-12

## 2019-04-12 PROBLEM — I42.0: Status: ACTIVE | Noted: 2019-04-12

## 2019-04-12 RX ORDER — HYDROCODONE BITARTRATE AND ACETAMINOPHEN 7.5; 325 MG/1; MG/1
TABLET ORAL
Refills: 0 | COMMUNITY
Start: 2019-02-28 | End: 2019-04-17 | Stop reason: CLARIF

## 2019-04-12 RX ORDER — POTASSIUM CHLORIDE 750 MG/1
10 TABLET, EXTENDED RELEASE ORAL DAILY
COMMUNITY
Start: 2019-01-03

## 2019-04-15 ENCOUNTER — ANCILLARY PROCEDURE (OUTPATIENT)
Dept: CARDIOLOGY | Age: 69
End: 2019-04-15
Attending: INTERNAL MEDICINE

## 2019-04-15 DIAGNOSIS — Z45.02 IMPLANTABLE DEFIBRILLATOR REPROGRAMMING/CHECK: ICD-10-CM

## 2019-04-15 PROBLEM — I82.622 ACUTE EMBOLISM AND THROMBOSIS OF DEEP VEIN OF LEFT UPPER EXTREMITY (CMD): Status: ACTIVE | Noted: 2019-04-15

## 2019-04-17 ENCOUNTER — OFFICE VISIT (OUTPATIENT)
Dept: CARDIOLOGY | Age: 69
End: 2019-04-17

## 2019-04-17 VITALS
WEIGHT: 250 LBS | DIASTOLIC BLOOD PRESSURE: 66 MMHG | HEART RATE: 60 BPM | BODY MASS INDEX: 32.08 KG/M2 | SYSTOLIC BLOOD PRESSURE: 126 MMHG | HEIGHT: 74 IN

## 2019-04-17 DIAGNOSIS — I25.10 ATHEROSCLEROSIS OF NATIVE CORONARY ARTERY OF NATIVE HEART WITHOUT ANGINA PECTORIS: ICD-10-CM

## 2019-04-17 DIAGNOSIS — I50.22 CHRONIC SYSTOLIC HEART FAILURE (CMD): Primary | ICD-10-CM

## 2019-04-17 DIAGNOSIS — Z95.810 ICD (IMPLANTABLE CARDIOVERTER-DEFIBRILLATOR), SINGLE, IN SITU: ICD-10-CM

## 2019-04-17 DIAGNOSIS — I10 ESSENTIAL HYPERTENSION, BENIGN: ICD-10-CM

## 2019-04-17 DIAGNOSIS — I42.0 PRIMARY DILATED CARDIOMYOPATHY (CMD): ICD-10-CM

## 2019-04-17 PROCEDURE — 3078F DIAST BP <80 MM HG: CPT | Performed by: INTERNAL MEDICINE

## 2019-04-17 PROCEDURE — 3074F SYST BP LT 130 MM HG: CPT | Performed by: INTERNAL MEDICINE

## 2019-04-17 PROCEDURE — 99213 OFFICE O/P EST LOW 20 MIN: CPT | Performed by: INTERNAL MEDICINE

## 2019-04-17 SDOH — HEALTH STABILITY: MENTAL HEALTH: HOW OFTEN DO YOU HAVE A DRINK CONTAINING ALCOHOL?: NEVER

## 2019-04-18 ENCOUNTER — OFFICE VISIT (OUTPATIENT)
Dept: FAMILY MEDICINE CLINIC | Facility: CLINIC | Age: 69
End: 2019-04-18
Payer: COMMERCIAL

## 2019-04-18 VITALS
WEIGHT: 250 LBS | HEART RATE: 55 BPM | SYSTOLIC BLOOD PRESSURE: 124 MMHG | DIASTOLIC BLOOD PRESSURE: 71 MMHG | RESPIRATION RATE: 16 BRPM | HEIGHT: 74 IN | BODY MASS INDEX: 32.08 KG/M2

## 2019-04-18 DIAGNOSIS — M79.672 LEFT FOOT PAIN: ICD-10-CM

## 2019-04-18 DIAGNOSIS — I10 ESSENTIAL HYPERTENSION: ICD-10-CM

## 2019-04-18 DIAGNOSIS — C43.72 MALIGNANT MELANOMA OF LEFT FOOT (HCC): Primary | ICD-10-CM

## 2019-04-18 DIAGNOSIS — C43.72 MELANOMA OF FOOT, LEFT (HCC): ICD-10-CM

## 2019-04-18 DIAGNOSIS — R97.20 INCREASED PROSTATE SPECIFIC ANTIGEN (PSA) VELOCITY: ICD-10-CM

## 2019-04-18 PROCEDURE — 99214 OFFICE O/P EST MOD 30 MIN: CPT | Performed by: FAMILY MEDICINE

## 2019-04-18 PROCEDURE — G0463 HOSPITAL OUTPT CLINIC VISIT: HCPCS | Performed by: FAMILY MEDICINE

## 2019-04-18 RX ORDER — ACETAMINOPHEN AND CODEINE PHOSPHATE 300; 30 MG/1; MG/1
1 TABLET ORAL EVERY 4 HOURS PRN
Qty: 50 TABLET | Refills: 0 | OUTPATIENT
Start: 2019-04-18 | End: 2019-04-29

## 2019-04-18 RX ORDER — ACETAMINOPHEN AND CODEINE PHOSPHATE 300; 30 MG/1; MG/1
1 TABLET ORAL EVERY 4 HOURS PRN
Qty: 40 TABLET | Refills: 0 | OUTPATIENT
Start: 2019-04-18 | End: 2019-04-18

## 2019-04-18 NOTE — PATIENT INSTRUCTIONS
Pain management. Tylenol 3 reviewed and renewed. We will give consideration to a nerve desensitizing medication such as Lyrica or gabapentin. Pneumovax 13 ordered and administered on today. Keep cardiology appointments as recommended.   Keep urology courtney

## 2019-04-18 NOTE — PROGRESS NOTES
HPI:    Patient ID: Santiago Treviño is a 77year old male.     Patient is a 20-year-old -American male who is here to follow-up regarding continued and expected left solar foot pain secondary to removal of malignant melanoma of the left foot that ne Resp: 16       PHYSICAL EXAM:   Physical Exam    Constitutional: He is oriented to person, place, and time. He appears distressed. Cardiovascular: Normal rate and regular rhythm. Murmur heard.   Pulmonary/Chest: Effort normal and breath sounds normal.

## 2019-04-19 ENCOUNTER — PATIENT MESSAGE (OUTPATIENT)
Dept: FAMILY MEDICINE CLINIC | Facility: CLINIC | Age: 69
End: 2019-04-19

## 2019-04-19 NOTE — TELEPHONE ENCOUNTER
From: Batsheva Thomas  To: Daniella Osorio DO  Sent: 4/19/2019 12:11 PM CDT  Subject: Referral Request    I did not see the referral to the urologist following my visit on yesterday.

## 2019-04-19 NOTE — TELEPHONE ENCOUNTER
To: Skye Dao      From: Siobhan May RN      Created: 4/19/2019 12:22 PM        Zohreh Lombardo, Here is the referral information for you.  Thank you, Ivy GONZALES    Patient Information:  Patient Name: Lety Calix, ([id number])     ZTB: juju

## 2019-04-22 ENCOUNTER — LAB REQUISITION (OUTPATIENT)
Dept: LAB | Facility: HOSPITAL | Age: 69
End: 2019-04-22
Payer: MEDICARE

## 2019-04-22 DIAGNOSIS — C43.72 MALIGNANT MELANOMA OF LEFT LOWER EXTREMITY INCLUDING HIP (HCC): ICD-10-CM

## 2019-04-22 PROCEDURE — 88363 XM ARCHIVE TISSUE MOLEC ANAL: CPT | Performed by: PATHOLOGY

## 2019-04-22 PROCEDURE — 81210 BRAF GENE: CPT | Performed by: PATHOLOGY

## 2019-04-23 ENCOUNTER — OFFICE VISIT (OUTPATIENT)
Dept: HEMATOLOGY/ONCOLOGY | Facility: HOSPITAL | Age: 69
End: 2019-04-23
Attending: INTERNAL MEDICINE
Payer: MEDICARE

## 2019-04-23 VITALS
TEMPERATURE: 98 F | HEIGHT: 74 IN | SYSTOLIC BLOOD PRESSURE: 119 MMHG | BODY MASS INDEX: 31.57 KG/M2 | OXYGEN SATURATION: 98 % | RESPIRATION RATE: 18 BRPM | HEART RATE: 61 BPM | WEIGHT: 246 LBS | DIASTOLIC BLOOD PRESSURE: 60 MMHG

## 2019-04-23 DIAGNOSIS — R97.20 ELEVATED PSA: ICD-10-CM

## 2019-04-23 DIAGNOSIS — C43.9 MALIGNANT MELANOMA, UNSPECIFIED SITE (HCC): Primary | ICD-10-CM

## 2019-04-23 PROCEDURE — 99205 OFFICE O/P NEW HI 60 MIN: CPT | Performed by: INTERNAL MEDICINE

## 2019-04-23 NOTE — PROGRESS NOTES
Cancer Center Consultation Note    Patient Name: Williams Gibson   YOB: 1950   Medical Record Number: [id number]   CSN: [id number]   Consulting Physician: Andria Ly MD  Referring Physician(s): Chuck Schmitt  Date of Consultation: 4/ wound rotation flap closure with split- thickness skin graft from right thigh by plastic surgeon, Dr. Stuart Pena. Satish Eric presents at the time of consultation alone.   He reports being in his usual state of health over the last few months prior t Performed by Molly Castle MD at [address] [place] [address] ENDOSCOPY   • CORONARY [address]  2004    [facility] ([place])   • [address] [place] [address] Dr >4CM TRUNK,ARM,LEG Left 03/08/2019    heel melanoma   • EXCISION OF MELANOMA Left 3/8/2019    Performed by Palak Snow MD at [address] [place] [address] on file      Stress: Not on file    Relationships      Social connections:        Talks on phone: Not on file        Gets together: Not on file        Attends Orthodox service: Not on file        Active member of club or organization: Not on file        A MG) BY MOUTH EVERY NIGHT Disp: 90 tablet Rfl: 0   loratadine 10 MG Oral Tab PRN Disp:  Rfl:    diphenhydrAMINE HCl 25 MG Oral Tab PRN Disp:  Rfl:    PANTOPRAZOLE SODIUM 40 MG Oral Tab EC TAKE 1 TABLET(40 MG) BY MOUTH EVERY MORNING BEFORE BREAKFAST Disp: 90 SpO2 98%   BMI 31.58 kg/m²     Physical Examination:    General: Patient is alert and oriented x 3, not in acute distress. Psych: Friendly, cooperative with appropriate questions and responses  HEENT: EOMs intact. Oropharynx is clear.    Neck: No palpabl appropriate control reactivity) performed on block A1 are negative and support the above diagnosis.     B. Left inguinal sentinel lymph node #2; dissection:   · Two lymph nodes, negative for metastatic melanoma (0/2).   · Immunohistochemical stain for Melan margins are free of melanoma. · Preliminary pathologic staging pT3b, N1     H.  Skin, left heel, deep margin; excision:    · Fibroadipose tissue with neovascularization, negative for melanoma.     Comment:  The diagnosis of malignancy is reviewed by tong from treatment with combination oral chemotherapy using Dabrafenib/trametinib. --Otherwise, he is aware of use of immunotherapy using pembrolizumab every 3 weeks versus nivolumab which can be administered every 2 weeks or every 4 weeks.  All adjuvant sy

## 2019-04-25 ENCOUNTER — OFFICE VISIT (OUTPATIENT)
Dept: SURGERY | Facility: CLINIC | Age: 69
End: 2019-04-25
Payer: COMMERCIAL

## 2019-04-25 DIAGNOSIS — S91.302A UNSPECIFIED OPEN WOUND, LEFT FOOT, INITIAL ENCOUNTER: Primary | ICD-10-CM

## 2019-04-25 PROCEDURE — 99024 POSTOP FOLLOW-UP VISIT: CPT | Performed by: PLASTIC SURGERY

## 2019-04-25 PROCEDURE — G0463 HOSPITAL OUTPT CLINIC VISIT: HCPCS | Performed by: PLASTIC SURGERY

## 2019-04-25 NOTE — PROGRESS NOTES
Surgery 1: L foot melanoma / [facility]) / STSG reconstruction  - Date: 03/08/19  - Days Since: 50  \"It really hurts to walk on graft\"  Ambulating without assistance. Gait steady. Continues to \"toe walk\" majority of time due to pain.   Intermittent

## 2019-04-29 DIAGNOSIS — M79.672 LEFT FOOT PAIN: ICD-10-CM

## 2019-04-29 DIAGNOSIS — C43.72 MELANOMA OF FOOT, LEFT (HCC): ICD-10-CM

## 2019-04-30 ENCOUNTER — PATIENT MESSAGE (OUTPATIENT)
Dept: FAMILY MEDICINE CLINIC | Facility: CLINIC | Age: 69
End: 2019-04-30

## 2019-04-30 ENCOUNTER — OFFICE VISIT (OUTPATIENT)
Dept: NEUROLOGY | Facility: CLINIC | Age: 69
End: 2019-04-30
Payer: COMMERCIAL

## 2019-04-30 VITALS
RESPIRATION RATE: 18 BRPM | SYSTOLIC BLOOD PRESSURE: 104 MMHG | DIASTOLIC BLOOD PRESSURE: 64 MMHG | WEIGHT: 246 LBS | BODY MASS INDEX: 31.57 KG/M2 | HEIGHT: 74 IN | HEART RATE: 60 BPM

## 2019-04-30 DIAGNOSIS — E66.9 OBESITY (BMI 30.0-34.9): ICD-10-CM

## 2019-04-30 DIAGNOSIS — I10 ESSENTIAL HYPERTENSION: ICD-10-CM

## 2019-04-30 DIAGNOSIS — C43.72 MALIGNANT MELANOMA OF LEFT FOOT (HCC): Primary | ICD-10-CM

## 2019-04-30 DIAGNOSIS — I25.2 HISTORY OF MI (MYOCARDIAL INFARCTION): ICD-10-CM

## 2019-04-30 PROCEDURE — 99204 OFFICE O/P NEW MOD 45 MIN: CPT | Performed by: PHYSICAL MEDICINE & REHABILITATION

## 2019-04-30 RX ORDER — GABAPENTIN 300 MG/1
300 CAPSULE ORAL 3 TIMES DAILY
Qty: 90 CAPSULE | Refills: 0 | Status: SHIPPED | OUTPATIENT
Start: 2019-04-30 | End: 2019-05-27

## 2019-04-30 RX ORDER — ACETAMINOPHEN AND CODEINE PHOSPHATE 300; 30 MG/1; MG/1
1 TABLET ORAL EVERY 4 HOURS PRN
Qty: 50 TABLET | Refills: 0 | OUTPATIENT
Start: 2019-04-30 | End: 2019-06-12

## 2019-04-30 NOTE — PATIENT INSTRUCTIONS
-Keep using the cream on the sole of the foot  -Take good care of the wound  -Follow up with ramin and nelia to consider a rocker bottom shoe custom made  -Start gabapentin 300mg dose at night time, if drowsy call the office.  If tolerated well then incre

## 2019-04-30 NOTE — PROGRESS NOTES
[address]  NEW PATIENT EVALUATION    Consultation as a request of Dr. Shayy Pro  Chief Complaint: left heel pain. HISTORY OF PRESENT ILLNESS:   Patient presents with:   Foot Pain: new right handed patient c/ [facility] attack ([facility] 2004   • High blood pressure    • History of blood transfusion 2014   • Hyperlipidemia    • Kidney stones    • Melanoma ([facility] 11/29/2018    Left Plantar Heel   • Pneumonia 1950, 2012   • Visual impairment     glasses         PAST SURGICA silver sulfADIAZINE 1 % External Cream Apply 1 Application topically 2 (two) times daily.  80mg tube Disp: 1 Tube Rfl: 0   LOSARTAN 100 MG Oral Tab TAKE 1 TABLET(100 MG) BY MOUTH DAILY Disp: 90 tablet Rfl: 0   POTASSIUM CHLORIDE ER 10 MEQ Oral Tab CR TAKE disturbance  Ears, nose, mouth, throat, and face: negative for hearing loss, nasal congestion and sore throat  Respiratory: negative for cough and dyspnea on exertion  Cardiovascular: negative for chest pain, dyspnea, exertional chest pressure/discomfort, all planes of the ankle.    Strength: 5 out of 5  Sensation: Intact to light touch in all dermatomes of the lower extremities      LABS:   No results found for: EAG, A1C  Lab Results   Component Value Date    WBC 7.6 03/14/2019    RBC 4.92 03/14/2019    HGB putting any pressure in the heel area. As result, his gait is altered and I am afraid that he will have more pain in the forefoot and midfoot area if he continues to walk with his gait pattern.   For this reason, I recommended the patient follow-up with [facility]

## 2019-04-30 NOTE — TELEPHONE ENCOUNTER
Review pended refill request as it does not fall under a protocol.     Last Rx: 4/18/19 # 50#0  LOV: 4/18/19    Requested Prescriptions   Pending Prescriptions Disp Refills   • Acetaminophen-Codeine #3 300-30 MG Oral Tab 50 tablet 0     Sig: Take 1 tablet b

## 2019-05-01 ENCOUNTER — HOSPITAL ENCOUNTER (OUTPATIENT)
Dept: NUCLEAR MEDICINE | Facility: HOSPITAL | Age: 69
Discharge: HOME | End: 2019-05-01
Attending: INTERNAL MEDICINE
Payer: MEDICARE

## 2019-05-01 DIAGNOSIS — C43.9 MALIGNANT MELANOMA, UNSPECIFIED SITE (HCC): ICD-10-CM

## 2019-05-01 PROCEDURE — 78816 PET IMAGE W/CT FULL BODY: CPT | Performed by: INTERNAL MEDICINE

## 2019-05-01 PROCEDURE — 82962 GLUCOSE BLOOD TEST: CPT

## 2019-05-01 NOTE — TELEPHONE ENCOUNTER
From: Cezar Garcia  To: Jc Thomas DO  Sent: 4/30/2019 6:56 PM CDT  Subject: Prescription Question    PHARMACY said they did not get prescription renewal    Your prescription(s) renewals have been processed:       PRESCRIPTION(S) SENT DIRECTL

## 2019-05-02 ENCOUNTER — OFFICE VISIT (OUTPATIENT)
Dept: HEMATOLOGY/ONCOLOGY | Facility: HOSPITAL | Age: 69
End: 2019-05-02
Attending: INTERNAL MEDICINE
Payer: MEDICARE

## 2019-05-02 VITALS
HEIGHT: 74 IN | OXYGEN SATURATION: 96 % | RESPIRATION RATE: 18 BRPM | BODY MASS INDEX: 31.32 KG/M2 | TEMPERATURE: 98 F | DIASTOLIC BLOOD PRESSURE: 70 MMHG | WEIGHT: 244 LBS | SYSTOLIC BLOOD PRESSURE: 121 MMHG | HEART RATE: 55 BPM

## 2019-05-02 DIAGNOSIS — C43.72 MALIGNANT MELANOMA OF LEFT FOOT (HCC): ICD-10-CM

## 2019-05-02 DIAGNOSIS — Z51.12 ENCOUNTER FOR ANTINEOPLASTIC IMMUNOTHERAPY: ICD-10-CM

## 2019-05-02 DIAGNOSIS — R97.20 ELEVATED PSA: Primary | ICD-10-CM

## 2019-05-02 PROCEDURE — 99215 OFFICE O/P EST HI 40 MIN: CPT | Performed by: INTERNAL MEDICINE

## 2019-05-02 NOTE — PROGRESS NOTES
Cancer Center Progress Note    Patient Name: Sonny Williamson   YOB: 1950   Medical Record Number: [id number]   CSN: [id number]   Consulting Physician: Arcadio Guadalupe MD  Referring Physician(s): Jim Gonzalez  Date of Visit: 5/02/2019 closure with split- thickness skin graft from right thigh by plastic surgeon, Dr. Emily Becerra. Hao Lobo presents at the time of consultation alone.   He reports being in his usual state of health over the last few months prior to diagnosis or curre Plantar Heel   • Pneumonia 1950, 2012   • Visual impairment     glasses       Past Surgical History:  Past Surgical History:   Procedure Laterality Date   • BIOPSY OF SKIN LESION Left 11/29/2018    heel, Dr. Julissa Rg   • [address] ([facility])  11/29 Needs      Financial resource strain: Not on file      Food insecurity:        Worry: Not on file        Inability: Not on file      Transportation needs:        Medical: Not on file        Non-medical: Not on file    Tobacco Use      Smoking status: Never Asked        Back Care: Not Asked        Exercise: No        Bike Helmet: Not Asked        Seat Belt: Not Asked        Self-Exams: Not Asked    Social History Narrative      Madelaine Siegel is  for 44 yrs to spouse, Daja Mckay.  Father of 2 adult alexus Sublingual SL Tab Place 1 tablet (0.4 mg total) under the tongue every 5 (five) minutes as needed for Chest pain. Disp: 100 tablet Rfl: 0   furosemide 40 MG Oral Tab Take 40 mg by mouth every morning.    Disp:  Rfl:    aspirin 81 MG Oral Tab Take 81 mg by m normal.  Abdomen: Soft, non tender with good bowel sounds. No hepatosplenomegaly. No palpable mass. Extremities: No edema or calf tenderness. +left heel with open wound with mild erythema and drainage noted  Neurological: Grossly intact.      Performance dissection:   · Two lymph nodes, negative for metastatic melanoma (0/2). · Immunohistochemical stain for Melan-A and SOX-10 (with appropriate control reactivity) performed on block B1 are negative and support the above diagnosis.     C.  Left inguinal sent with neovascularization, negative for melanoma.     Comment:  The diagnosis of malignancy is reviewed by another pathologist who concurs with the diagnostic impression.          Impression:    (C43.72) Malignant melanoma of left foot [facility])    77year old M year after his surgery date.     --pt has clinical M0 disease via PET/CT scan     2.) Elevated PSA    --pt is due to see Dr. Lena Amaro for this condition; informed pt there were no concerning findings for prostate cancer by PET/CT scan      Randy Thompson MD

## 2019-05-06 ENCOUNTER — TELEPHONE (OUTPATIENT)
Dept: HEMATOLOGY/ONCOLOGY | Facility: HOSPITAL | Age: 69
End: 2019-05-06

## 2019-05-06 NOTE — TELEPHONE ENCOUNTER
Spoke with [employer] representative who asks if I could provide her with patient's diagnosis, prognosis and severity of illness - apparrantly to expedite the travel of a family member who is in the [employer] for flight back home to be with him.    I

## 2019-05-06 NOTE — TELEPHONE ENCOUNTER
Chandler Das calling from the [employer] asking for a call back needs information on patient. This is to bring a family member home. Please use ref #[id number].  lexie

## 2019-05-06 NOTE — TELEPHONE ENCOUNTER
Call placed to patient and received permission to speak with [employer]. His daughter is  to a  officer. Patient stated that he explained that he was \"OK\" and feels fine.  His appointments and tests have been done independently and

## 2019-05-07 ENCOUNTER — TELEPHONE (OUTPATIENT)
Dept: HEMATOLOGY/ONCOLOGY | Facility: HOSPITAL | Age: 69
End: 2019-05-07

## 2019-05-07 NOTE — TELEPHONE ENCOUNTER
Patient contacted by Dr Geneva Crowley and PET scan reviewed as was done at clinic visit. Explained that the PET was done to determine if there was any distant spread of the melanoma and the PET confirmed no distant spread to any organs.  The PET also showed the ar

## 2019-05-09 ENCOUNTER — OFFICE VISIT (OUTPATIENT)
Dept: SURGERY | Facility: CLINIC | Age: 69
End: 2019-05-09
Payer: COMMERCIAL

## 2019-05-09 ENCOUNTER — OFFICE VISIT (OUTPATIENT)
Dept: HEMATOLOGY/ONCOLOGY | Facility: HOSPITAL | Age: 69
End: 2019-05-09
Attending: PHYSICIAN ASSISTANT
Payer: MEDICARE

## 2019-05-09 DIAGNOSIS — S91.302A UNSPECIFIED OPEN WOUND, LEFT FOOT, INITIAL ENCOUNTER: Primary | ICD-10-CM

## 2019-05-09 DIAGNOSIS — C43.72 MALIGNANT MELANOMA OF LEFT FOOT (HCC): Primary | ICD-10-CM

## 2019-05-09 PROCEDURE — G0463 HOSPITAL OUTPT CLINIC VISIT: HCPCS | Performed by: PLASTIC SURGERY

## 2019-05-09 PROCEDURE — 99024 POSTOP FOLLOW-UP VISIT: CPT | Performed by: PLASTIC SURGERY

## 2019-05-09 PROCEDURE — 99215 OFFICE O/P EST HI 40 MIN: CPT | Performed by: PHYSICIAN ASSISTANT

## 2019-05-09 RX ORDER — PROCHLORPERAZINE MALEATE 10 MG
10 TABLET ORAL EVERY 6 HOURS PRN
Qty: 60 TABLET | Refills: 2 | Status: SHIPPED | OUTPATIENT
Start: 2019-05-09 | End: 2019-08-22

## 2019-05-09 RX ORDER — PROCHLORPERAZINE MALEATE 10 MG
TABLET ORAL
Qty: 360 TABLET | Refills: 2 | OUTPATIENT
Start: 2019-05-09

## 2019-05-09 NOTE — PROGRESS NOTES
Per Dr. Eden Camp' evaluation, applied Silvadene to L heel graft site, gauze and applied compression stocking. Instructed pt to wash L heel daily and apply silvadene BID and begin Eucerin massages to L heel surrounding tissue.

## 2019-05-09 NOTE — PATIENT INSTRUCTIONS
Medication Education Record: IV Therapy    Learner:  Patient    Barriers / Limitations:  None    Psychosocial Assessment:  patient psychosocial response appropriate    Diagnosis:   Melanoma    IV Cancer Treatment Name(s): Pembrolizumab  IV Cancer Treatment occurring.     Recommended Anti-nausea medications (as directed by your provider):  Prochloperazine (Compazine) 10 mg every 6 hours    Helpful hints during cancer treatment:    Diet:  o Avoid greasy or spicy foods on days surrounding chemotherapy  o Eat sma contact the triage nurse for further instructions. Skin Care  o Avoid direct sunlight.  o Wear a broad-spectrum sunscreen with an SPF of 30 or higher on any skin exposed to the sun. Re-apply every 2 hours if in the sun and after bathing or sweating.   o F information about chemotherapy for yourself or family members: http://www.hernandez.info/. html        Safety and Handling of Chemotherapy  While you or your family member is receiving chemotherapy, whether in the clinic or at home, the fo be used throughout treatment to prevent pregnancy while on these medications and for several months or years after therapy. Chemotherapy can have harmful side effects to the fetus, especially in the first trimester.   In addition, menstrual cycles can beco

## 2019-05-09 NOTE — PROGRESS NOTES
Medication Education Record: IV Therapy    Learner:  Patient    Barriers / Limitations:  None    Psychosocial Assessment:  patient psychosocial response appropriate    Diagnosis:   Melanoma    IV Cancer Treatment Name(s): Pembrolizumab  IV Cancer Treatment occurring.     Recommended Anti-nausea medications (as directed by your provider):  Prochloperazine (Compazine) 10 mg every 6 hours    Helpful hints during cancer treatment:    Diet:  o Avoid greasy or spicy foods on days surrounding chemotherapy  o Eat sma contact the triage nurse for further instructions. Skin Care  o Avoid direct sunlight.  o Wear a broad-spectrum sunscreen with an SPF of 30 or higher on any skin exposed to the sun. Re-apply every 2 hours if in the sun and after bathing or sweating.   o F information about chemotherapy for yourself or family members: http://www.hernandez.info/. [address], RACHAEL and Nitin resources provided.      Safety and Handling of Chemotherapy  While you or your family me condom should be used during this time. Effective birth control should be used throughout treatment to prevent pregnancy while on these medications and for several months or years after therapy.   Chemotherapy can have harmful side effects to the fetus, es

## 2019-05-09 NOTE — PROGRESS NOTES
Surgery 1: L foot melanoma / [facility] Jonas Verdin) / STSG reconstruction  - Date: 03/08/19  - Days Since: 58    Pt states he's doing \"good\"   Pt arrived wearing compression stockings JAVON foot and has gauze to L heel  C/o constant throbbing pain to L heel graft si

## 2019-05-10 ENCOUNTER — OFFICE VISIT (OUTPATIENT)
Dept: SURGERY | Facility: CLINIC | Age: 69
End: 2019-05-10
Payer: COMMERCIAL

## 2019-05-10 DIAGNOSIS — C43.9 ACRAL LENTIGINOUS MELANOMA (HCC): Primary | ICD-10-CM

## 2019-05-10 PROCEDURE — G0463 HOSPITAL OUTPT CLINIC VISIT: HCPCS | Performed by: SURGERY

## 2019-05-10 PROCEDURE — 99024 POSTOP FOLLOW-UP VISIT: CPT | Performed by: SURGERY

## 2019-05-13 ENCOUNTER — ANCILLARY PROCEDURE (OUTPATIENT)
Dept: CARDIOLOGY | Age: 69
End: 2019-05-13
Attending: INTERNAL MEDICINE

## 2019-05-13 ENCOUNTER — LAB ENCOUNTER (OUTPATIENT)
Dept: LAB | Facility: HOSPITAL | Age: 69
End: 2019-05-13
Attending: INTERNAL MEDICINE
Payer: MEDICARE

## 2019-05-13 VITALS
HEART RATE: 62 BPM | BODY MASS INDEX: 31.46 KG/M2 | RESPIRATION RATE: 16 BRPM | DIASTOLIC BLOOD PRESSURE: 60 MMHG | SYSTOLIC BLOOD PRESSURE: 120 MMHG | WEIGHT: 245 LBS

## 2019-05-13 DIAGNOSIS — C43.9 MALIGNANT MELANOMA, UNSPECIFIED SITE (HCC): Primary | ICD-10-CM

## 2019-05-13 DIAGNOSIS — Z45.018 PACEMAKER REPROGRAMMING/CHECK: Primary | ICD-10-CM

## 2019-05-13 DIAGNOSIS — C43.72 MALIGNANT MELANOMA OF LEFT FOOT (HCC): Primary | ICD-10-CM

## 2019-05-13 DIAGNOSIS — C43.72 MALIGNANT MELANOMA OF LEFT FOOT (HCC): ICD-10-CM

## 2019-05-13 PROCEDURE — 36415 COLL VENOUS BLD VENIPUNCTURE: CPT

## 2019-05-13 PROCEDURE — 84443 ASSAY THYROID STIM HORMONE: CPT

## 2019-05-13 PROCEDURE — 80053 COMPREHEN METABOLIC PANEL: CPT

## 2019-05-13 PROCEDURE — 93282 PRGRMG EVAL IMPLANTABLE DFB: CPT | Performed by: INTERNAL MEDICINE

## 2019-05-13 PROCEDURE — 85025 COMPLETE CBC W/AUTO DIFF WBC: CPT

## 2019-05-14 ENCOUNTER — OFFICE VISIT (OUTPATIENT)
Dept: HEMATOLOGY/ONCOLOGY | Facility: HOSPITAL | Age: 69
End: 2019-05-14
Attending: PHYSICIAN ASSISTANT
Payer: MEDICARE

## 2019-05-14 ENCOUNTER — TELEPHONE (OUTPATIENT)
Dept: HEMATOLOGY/ONCOLOGY | Facility: HOSPITAL | Age: 69
End: 2019-05-14

## 2019-05-14 VITALS
DIASTOLIC BLOOD PRESSURE: 67 MMHG | TEMPERATURE: 98 F | HEART RATE: 56 BPM | RESPIRATION RATE: 18 BRPM | SYSTOLIC BLOOD PRESSURE: 122 MMHG

## 2019-05-14 DIAGNOSIS — C43.72 MALIGNANT MELANOMA OF LEFT FOOT (HCC): Primary | ICD-10-CM

## 2019-05-14 DIAGNOSIS — C43.9 MALIGNANT MELANOMA, UNSPECIFIED SITE (HCC): Primary | ICD-10-CM

## 2019-05-14 PROCEDURE — 96413 CHEMO IV INFUSION 1 HR: CPT

## 2019-05-14 RX ORDER — LORAZEPAM 0.5 MG/1
0.5 TABLET ORAL EVERY 6 HOURS PRN
Qty: 3 TABLET | Refills: 0 | Status: SHIPPED | OUTPATIENT
Start: 2019-05-14 | End: 2019-08-22

## 2019-05-14 RX ORDER — 0.9 % SODIUM CHLORIDE 0.9 %
VIAL (ML) INJECTION
Status: DISCONTINUED
Start: 2019-05-14 | End: 2019-05-14

## 2019-05-14 RX ORDER — SODIUM CHLORIDE 9 MG/ML
INJECTION, SOLUTION INTRAVENOUS
Status: DISCONTINUED
Start: 2019-05-14 | End: 2019-05-14

## 2019-05-14 NOTE — TELEPHONE ENCOUNTER
Contacted patient, reports that his first infusion of Alida Emmas went very well and he is home, feeling fine. I told him that we would expect him not to experience any unusual side effects so good, all is well.    I explained that Dr Marcellus Mata has ordered an MRI

## 2019-05-14 NOTE — PROGRESS NOTES
Pt here for Taylor Bueno. Arrives Ambulating independently, accompanied by Spouse           Modifications in dose or schedule: No    Patient arrived for treatment today, reporting some pain in his foot for which he takes tylenol with codeine.  PIV started

## 2019-05-14 NOTE — PATIENT INSTRUCTIONS
Helpful hints during cancer treatment:                  Diet:  ? Avoid greasy or spicy foods on days surrounding chemotherapy  ? Eat small frequent meals per day (6-7 meals) rather than 3 large meals  ?  Choose high calorie/high protein foods (chicken, hard sunscreen with an SPF of 30 or higher on any skin exposed to the sun. Re-apply every 2 hours if in the sun and after bathing or sweating. · For dry skin, use an alcohol-free lotion twice per day, especially after baths.   · If scalp hair loss has occurred http://www.hernandez.info/. html           Safety and Handling of Chemotherapy  While you or your family member is receiving chemotherapy, whether in the clinic or at home, the following precautions must be taken to lessen any exposure to these medications and for several months or years after therapy. Chemotherapy can have harmful side effects to the fetus, especially in the first trimester.   In addition, menstrual cycles can become irregular during and after treatment, so you may not kno

## 2019-05-15 ENCOUNTER — PATIENT MESSAGE (OUTPATIENT)
Dept: FAMILY MEDICINE CLINIC | Facility: CLINIC | Age: 69
End: 2019-05-15

## 2019-05-15 NOTE — TELEPHONE ENCOUNTER
From: Monika Galeas  To: Annette Renae DO  Sent: 5/15/2019 4:52 PM CDT  Subject: Prescription Question    Need refill on Furosemide 40 mg

## 2019-05-16 RX ORDER — FUROSEMIDE 40 MG/1
40 TABLET ORAL EVERY MORNING
Qty: 90 TABLET | Refills: 1 | Status: SHIPPED | OUTPATIENT
Start: 2019-05-16 | End: 2019-08-31

## 2019-05-21 ENCOUNTER — TELEPHONE (OUTPATIENT)
Dept: HEMATOLOGY/ONCOLOGY | Facility: HOSPITAL | Age: 69
End: 2019-05-21

## 2019-05-21 DIAGNOSIS — K21.9 GASTROESOPHAGEAL REFLUX DISEASE, ESOPHAGITIS PRESENCE NOT SPECIFIED: ICD-10-CM

## 2019-05-21 RX ORDER — PANTOPRAZOLE SODIUM 40 MG/1
TABLET, DELAYED RELEASE ORAL
Qty: 90 TABLET | Refills: 0 | Status: SHIPPED | OUTPATIENT
Start: 2019-05-21 | End: 2019-08-18

## 2019-05-21 NOTE — TELEPHONE ENCOUNTER
Called and spoke with patient- he states he has been feeling fine. No issues post C1D1. He did let me know MRI was canceled due to ICD. Will let Dr. France Diaz know. Encouraged patient to call if he has any questions or concerns.

## 2019-05-23 ENCOUNTER — TELEPHONE (OUTPATIENT)
Dept: HEMATOLOGY/ONCOLOGY | Facility: HOSPITAL | Age: 69
End: 2019-05-23

## 2019-05-23 ENCOUNTER — OFFICE VISIT (OUTPATIENT)
Dept: SURGERY | Facility: CLINIC | Age: 69
End: 2019-05-23
Payer: COMMERCIAL

## 2019-05-23 DIAGNOSIS — S91.302A UNSPECIFIED OPEN WOUND, LEFT FOOT, INITIAL ENCOUNTER: Primary | ICD-10-CM

## 2019-05-23 PROCEDURE — 99024 POSTOP FOLLOW-UP VISIT: CPT | Performed by: PLASTIC SURGERY

## 2019-05-23 PROCEDURE — G0463 HOSPITAL OUTPT CLINIC VISIT: HCPCS | Performed by: PLASTIC SURGERY

## 2019-05-23 NOTE — PROGRESS NOTES
Surgery 1: L foot melanoma / SN[facility] Maddi Curran) / STSG reconstruction  - Date: 03/08/19  - Days Since: 76      Pt states he's doing \"good\"  Arrived wearing compression socks to JAVON foot  Denies pain to L heel, \"It's tender only when I put all my weight to the

## 2019-05-28 RX ORDER — GABAPENTIN 300 MG/1
CAPSULE ORAL
Qty: 90 CAPSULE | Refills: 0 | Status: SHIPPED | OUTPATIENT
Start: 2019-05-28 | End: 2019-09-17

## 2019-05-28 NOTE — TELEPHONE ENCOUNTER
Refill request for gabapentin 300 mg, TID, #90, no refills    LOV: 4/30/19  NOV: none  Last refilled on 4/30/19    Spoke to patient. He is taking gabapentin 300 mg TID. He is requesting a refill on medication.  Per last office note, informed him that Dr. Trudi Noriega

## 2019-06-02 DIAGNOSIS — N40.0 BENIGN PROSTATIC HYPERPLASIA, UNSPECIFIED WHETHER LOWER URINARY TRACT SYMPTOMS PRESENT: ICD-10-CM

## 2019-06-03 ENCOUNTER — HOSPITAL ENCOUNTER (OUTPATIENT)
Dept: CT IMAGING | Facility: HOSPITAL | Age: 69
Discharge: HOME | End: 2019-06-03
Attending: INTERNAL MEDICINE
Payer: MEDICARE

## 2019-06-03 ENCOUNTER — OFFICE VISIT (OUTPATIENT)
Dept: HEMATOLOGY/ONCOLOGY | Facility: HOSPITAL | Age: 69
End: 2019-06-03
Attending: INTERNAL MEDICINE
Payer: MEDICARE

## 2019-06-03 VITALS
TEMPERATURE: 98 F | BODY MASS INDEX: 31.44 KG/M2 | RESPIRATION RATE: 18 BRPM | HEART RATE: 55 BPM | OXYGEN SATURATION: 98 % | HEIGHT: 74 IN | DIASTOLIC BLOOD PRESSURE: 57 MMHG | SYSTOLIC BLOOD PRESSURE: 107 MMHG | WEIGHT: 245 LBS

## 2019-06-03 DIAGNOSIS — R97.20 ELEVATED PSA: ICD-10-CM

## 2019-06-03 DIAGNOSIS — Z51.12 ENCOUNTER FOR ANTINEOPLASTIC IMMUNOTHERAPY: Primary | ICD-10-CM

## 2019-06-03 DIAGNOSIS — C43.72 MALIGNANT MELANOMA OF LEFT FOOT (HCC): ICD-10-CM

## 2019-06-03 DIAGNOSIS — C43.72 MALIGNANT MELANOMA OF LEFT FOOT (HCC): Primary | ICD-10-CM

## 2019-06-03 PROCEDURE — 80053 COMPREHEN METABOLIC PANEL: CPT

## 2019-06-03 PROCEDURE — 36415 COLL VENOUS BLD VENIPUNCTURE: CPT

## 2019-06-03 PROCEDURE — 70470 CT HEAD/BRAIN W/O & W/DYE: CPT | Performed by: INTERNAL MEDICINE

## 2019-06-03 PROCEDURE — 85025 COMPLETE CBC W/AUTO DIFF WBC: CPT

## 2019-06-03 PROCEDURE — 99215 OFFICE O/P EST HI 40 MIN: CPT | Performed by: INTERNAL MEDICINE

## 2019-06-03 RX ORDER — TAMSULOSIN HYDROCHLORIDE 0.4 MG/1
CAPSULE ORAL
Qty: 90 CAPSULE | Refills: 0 | Status: SHIPPED | OUTPATIENT
Start: 2019-06-03 | End: 2019-06-06

## 2019-06-03 NOTE — PROGRESS NOTES
Cancer Center Progress Note    Patient Name: Yvonne Ornelas   YOB: 1950   Medical Record Number: [id number]   CSN: [id number]   Consulting Physician: Cherise Sal MD  Referring Physician(s): No ref.  provider found  Date of Visit: 6/3/2019 flap closure with split- thickness skin graft from right thigh by plastic surgeon, Dr. Zach Ralph. Johnathan Domínguez presents at the time of consultation alone.   He reports being in his usual state of health over the last few months prior to diagnosis or Gastroenteritis 2015   • Gastrointestinal bleeding 2014   • Heart attack ([facility] 2004   • High blood pressure    • History of blood transfusion 2014   • Hyperlipidemia    • Kidney stones    • Melanoma ([facility] 11/29/2018    Left Plantar Heel   • Pneumonia 1950, Spouse name: Not on file      Number of children: 2      Years of education: Not on file      Highest education level: Not on file    Occupational History      Occupation:         Employer: [employer] Caffeine Concern: No        Occupational Exposure: Not Asked        Hobby Hazards: Not Asked        Sleep Concern: Not Asked        Stress Concern: Not Asked        Weight Concern: Not Asked        Special Diet: Not Asked        Back Care: Not Asked MOUTH EVERY NIGHT Disp: 90 tablet Rfl: 0   loratadine 10 MG Oral Tab PRN Disp:  Rfl:    diphenhydrAMINE HCl 25 MG Oral Tab PRN Disp:  Rfl:    Metoprolol Succinate  MG Oral Tablet 24 Hr Take 1.5 tablets (150 mg total) by mouth daily.  Pt takes 1 1/2 ta appropriate questions and responses  HEENT: EOMs intact. Oropharynx is clear. Neck: No palpable lymphadenopathy. Neck is supple. Lymphatics:  There is no palpable lymphadenopathy throughout in the cervical, supraclavicular, axillary, or inguinal regions PMH relevant for CAD s/p MI, hx of LUE DVT which was unprovoked in 2014 and tx for anticoagulation for 6 months presents for evaluation of malignant melanoma of the left heel of his foot with local regional lymph node involvement; pT3bN1      Plan:    1.) of LUE DVT in 2014    -- unprovoked in 2014 and tx for anticoagulation for 6 months      Dylan Suarez MD  [address] [facility]  [address] [facility], [facility]

## 2019-06-03 NOTE — TELEPHONE ENCOUNTER
Review pended refill request as it does not fall under a protocol.     Last Rx: 3/22/18  LOV: Recent Visits  Date Type Provider Dept   04/18/19 Office Visit DO [facility]   03/14/19 Office Visit DO [facility]

## 2019-06-04 ENCOUNTER — OFFICE VISIT (OUTPATIENT)
Dept: HEMATOLOGY/ONCOLOGY | Facility: HOSPITAL | Age: 69
End: 2019-06-04
Attending: PHYSICIAN ASSISTANT
Payer: MEDICARE

## 2019-06-04 VITALS
HEART RATE: 56 BPM | RESPIRATION RATE: 18 BRPM | DIASTOLIC BLOOD PRESSURE: 65 MMHG | OXYGEN SATURATION: 95 % | SYSTOLIC BLOOD PRESSURE: 127 MMHG | TEMPERATURE: 98 F

## 2019-06-04 DIAGNOSIS — C43.72 MALIGNANT MELANOMA OF LEFT FOOT (HCC): Primary | ICD-10-CM

## 2019-06-04 PROCEDURE — 96413 CHEMO IV INFUSION 1 HR: CPT

## 2019-06-04 RX ORDER — 0.9 % SODIUM CHLORIDE 0.9 %
VIAL (ML) INJECTION
Status: DISCONTINUED
Start: 2019-06-04 | End: 2019-06-04

## 2019-06-04 RX ORDER — SODIUM CHLORIDE 9 MG/ML
INJECTION, SOLUTION INTRAVENOUS
Status: DISCONTINUED
Start: 2019-06-04 | End: 2019-06-04

## 2019-06-04 NOTE — PROGRESS NOTES
Pt here for C2 D1 Ketruda.   Arrives Ambulating independently, accompanied by Spouse           Modifications in dose or schedule: No    Patient arrived for treatment today, reporting significantly less pain in his foot for which he is rarely taking tylenol

## 2019-06-06 ENCOUNTER — OFFICE VISIT (OUTPATIENT)
Dept: NEUROLOGY | Facility: CLINIC | Age: 69
End: 2019-06-06
Payer: COMMERCIAL

## 2019-06-06 VITALS
WEIGHT: 246 LBS | DIASTOLIC BLOOD PRESSURE: 72 MMHG | HEART RATE: 64 BPM | RESPIRATION RATE: 18 BRPM | SYSTOLIC BLOOD PRESSURE: 126 MMHG | BODY MASS INDEX: 31.57 KG/M2 | HEIGHT: 74 IN

## 2019-06-06 DIAGNOSIS — I25.2 HISTORY OF MI (MYOCARDIAL INFARCTION): ICD-10-CM

## 2019-06-06 DIAGNOSIS — E66.9 OBESITY (BMI 30.0-34.9): ICD-10-CM

## 2019-06-06 DIAGNOSIS — C43.72 MALIGNANT MELANOMA OF LEFT FOOT (HCC): Primary | ICD-10-CM

## 2019-06-06 DIAGNOSIS — I10 ESSENTIAL HYPERTENSION: ICD-10-CM

## 2019-06-06 PROCEDURE — 99213 OFFICE O/P EST LOW 20 MIN: CPT | Performed by: PHYSICAL MEDICINE & REHABILITATION

## 2019-06-06 NOTE — PROGRESS NOTES
[address]  NEW PATIENT EVALUATION      Chief Complaint: left heel pain. HISTORY OF PRESENT ILLNESS:   Patient presents with: Foot Pain: LOV 04/30/19 States gabapentin is helping with pain.  Pain has impr has further imaging including PET scan waiting in the near future to evaluate for any metastases. He is not currently on any chemotherapy or radiation treatment. He denies any recent fevers chills or weight loss.       PAST MEDICAL HISTORY:     Past Medic lumbar discectomy   • SPLIT GRFT PROC HEAD,FAC,HAND <100CM Left 03/08/2019    heel reconstruction         CURRENT MEDICATIONS:       Current Outpatient Medications:  GABAPENTIN 300 MG Oral Cap TAKE 1 CAPSULE BY MOUTH THREE TIMES DAILY START WITH NIGHT DOSE Allergies      FAMILY HISTORY:     Family History   Problem Relation Age of Onset   • Heart Disorder Father    • Diabetes Mother    • Heart Disorder Sister    • Diabetes Sister    • Heart Disorder Brother    • Breast Cancer Maternal Grandmother    • Colon Non-labored respirations  Abdomen: No abdominal guarding  Extremities: No lower extremity edema bilaterally   Skin: No lesions noted   Cognition: alert & oriented x 3, attentive, able to follow 2 step commands, comprehention intact, spontaneous speech [place] with patient. ASSESSMENT:     1. Malignant melanoma of left foot ([address]    2. Essential hypertension    3. History of MI (myocardial infarction)    4. Obesity (BMI 30.0-34. 9)        Mr. Tisha Barber is a very pleasant 76-year-old gentleman who presents today f

## 2019-06-06 NOTE — PATIENT INSTRUCTIONS
-Continue lotion and messaging the leg  -Be sure to keep the area clean and avoid barefoot walking if possible  -Can consider the boot as shown in clinic today if noticing more pain or difficulty with ambulating and your gait  -Continue Gabapentin at night

## 2019-06-07 ENCOUNTER — HOSPITAL ENCOUNTER (OUTPATIENT)
Dept: CV DIAGNOSTICS | Facility: HOSPITAL | Age: 69
Discharge: HOME | End: 2019-06-07
Attending: INTERNAL MEDICINE
Payer: MEDICARE

## 2019-06-07 DIAGNOSIS — I25.10 CORONARY ATHEROSCLEROSIS OF NATIVE CORONARY ARTERY: ICD-10-CM

## 2019-06-07 DIAGNOSIS — I50.20 SYSTOLIC HEART FAILURE (HCC): ICD-10-CM

## 2019-06-07 PROCEDURE — 93306 TTE W/DOPPLER COMPLETE: CPT | Performed by: INTERNAL MEDICINE

## 2019-06-12 DIAGNOSIS — C43.72 MELANOMA OF FOOT, LEFT (HCC): ICD-10-CM

## 2019-06-12 DIAGNOSIS — M79.672 LEFT FOOT PAIN: ICD-10-CM

## 2019-06-13 RX ORDER — ACETAMINOPHEN AND CODEINE PHOSPHATE 300; 30 MG/1; MG/1
1 TABLET ORAL EVERY 4 HOURS PRN
Qty: 50 TABLET | Refills: 0 | Status: SHIPPED
Start: 2019-06-13 | End: 2019-08-22

## 2019-06-13 NOTE — TELEPHONE ENCOUNTER
Controlled medication pending for review. If approved needs to be called in or faxed by on-site staff.     Last Rx: 4-30-19  LOV: 4-18-19    Requested Prescriptions     Pending Prescriptions Disp Refills   • Acetaminophen-Codeine #3 300-30 MG Oral Tab 50 t

## 2019-06-18 ENCOUNTER — TELEPHONE (OUTPATIENT)
Dept: CARDIOLOGY | Age: 69
End: 2019-06-18

## 2019-06-19 DIAGNOSIS — E78.2 MIXED HYPERLIPIDEMIA: ICD-10-CM

## 2019-06-19 RX ORDER — SIMVASTATIN 80 MG
TABLET ORAL
Qty: 90 TABLET | Refills: 1 | Status: SHIPPED | OUTPATIENT
Start: 2019-06-19 | End: 2019-12-13

## 2019-06-20 NOTE — TELEPHONE ENCOUNTER
Refill passed per [facility] protocol.   Cholesterol Medications  Protocol Criteria:  · Appointment scheduled in the past 12 months or in the next 3 months  · ALT & LDL on file in the past 12 months  · ALT result < 80  · LDL result <130   Recent Outpat

## 2019-06-24 ENCOUNTER — APPOINTMENT (OUTPATIENT)
Dept: CARDIOLOGY | Age: 69
End: 2019-06-24
Attending: INTERNAL MEDICINE

## 2019-06-24 ENCOUNTER — NURSE ONLY (OUTPATIENT)
Dept: HEMATOLOGY/ONCOLOGY | Facility: HOSPITAL | Age: 69
End: 2019-06-24
Attending: INTERNAL MEDICINE
Payer: MEDICARE

## 2019-06-24 VITALS
BODY MASS INDEX: 32.21 KG/M2 | OXYGEN SATURATION: 98 % | TEMPERATURE: 99 F | SYSTOLIC BLOOD PRESSURE: 127 MMHG | HEART RATE: 60 BPM | HEIGHT: 74 IN | DIASTOLIC BLOOD PRESSURE: 70 MMHG | WEIGHT: 251 LBS | RESPIRATION RATE: 18 BRPM

## 2019-06-24 DIAGNOSIS — Z51.12 ENCOUNTER FOR ANTINEOPLASTIC IMMUNOTHERAPY: ICD-10-CM

## 2019-06-24 DIAGNOSIS — C43.72 MALIGNANT MELANOMA OF LEFT FOOT (HCC): ICD-10-CM

## 2019-06-24 DIAGNOSIS — C43.72 MALIGNANT MELANOMA OF LEFT FOOT (HCC): Primary | ICD-10-CM

## 2019-06-24 PROCEDURE — 80053 COMPREHEN METABOLIC PANEL: CPT

## 2019-06-24 PROCEDURE — 93296 REM INTERROG EVL PM/IDS: CPT | Performed by: INTERNAL MEDICINE

## 2019-06-24 PROCEDURE — 85025 COMPLETE CBC W/AUTO DIFF WBC: CPT

## 2019-06-24 PROCEDURE — 36415 COLL VENOUS BLD VENIPUNCTURE: CPT

## 2019-06-24 PROCEDURE — 99215 OFFICE O/P EST HI 40 MIN: CPT | Performed by: INTERNAL MEDICINE

## 2019-06-24 NOTE — PROGRESS NOTES
Cancer Center Progress Note    Patient Name: Angi Hall   YOB: 1950   Medical Record Number: [id number]   CSN: [id number]    Consulting Physician: Blanca Rust MD  Referring Physician(s): Efren Yan  Date of Visit: 6/24/2019 flap closure with split- thickness skin graft from right thigh by plastic surgeon, Dr. Timi Hickey. Chele Reza presents at the time of consultation alone.   He reports being in his usual state of health over the last few months prior to diagnosis or 1992   • Esophageal reflux    • Gastroenteritis 2015   • Gastrointestinal bleeding 2014   • Heart attack ([facility] 2004   • High blood pressure    • History of blood transfusion 2014   • Hyperlipidemia    • Kidney stones    • Melanoma ([facility] 11/29/2018    Left History      Marital status:       Spouse name: Not on file      Number of children: 2      Years of education: Not on file      Highest education level: Not on file    Occupational History      Occupation:         Employer: [employer] Blood Transfusions: Not Asked        Caffeine Concern: No        Occupational Exposure: Not Asked        Hobby Hazards: Not Asked        Sleep Concern: Not Asked        Stress Concern: Not Asked        Weight Concern: Not Asked        Special Diet: No tablet Rfl: 0   loratadine 10 MG Oral Tab PRN Disp:  Rfl:    diphenhydrAMINE HCl 25 MG Oral Tab PRN Disp:  Rfl:    Metoprolol Succinate  MG Oral Tablet 24 Hr Take 1.5 tablets (150 mg total) by mouth daily. Pt takes 1 1/2 tablet daily.  Disp: 45 tablet responses  HEENT: EOMs intact. Oropharynx is clear. Neck: No palpable lymphadenopathy. Neck is supple. Lymphatics: There is no palpable lymphadenopathy throughout in the cervical, supraclavicular, axillary, or inguinal regions.   Chest: Clear to auscult involvement; pT3bN1      Plan:    1.) Malignant Melanoma -pT3bN1    --underwent wide excision of his left heel melanoma measuring 5.5 cm in diameter, with left popliteal fossa lymph node biopsy as well as left inguinal sentinel lymph node biopsy with Dr. Maine Renee [address] [facility] [address]

## 2019-06-25 ENCOUNTER — TELEPHONE (OUTPATIENT)
Dept: HEMATOLOGY/ONCOLOGY | Facility: HOSPITAL | Age: 69
End: 2019-06-25

## 2019-06-25 ENCOUNTER — OFFICE VISIT (OUTPATIENT)
Dept: HEMATOLOGY/ONCOLOGY | Facility: HOSPITAL | Age: 69
End: 2019-06-25
Attending: PHYSICIAN ASSISTANT
Payer: MEDICARE

## 2019-06-25 VITALS
OXYGEN SATURATION: 96 % | SYSTOLIC BLOOD PRESSURE: 133 MMHG | RESPIRATION RATE: 16 BRPM | TEMPERATURE: 98 F | HEART RATE: 57 BPM | DIASTOLIC BLOOD PRESSURE: 70 MMHG

## 2019-06-25 DIAGNOSIS — C43.72 MALIGNANT MELANOMA OF LEFT FOOT (HCC): Primary | ICD-10-CM

## 2019-06-25 DIAGNOSIS — R30.0 DYSURIA: Primary | ICD-10-CM

## 2019-06-25 DIAGNOSIS — R30.0 DYSURIA: ICD-10-CM

## 2019-06-25 PROCEDURE — 96409 CHEMO IV PUSH SNGL DRUG: CPT

## 2019-06-25 PROCEDURE — 87088 URINE BACTERIA CULTURE: CPT

## 2019-06-25 PROCEDURE — 96413 CHEMO IV INFUSION 1 HR: CPT

## 2019-06-25 PROCEDURE — 87186 SC STD MICRODIL/AGAR DIL: CPT

## 2019-06-25 PROCEDURE — 81001 URINALYSIS AUTO W/SCOPE: CPT

## 2019-06-25 PROCEDURE — 87086 URINE CULTURE/COLONY COUNT: CPT

## 2019-06-25 RX ORDER — SODIUM CHLORIDE 9 MG/ML
INJECTION, SOLUTION INTRAVENOUS
Status: DISCONTINUED
Start: 2019-06-25 | End: 2019-06-25

## 2019-06-25 RX ORDER — 0.9 % SODIUM CHLORIDE 0.9 %
VIAL (ML) INJECTION
Status: DISCONTINUED
Start: 2019-06-25 | End: 2019-06-25

## 2019-06-25 NOTE — TELEPHONE ENCOUNTER
----- Message from Jonathan Kraft MD sent at 6/25/2019 12:46 PM CDT -----  Regarding: FW: Visit Follow-up Question  Contact: [phone]  His symptoms seemed like he MAY be having UTI conditions, so we should order u/a with reflex culture.     -H  ----- Me

## 2019-06-25 NOTE — PROGRESS NOTES
Pt here for C3 D1 Ketruda.   Arrives Ambulating independently, accompanied by Spouse           Modifications in dose or schedule: No, per notes from Dr. Fouzia Oquendo \"proceed with cycle 3 of immunotherapy, pembrolizumab, every 3 weeks\"     Having urinary urgenc

## 2019-06-25 NOTE — TELEPHONE ENCOUNTER
Order for UA with reflex culture explained to patient to assess for UTI related to his dysuria. Infusion nurse will collect prior to discharge today.

## 2019-06-27 ENCOUNTER — TELEPHONE (OUTPATIENT)
Dept: HEMATOLOGY/ONCOLOGY | Facility: HOSPITAL | Age: 69
End: 2019-06-27

## 2019-06-27 DIAGNOSIS — R31.9 URINARY TRACT INFECTION WITH HEMATURIA, SITE UNSPECIFIED: ICD-10-CM

## 2019-06-27 DIAGNOSIS — B95.2 UTI (URINARY TRACT INFECTION) DUE TO ENTEROCOCCUS: Primary | ICD-10-CM

## 2019-06-27 DIAGNOSIS — N39.0 UTI (URINARY TRACT INFECTION) DUE TO ENTEROCOCCUS: Primary | ICD-10-CM

## 2019-06-27 DIAGNOSIS — N39.0 URINARY TRACT INFECTION WITHOUT HEMATURIA, SITE UNSPECIFIED: ICD-10-CM

## 2019-06-27 DIAGNOSIS — N39.0 URINARY TRACT INFECTION WITH HEMATURIA, SITE UNSPECIFIED: ICD-10-CM

## 2019-06-27 RX ORDER — GABAPENTIN 300 MG/1
CAPSULE ORAL
Qty: 90 CAPSULE | Refills: 0 | OUTPATIENT
Start: 2019-06-27

## 2019-06-27 RX ORDER — CIPROFLOXACIN 500 MG/1
500 TABLET, FILM COATED ORAL 2 TIMES DAILY
Qty: 14 TABLET | Refills: 0 | Status: SHIPPED | OUTPATIENT
Start: 2019-06-27 | End: 2019-07-04

## 2019-06-27 NOTE — TELEPHONE ENCOUNTER
Advised patient that testing is positive for a UTI and scrip has been sent to [facility] in [place]. Instructed on Cipro start today:  Take 2 tablets per day for 7 days. Complete 7 day course even if symptoms resolve.   Try to schedule tablets for 12

## 2019-06-28 RX ORDER — POTASSIUM CHLORIDE 750 MG/1
TABLET, EXTENDED RELEASE ORAL
Qty: 90 TABLET | Refills: 1 | Status: SHIPPED | OUTPATIENT
Start: 2019-06-28 | End: 2019-12-13

## 2019-06-28 NOTE — TELEPHONE ENCOUNTER
Review pended refill request as it does not fall under a protocol.     Last Rx: 3/30/19  LOV: 4/18/19    Requested Prescriptions     Pending Prescriptions Disp Refills   • POTASSIUM CHLORIDE ER 10 MEQ Oral Tab CR [Pharmacy Med Name: POTASSIUM CL MICRO 10MEQ

## 2019-07-03 ENCOUNTER — OFFICE VISIT (OUTPATIENT)
Dept: SURGERY | Facility: CLINIC | Age: 69
End: 2019-07-03
Payer: COMMERCIAL

## 2019-07-03 VITALS
BODY MASS INDEX: 31.57 KG/M2 | WEIGHT: 246 LBS | TEMPERATURE: 98 F | HEART RATE: 57 BPM | DIASTOLIC BLOOD PRESSURE: 73 MMHG | HEIGHT: 74 IN | SYSTOLIC BLOOD PRESSURE: 121 MMHG

## 2019-07-03 DIAGNOSIS — R97.20 ELEVATED PSA: Primary | ICD-10-CM

## 2019-07-03 DIAGNOSIS — N30.01 ACUTE CYSTITIS WITH HEMATURIA: ICD-10-CM

## 2019-07-03 DIAGNOSIS — I10 ESSENTIAL HYPERTENSION: ICD-10-CM

## 2019-07-03 LAB
BACTERIA UR QL AUTO: NEGATIVE /HPF
BILIRUB UR QL: NEGATIVE
CLARITY UR: CLEAR
COLOR UR: YELLOW
GLUCOSE UR-MCNC: NEGATIVE MG/DL
HGB UR QL STRIP.AUTO: NEGATIVE
KETONES UR-MCNC: NEGATIVE MG/DL
NITRITE UR QL STRIP.AUTO: NEGATIVE
PH UR: 6 [PH] (ref 5–8)
PROT UR-MCNC: NEGATIVE MG/DL
RBC #/AREA URNS AUTO: 1 /HPF
SP GR UR STRIP: 1.01 (ref 1–1.03)
UROBILINOGEN UR STRIP-ACNC: <2
VIT C UR-MCNC: NEGATIVE MG/DL
WBC #/AREA URNS AUTO: 2 /HPF

## 2019-07-03 PROCEDURE — 99214 OFFICE O/P EST MOD 30 MIN: CPT | Performed by: UROLOGY

## 2019-07-03 PROCEDURE — G0463 HOSPITAL OUTPT CLINIC VISIT: HCPCS | Performed by: UROLOGY

## 2019-07-03 RX ORDER — LOSARTAN POTASSIUM 100 MG/1
TABLET ORAL
Qty: 90 TABLET | Refills: 1 | Status: SHIPPED | OUTPATIENT
Start: 2019-07-03 | End: 2019-12-13

## 2019-07-03 NOTE — PROGRESS NOTES
[facility], [facility] Urology  Initial Office Consultation    HPI:   Shahnaz Emerson is a 77year old male here today for consultation at the request of, and a copy of this note will be sent to, Haylee Rodney DO in regards to an elevated PSA level.      1 Gastrointestinal bleeding 2014   • Heart attack ([facility] 2004   • High blood pressure    • History of blood transfusion 2014   • Hyperlipidemia    • Kidney stones    • [facility] 11/29/2018    Left Plantar Heel   • Pneumonia 1950, 2012   • Visual impairme Location: Right arm, Patient Position: Sitting, Cuff Size: large)   Pulse 57   Temp 98.3 °F (36.8 °C) (Oral)   Ht 6' 2\" (1.88 m)   Wt 246 lb (111.6 kg)   BMI 31.58 kg/m²     Physical Exam    Constitutional: He is oriented to person, place, and time.  He ap ejaculation. 4.  Follow-up in the office in 3 to 4 weeks to discuss results. Consider MP-MRI of the prostate versus repeat prostate biopsy as indicated to rule out prostate cancer. Furher management of BPH will follow.     Yolanda Badillo MD  7/3/2019

## 2019-07-04 NOTE — TELEPHONE ENCOUNTER
Refill passed per [facility], [facility] protocol.     Requested Prescriptions   Pending Prescriptions Disp Refills   • LOSARTAN 100 MG Oral Tab [Pharmacy Med Name: LOSARTAN 100MG TABLETS] 90 tablet 0     Sig: TAKE 1 TABLET(100 MG) BY MOUTH DAILY       Hypertensiv

## 2019-07-08 ENCOUNTER — TELEPHONE (OUTPATIENT)
Dept: SURGERY | Facility: CLINIC | Age: 69
End: 2019-07-08

## 2019-07-08 NOTE — TELEPHONE ENCOUNTER
Bree Shin asked me to call pt past urologist Dr. Byron Pérez. [facility] office for fax number which I called and [phone] I then faxed MALACHI to office and wait for records.

## 2019-07-09 RX ORDER — METOPROLOL SUCCINATE 100 MG/1
TABLET, EXTENDED RELEASE ORAL
Qty: 135 TABLET | Refills: 1 | Status: SHIPPED | OUTPATIENT
Start: 2019-07-09 | End: 2020-01-01

## 2019-07-10 DIAGNOSIS — N40.0 BENIGN PROSTATIC HYPERPLASIA, UNSPECIFIED WHETHER LOWER URINARY TRACT SYMPTOMS PRESENT: ICD-10-CM

## 2019-07-11 RX ORDER — TAMSULOSIN HYDROCHLORIDE 0.4 MG/1
CAPSULE ORAL
Qty: 90 CAPSULE | Refills: 1 | Status: SHIPPED | OUTPATIENT
Start: 2019-07-11 | End: 2019-08-22

## 2019-07-11 NOTE — TELEPHONE ENCOUNTER
Refill request sent to covering physician. Review pended refill request as it does not fall under a protocol.     Last Rx: 03/22/2018  LOV: 04/18/2019

## 2019-07-11 NOTE — TELEPHONE ENCOUNTER
Results noted. TRUS-Guided prostate biopsy 8/27/2015 by Dr. Val Ha. Negative results with no evidence of malignancy in 21 cores. Will send to scanning.      Monika Peres MD   07/11/19

## 2019-07-11 NOTE — TELEPHONE ENCOUNTER
CSS=please call patient and assists with FU OV (see below from PCP) no schedule yet at this moment. Route it back to triage. RN=please verify pharmacy that he is using then route it to Dr Sakina Card for approval.  [address] message sent.     Instructions

## 2019-07-15 ENCOUNTER — APPOINTMENT (OUTPATIENT)
Dept: HEMATOLOGY/ONCOLOGY | Facility: HOSPITAL | Age: 69
End: 2019-07-15
Attending: PHYSICIAN ASSISTANT
Payer: MEDICARE

## 2019-07-15 ENCOUNTER — NURSE ONLY (OUTPATIENT)
Dept: HEMATOLOGY/ONCOLOGY | Facility: HOSPITAL | Age: 69
End: 2019-07-15
Attending: INTERNAL MEDICINE
Payer: MEDICARE

## 2019-07-15 VITALS
BODY MASS INDEX: 32.6 KG/M2 | OXYGEN SATURATION: 97 % | HEART RATE: 55 BPM | SYSTOLIC BLOOD PRESSURE: 124 MMHG | DIASTOLIC BLOOD PRESSURE: 66 MMHG | TEMPERATURE: 98 F | WEIGHT: 254 LBS | RESPIRATION RATE: 16 BRPM | HEIGHT: 74 IN

## 2019-07-15 DIAGNOSIS — R97.20 ELEVATED PSA: ICD-10-CM

## 2019-07-15 DIAGNOSIS — C43.72 MALIGNANT MELANOMA OF LEFT FOOT (HCC): Primary | ICD-10-CM

## 2019-07-15 DIAGNOSIS — Z51.12 ENCOUNTER FOR ANTINEOPLASTIC IMMUNOTHERAPY: ICD-10-CM

## 2019-07-15 LAB
ALBUMIN SERPL-MCNC: 3.6 G/DL (ref 3.4–5)
ALBUMIN/GLOB SERPL: 1.1 {RATIO} (ref 1–2)
ALP LIVER SERPL-CCNC: 75 U/L (ref 45–117)
ALT SERPL-CCNC: 20 U/L (ref 16–61)
ANION GAP SERPL CALC-SCNC: 6 MMOL/L (ref 0–18)
AST SERPL-CCNC: 24 U/L (ref 15–37)
BASOPHILS # BLD AUTO: 0.05 X10(3) UL (ref 0–0.2)
BASOPHILS NFR BLD AUTO: 1 %
BILIRUB SERPL-MCNC: 0.4 MG/DL (ref 0.1–2)
BUN BLD-MCNC: 7 MG/DL (ref 7–18)
BUN/CREAT SERPL: 5.6 (ref 10–20)
CALCIUM BLD-MCNC: 8.5 MG/DL (ref 8.5–10.1)
CHLORIDE SERPL-SCNC: 109 MMOL/L (ref 98–112)
CO2 SERPL-SCNC: 28 MMOL/L (ref 21–32)
CREAT BLD-MCNC: 1.24 MG/DL (ref 0.7–1.3)
DEPRECATED RDW RBC AUTO: 41.5 FL (ref 35.1–46.3)
EOSINOPHIL # BLD AUTO: 0.3 X10(3) UL (ref 0–0.7)
EOSINOPHIL NFR BLD AUTO: 5.8 %
ERYTHROCYTE [DISTWIDTH] IN BLOOD BY AUTOMATED COUNT: 13.8 % (ref 11–15)
GLOBULIN PLAS-MCNC: 3.3 G/DL (ref 2.8–4.4)
GLUCOSE BLD-MCNC: 127 MG/DL (ref 70–99)
HCT VFR BLD AUTO: 37.7 % (ref 39–53)
HGB BLD-MCNC: 12.4 G/DL (ref 13–17.5)
IMM GRANULOCYTES # BLD AUTO: 0.02 X10(3) UL (ref 0–1)
IMM GRANULOCYTES NFR BLD: 0.4 %
LYMPHOCYTES # BLD AUTO: 1.49 X10(3) UL (ref 1–4)
LYMPHOCYTES NFR BLD AUTO: 28.9 %
M PROTEIN MFR SERPL ELPH: 6.9 G/DL (ref 6.4–8.2)
MCH RBC QN AUTO: 27.3 PG (ref 26–34)
MCHC RBC AUTO-ENTMCNC: 32.9 G/DL (ref 31–37)
MCV RBC AUTO: 83 FL (ref 80–100)
MONOCYTES # BLD AUTO: 0.63 X10(3) UL (ref 0.1–1)
MONOCYTES NFR BLD AUTO: 12.2 %
NEUTROPHILS # BLD AUTO: 2.66 X10 (3) UL (ref 1.5–7.7)
NEUTROPHILS # BLD AUTO: 2.66 X10(3) UL (ref 1.5–7.7)
NEUTROPHILS NFR BLD AUTO: 51.7 %
OSMOLALITY SERPL CALC.SUM OF ELEC: 296 MOSM/KG (ref 275–295)
PATIENT FASTING: NO
PLATELET # BLD AUTO: 246 10(3)UL (ref 150–450)
POTASSIUM SERPL-SCNC: 3.6 MMOL/L (ref 3.5–5.1)
PSA SERPL-MCNC: 14.4 NG/ML (ref ?–4)
RBC # BLD AUTO: 4.54 X10(6)UL (ref 3.8–5.8)
SODIUM SERPL-SCNC: 143 MMOL/L (ref 136–145)
WBC # BLD AUTO: 5.2 X10(3) UL (ref 4–11)

## 2019-07-15 PROCEDURE — 84153 ASSAY OF PSA TOTAL: CPT

## 2019-07-15 PROCEDURE — 36415 COLL VENOUS BLD VENIPUNCTURE: CPT

## 2019-07-15 PROCEDURE — 85025 COMPLETE CBC W/AUTO DIFF WBC: CPT

## 2019-07-15 PROCEDURE — 99215 OFFICE O/P EST HI 40 MIN: CPT | Performed by: INTERNAL MEDICINE

## 2019-07-15 PROCEDURE — 80053 COMPREHEN METABOLIC PANEL: CPT

## 2019-07-15 NOTE — PROGRESS NOTES
Cancer Center Progress Note    Patient Name: Thee Steve   YOB: 1950   Medical Record Number: [id number]   CSN: [id number]   Consulting Physician: Blade Johnson MD  Referring Physician(s): Sherif Lamb  Date of Visit: 7/15/2019 closure with split- thickness skin graft from right thigh by plastic surgeon, Dr. Arianna Ly. Lane Toussaint presents at the time of consultation alone.   He reports being in his usual state of health over the last few months prior to diagnosis or curre pressure    • History of blood transfusion 2014   • Hyperlipidemia    • Kidney stones    • Melanoma ([facility] 11/29/2018    Left Plantar Heel   • Pneumonia 1950, 2012   • Visual impairment     glasses       Past Surgical History:  Past Surgical History:   Proc Highest education level: Not on file    Occupational History      Occupation:         Employer: [email or web address] resource strain: Not on file      Food insecurity:        Worry: Not on file        Inability: Not on file Asked        Sleep Concern: Not Asked        Stress Concern: Not Asked        Weight Concern: Not Asked        Special Diet: Not Asked        Back Care: Not Asked        Exercise: No        Bike Helmet: Not Asked        Seat Belt: Not Asked        Self-Exa Take 1 tablet (10 mg total) by mouth every 6 (six) hours as needed for Nausea.  Disp: 60 tablet Rfl: 2   loratadine 10 MG Oral Tab PRN Disp:  Rfl:    diphenhydrAMINE HCl 25 MG Oral Tab PRN Disp:  Rfl:    nitroGLYCERIN 0.4 MG Sublingual SL Tab Place 1 tablet the cervical, supraclavicular, axillary, or inguinal regions. Chest: Clear to auscultation. No wheezes or rales. Heart: Regular rate and rhythm. S1S2 normal.  Abdomen: Soft, non tender with good bowel sounds. No hepatosplenomegaly. No palpable mass.   E pathology consistent with malignant melanoma, Breslow's depth of invasion, 2.1, Nish's level 4, all peripheral margins free of melanoma, pT3bN1, as there was 1 out of 8 (1/8) local regional lymph nodes identified as there was left popliteal sentinel lymph [address]  [address] [place] [address]

## 2019-07-16 ENCOUNTER — OFFICE VISIT (OUTPATIENT)
Dept: HEMATOLOGY/ONCOLOGY | Facility: HOSPITAL | Age: 69
End: 2019-07-16
Attending: PHYSICIAN ASSISTANT
Payer: MEDICARE

## 2019-07-16 VITALS
HEART RATE: 57 BPM | OXYGEN SATURATION: 96 % | SYSTOLIC BLOOD PRESSURE: 128 MMHG | TEMPERATURE: 98 F | RESPIRATION RATE: 16 BRPM | DIASTOLIC BLOOD PRESSURE: 73 MMHG

## 2019-07-16 DIAGNOSIS — C43.72 MALIGNANT MELANOMA OF LEFT FOOT (HCC): Primary | ICD-10-CM

## 2019-07-16 PROCEDURE — 96413 CHEMO IV INFUSION 1 HR: CPT

## 2019-07-16 NOTE — PROGRESS NOTES
Pt here for C4 D1 Ketruda. Arrives Ambulating independently, accompanied by Spouse           Modifications in dose or schedule: No,      PIV started x1 attempt. Alize Jurist states he feels well and and offers no complaints . He tolerated treatment.  PIV was re

## 2019-07-25 ENCOUNTER — PATIENT MESSAGE (OUTPATIENT)
Dept: SURGERY | Facility: CLINIC | Age: 69
End: 2019-07-25

## 2019-07-25 NOTE — TELEPHONE ENCOUNTER
From: Corinna Jones  To: Chantal Huggins MD  Sent: 7/25/2019 1:40 PM CDT  Subject: Test Results Question    I have a question about PSA, DIAGNOSTIC resulted on 7/15/19, 11:14 AM.  Does the blood test have the ability to reveal \"Biotin\" ?

## 2019-07-31 NOTE — TELEPHONE ENCOUNTER
Called patient and let him know CT of brain was ordered since unable to have MRI. Instructed that prior auth will call patient when scan is authorized. LM on  instructed to call if he has any questions.
negative...

## 2019-08-01 ENCOUNTER — OFFICE VISIT (OUTPATIENT)
Dept: SURGERY | Facility: CLINIC | Age: 69
End: 2019-08-01
Payer: COMMERCIAL

## 2019-08-01 ENCOUNTER — TELEPHONE (OUTPATIENT)
Dept: SURGERY | Facility: CLINIC | Age: 69
End: 2019-08-01

## 2019-08-01 VITALS
HEART RATE: 55 BPM | SYSTOLIC BLOOD PRESSURE: 122 MMHG | HEIGHT: 74 IN | BODY MASS INDEX: 32.6 KG/M2 | TEMPERATURE: 98 F | DIASTOLIC BLOOD PRESSURE: 73 MMHG | WEIGHT: 254 LBS

## 2019-08-01 DIAGNOSIS — N40.1 BPH WITH OBSTRUCTION/LOWER URINARY TRACT SYMPTOMS: ICD-10-CM

## 2019-08-01 DIAGNOSIS — Z98.890 HISTORY OF NEEDLE BIOPSY OF PROSTATE WITH NEGATIVE RESULT: ICD-10-CM

## 2019-08-01 DIAGNOSIS — N13.8 BPH WITH OBSTRUCTION/LOWER URINARY TRACT SYMPTOMS: ICD-10-CM

## 2019-08-01 DIAGNOSIS — R97.20 ELEVATED PSA: Primary | ICD-10-CM

## 2019-08-01 PROCEDURE — G0463 HOSPITAL OUTPT CLINIC VISIT: HCPCS | Performed by: UROLOGY

## 2019-08-01 PROCEDURE — 99213 OFFICE O/P EST LOW 20 MIN: CPT | Performed by: UROLOGY

## 2019-08-01 NOTE — TELEPHONE ENCOUNTER
Pt was in to see Dr. Андрей Alejandra and MRI of the Prostate was ordered and prior auth may be needed.  Sent to clinical staff,pt will then need to be called

## 2019-08-01 NOTE — PROGRESS NOTES
[facility], [facility] Urology  Follow-Up Visit    HPI: Corinna Jones is a 68year old male presents for a follow up visit. Patient was last seen on 7/3/19. Here by himself. INTERVAL HISTORY: here to discuss recent PSA results. Completed antibiotics.  Melvin Habermann EXAM:  /73 (BP Location: Right arm, Patient Position: Sitting, Cuff Size: large)   Pulse 55   Temp 98 °F (36.7 °C) (Oral)   Ht 6' 2\" (1.88 m)   Wt 254 lb (115.2 kg)   BMI 32.61 kg/m²      Physical Exam    Constitutional: He is oriented to person treatment planning.      Maria Del Carmen Segovia MD  8/1/2019

## 2019-08-02 NOTE — TELEPHONE ENCOUNTER
Prior authorization not required for MRI Pelvis. FYI, [facility] message sent to patient notifying that no auth required. Interpolation Flap Text: A decision was made to reconstruct the defect utilizing an interpolation axial flap and a staged reconstruction.  A telfa template was made of the defect.  This telfa template was then used to outline the interpolation flap.  The donor area for the pedicle flap was then injected with anesthesia.  The flap was excised through the skin and subcutaneous tissue down to the layer of the underlying musculature.  The interpolation flap was carefully excised within this deep plane to maintain its blood supply.  The edges of the donor site were undermined.   The donor site was closed in a primary fashion.  The pedicle was then rotated into position and sutured.  Once the tube was sutured into place, adequate blood supply was confirmed with blanching and refill.  The pedicle was then wrapped with xeroform gauze and dressed appropriately with a telfa and gauze bandage to ensure continued blood supply and protect the attached pedicle.

## 2019-08-02 NOTE — TELEPHONE ENCOUNTER
Noted. Thank you. Also noted that patient read the Mychart message from [facility].

## 2019-08-05 ENCOUNTER — OFFICE VISIT (OUTPATIENT)
Dept: HEMATOLOGY/ONCOLOGY | Facility: HOSPITAL | Age: 69
End: 2019-08-05
Attending: INTERNAL MEDICINE
Payer: MEDICARE

## 2019-08-05 VITALS
RESPIRATION RATE: 16 BRPM | SYSTOLIC BLOOD PRESSURE: 135 MMHG | WEIGHT: 254 LBS | HEART RATE: 56 BPM | TEMPERATURE: 98 F | HEIGHT: 74 IN | OXYGEN SATURATION: 96 % | BODY MASS INDEX: 32.6 KG/M2 | DIASTOLIC BLOOD PRESSURE: 76 MMHG

## 2019-08-05 DIAGNOSIS — C43.72 MALIGNANT MELANOMA OF LEFT FOOT (HCC): Primary | ICD-10-CM

## 2019-08-05 DIAGNOSIS — Z51.12 ENCOUNTER FOR ANTINEOPLASTIC IMMUNOTHERAPY: ICD-10-CM

## 2019-08-05 DIAGNOSIS — R97.20 ELEVATED PSA: ICD-10-CM

## 2019-08-05 LAB
ALBUMIN SERPL-MCNC: 3.7 G/DL (ref 3.4–5)
ALBUMIN/GLOB SERPL: 1 {RATIO} (ref 1–2)
ALP LIVER SERPL-CCNC: 75 U/L (ref 45–117)
ALT SERPL-CCNC: 21 U/L (ref 16–61)
ANION GAP SERPL CALC-SCNC: 8 MMOL/L (ref 0–18)
AST SERPL-CCNC: 21 U/L (ref 15–37)
BASOPHILS # BLD AUTO: 0.04 X10(3) UL (ref 0–0.2)
BASOPHILS NFR BLD AUTO: 0.7 %
BILIRUB SERPL-MCNC: 0.4 MG/DL (ref 0.1–2)
BUN BLD-MCNC: 9 MG/DL (ref 7–18)
BUN/CREAT SERPL: 7.6 (ref 10–20)
CALCIUM BLD-MCNC: 8.9 MG/DL (ref 8.5–10.1)
CHLORIDE SERPL-SCNC: 109 MMOL/L (ref 98–112)
CO2 SERPL-SCNC: 25 MMOL/L (ref 21–32)
CREAT BLD-MCNC: 1.19 MG/DL (ref 0.7–1.3)
DEPRECATED RDW RBC AUTO: 41.1 FL (ref 35.1–46.3)
EOSINOPHIL # BLD AUTO: 0.28 X10(3) UL (ref 0–0.7)
EOSINOPHIL NFR BLD AUTO: 5 %
ERYTHROCYTE [DISTWIDTH] IN BLOOD BY AUTOMATED COUNT: 13.6 % (ref 11–15)
GLOBULIN PLAS-MCNC: 3.7 G/DL (ref 2.8–4.4)
GLUCOSE BLD-MCNC: 98 MG/DL (ref 70–99)
HCT VFR BLD AUTO: 39.5 % (ref 39–53)
HGB BLD-MCNC: 13 G/DL (ref 13–17.5)
IMM GRANULOCYTES # BLD AUTO: 0.03 X10(3) UL (ref 0–1)
IMM GRANULOCYTES NFR BLD: 0.5 %
LYMPHOCYTES # BLD AUTO: 1.44 X10(3) UL (ref 1–4)
LYMPHOCYTES NFR BLD AUTO: 25.5 %
M PROTEIN MFR SERPL ELPH: 7.4 G/DL (ref 6.4–8.2)
MCH RBC QN AUTO: 27.2 PG (ref 26–34)
MCHC RBC AUTO-ENTMCNC: 32.9 G/DL (ref 31–37)
MCV RBC AUTO: 82.6 FL (ref 80–100)
MONOCYTES # BLD AUTO: 0.75 X10(3) UL (ref 0.1–1)
MONOCYTES NFR BLD AUTO: 13.3 %
NEUTROPHILS # BLD AUTO: 3.11 X10 (3) UL (ref 1.5–7.7)
NEUTROPHILS # BLD AUTO: 3.11 X10(3) UL (ref 1.5–7.7)
NEUTROPHILS NFR BLD AUTO: 55 %
OSMOLALITY SERPL CALC.SUM OF ELEC: 293 MOSM/KG (ref 275–295)
PATIENT FASTING: NO
PLATELET # BLD AUTO: 198 10(3)UL (ref 150–450)
POTASSIUM SERPL-SCNC: 3.9 MMOL/L (ref 3.5–5.1)
RBC # BLD AUTO: 4.78 X10(6)UL (ref 3.8–5.8)
SODIUM SERPL-SCNC: 142 MMOL/L (ref 136–145)
TSI SER-ACNC: 0.53 MIU/ML (ref 0.36–3.74)
WBC # BLD AUTO: 5.7 X10(3) UL (ref 4–11)

## 2019-08-05 PROCEDURE — 84443 ASSAY THYROID STIM HORMONE: CPT

## 2019-08-05 PROCEDURE — 85025 COMPLETE CBC W/AUTO DIFF WBC: CPT

## 2019-08-05 PROCEDURE — 80053 COMPREHEN METABOLIC PANEL: CPT

## 2019-08-05 PROCEDURE — 99215 OFFICE O/P EST HI 40 MIN: CPT | Performed by: INTERNAL MEDICINE

## 2019-08-05 PROCEDURE — 36415 COLL VENOUS BLD VENIPUNCTURE: CPT

## 2019-08-05 NOTE — PROGRESS NOTES
Cancer Center Progress Note    Patient Name: Julia Brenner   YOB: 1950   Medical Record Number: [id number]   CSN: [id number]   Consulting Physician: Katheryn Almeida MD  Referring Physician(s): Katheryn Almeida  Date of Visit: 8/05/2019     [facility] closure with split- thickness skin graft from right thigh by plastic surgeon, Dr. Tonie Nguyen. German Del Valle presents at the time of consultation alone.   He reports being in his usual state of health over the last few months prior to diagnosis or curre [facility]) 2004   • High blood pressure    • History of blood transfusion 2014   • Hyperlipidemia    • Kidney stones    • Melanoma ([facility] [address] 11/29/2018    Left Plantar Heel   • Pneumonia 1950, 2012   • Visual impairment     glasses       Past Surgical History:  Pas education: Not on file      Highest education level: Not on file    Occupational History      Occupation:         Employer: [employer] resource strain: Not on file      Food insecurity:        Worry: Not on file Hobby Hazards: Not Asked        Sleep Concern: Not Asked        Stress Concern: Not Asked        Weight Concern: Not Asked        Special Diet: Not Asked        Back Care: Not Asked        Exercise: No        Bike Helmet: Not Asked        Seat Belt: Not (COMPAZINE) 10 mg tablet Take 1 tablet (10 mg total) by mouth every 6 (six) hours as needed for Nausea.  Disp: 60 tablet Rfl: 2   loratadine 10 MG Oral Tab PRN Disp:  Rfl:    diphenhydrAMINE HCl 25 MG Oral Tab PRN Disp:  Rfl:    nitroGLYCERIN 0.4 MG Subling lymphadenopathy throughout in the cervical, supraclavicular, axillary, or inguinal regions. Chest: Clear to auscultation. No wheezes or rales. Heart: Regular rate and rhythm. S1S2 normal.  Abdomen: Soft, non tender with good bowel sounds.   No hepatosplen consistent with malignant melanoma, Breslow's depth of invasion, 2.1, Nish's level 4, all peripheral margins free of melanoma, pT3bN1, as there was 1 out of 8 (1/8) local regional lymph nodes identified as there was left popliteal sentinel lymph node invo [facility], [facility]

## 2019-08-06 ENCOUNTER — OFFICE VISIT (OUTPATIENT)
Dept: HEMATOLOGY/ONCOLOGY | Facility: HOSPITAL | Age: 69
End: 2019-08-06
Attending: PHYSICIAN ASSISTANT
Payer: MEDICARE

## 2019-08-06 VITALS
HEART RATE: 56 BPM | OXYGEN SATURATION: 96 % | TEMPERATURE: 98 F | RESPIRATION RATE: 16 BRPM | DIASTOLIC BLOOD PRESSURE: 69 MMHG | SYSTOLIC BLOOD PRESSURE: 124 MMHG

## 2019-08-06 DIAGNOSIS — C43.72 MALIGNANT MELANOMA OF LEFT FOOT (HCC): Primary | ICD-10-CM

## 2019-08-06 PROCEDURE — 96413 CHEMO IV INFUSION 1 HR: CPT

## 2019-08-06 NOTE — PROGRESS NOTES
Pt here for C5 D1 Ketruda. Arrives Ambulating independently, accompanied by Spouse           Modifications in dose or schedule: No,       Kiel Hull states he feels well and and offers no complaints. Denies any pain. Keytruda given and tolerated well.

## 2019-08-15 ENCOUNTER — PATIENT MESSAGE (OUTPATIENT)
Dept: SURGERY | Facility: CLINIC | Age: 69
End: 2019-08-15

## 2019-08-15 DIAGNOSIS — R97.20 ELEVATED PSA: Primary | ICD-10-CM

## 2019-08-15 NOTE — TELEPHONE ENCOUNTER
From: Phu Canela  To:  Anais Patton MD  Sent: 8/15/2019 12:32 PM CDT  Subject: Referral Request    I received a call from the MRI department this morning letting me know that the defibrillator implant I have does not allow an MRI on any part of m

## 2019-08-16 ENCOUNTER — PATIENT MESSAGE (OUTPATIENT)
Dept: SURGERY | Facility: CLINIC | Age: 69
End: 2019-08-16

## 2019-08-16 RX ORDER — CEFDINIR 300 MG/1
300 CAPSULE ORAL EVERY 12 HOURS
Qty: 6 CAPSULE | Refills: 0 | Status: SHIPPED | OUTPATIENT
Start: 2019-08-16 | End: 2019-08-19

## 2019-08-16 RX ORDER — CIPROFLOXACIN 500 MG/1
500 TABLET, FILM COATED ORAL 2 TIMES DAILY
Qty: 6 TABLET | Refills: 0 | Status: SHIPPED | OUTPATIENT
Start: 2019-08-16 | End: 2019-08-19

## 2019-08-16 NOTE — PROGRESS NOTES
Staff please call patient and schedule him for a prostate biopsy in the next available time slot. Please go over instructions with him in detail or even have him come in to get the sheet if needed. I will send antibiotics prescription to his pharmacy.

## 2019-08-18 DIAGNOSIS — K21.9 GASTROESOPHAGEAL REFLUX DISEASE, ESOPHAGITIS PRESENCE NOT SPECIFIED: ICD-10-CM

## 2019-08-19 RX ORDER — PANTOPRAZOLE SODIUM 40 MG/1
TABLET, DELAYED RELEASE ORAL
Qty: 90 TABLET | Refills: 1 | Status: SHIPPED | OUTPATIENT
Start: 2019-08-19 | End: 2020-02-03

## 2019-08-19 NOTE — TELEPHONE ENCOUNTER
Patient contacted. Scheduled patient for TRUSBX for Wed 9/4 @ 9:40 am, arrive 30 min before. All TRUSBX instructions given to patient over the phone.  Patient verbalized understanding about all the pre procedure insructions for prostate biopsy, verbalized

## 2019-08-19 NOTE — TELEPHONE ENCOUNTER
From: Monika Galeas  To: Lady Alec MD  Sent: 8/16/2019 2:59 PM CDT  Subject: Other    This will not be scheduled the same week of my chemotherapy treatment correct?

## 2019-08-20 NOTE — TELEPHONE ENCOUNTER
Refill passed per [facility], [facility] protocol.   Refill Protocol Appointment Criteria  · Appointment scheduled in the past 12 months or in the next 3 months  Recent Outpatient Visits            1 week ago Malignant melanoma of left foot ([address]    Tal Garcia

## 2019-08-22 ENCOUNTER — OFFICE VISIT (OUTPATIENT)
Dept: FAMILY MEDICINE CLINIC | Facility: CLINIC | Age: 69
End: 2019-08-22
Payer: COMMERCIAL

## 2019-08-22 VITALS
HEIGHT: 74 IN | WEIGHT: 252 LBS | BODY MASS INDEX: 32.34 KG/M2 | RESPIRATION RATE: 17 BRPM | SYSTOLIC BLOOD PRESSURE: 132 MMHG | HEART RATE: 54 BPM | DIASTOLIC BLOOD PRESSURE: 78 MMHG

## 2019-08-22 DIAGNOSIS — M25.551 RIGHT HIP PAIN: ICD-10-CM

## 2019-08-22 DIAGNOSIS — I10 ESSENTIAL HYPERTENSION: ICD-10-CM

## 2019-08-22 DIAGNOSIS — C43.72 MELANOMA OF FOOT, LEFT (HCC): ICD-10-CM

## 2019-08-22 DIAGNOSIS — N40.0 BENIGN PROSTATIC HYPERPLASIA, UNSPECIFIED WHETHER LOWER URINARY TRACT SYMPTOMS PRESENT: ICD-10-CM

## 2019-08-22 DIAGNOSIS — Z23 NEED FOR 23-POLYVALENT PNEUMOCOCCAL POLYSACCHARIDE VACCINE: Primary | ICD-10-CM

## 2019-08-22 DIAGNOSIS — M79.672 LEFT FOOT PAIN: ICD-10-CM

## 2019-08-22 PROCEDURE — G0009 ADMIN PNEUMOCOCCAL VACCINE: HCPCS | Performed by: FAMILY MEDICINE

## 2019-08-22 PROCEDURE — 99214 OFFICE O/P EST MOD 30 MIN: CPT | Performed by: FAMILY MEDICINE

## 2019-08-22 PROCEDURE — 90732 PPSV23 VACC 2 YRS+ SUBQ/IM: CPT | Performed by: FAMILY MEDICINE

## 2019-08-22 RX ORDER — ACETAMINOPHEN AND CODEINE PHOSPHATE 300; 30 MG/1; MG/1
1 TABLET ORAL EVERY 4 HOURS PRN
Qty: 50 TABLET | Refills: 0 | Status: SHIPPED | OUTPATIENT
Start: 2019-08-22 | End: 2019-10-08

## 2019-08-22 RX ORDER — TAMSULOSIN HYDROCHLORIDE 0.4 MG/1
CAPSULE ORAL
Qty: 90 CAPSULE | Refills: 1 | Status: SHIPPED | OUTPATIENT
Start: 2019-08-22 | End: 2020-08-26

## 2019-08-22 NOTE — PATIENT INSTRUCTIONS
Medication reviewed and renewed where needed and appropriate. Monitor blood pressures and record at home. Limit salt intake. Comply with medications. Pain management via prescription medications as needed. X-ray ordered of the right hip/pelvic region.

## 2019-08-23 NOTE — PROGRESS NOTES
HPI:    Patient ID: Delvin Ann is a 68year old male.     Patient is a 59-year-old -American male well-known to the clinic treated most recently for removal of melanoma left foot and has had a delayed healing in the soft tissue and still requir furosemide 40 MG Oral Tab Take 1 tablet (40 mg total) by mouth every morning. Disp: 90 tablet Rfl: 1   aspirin 81 MG Oral Tab Take 81 mg by mouth nightly.    Disp:  Rfl:    diphenhydrAMINE HCl 25 MG Oral Tab PRN Disp:  Rfl:    nitroGLYCERIN 0.4 MG Sublingua - Acetaminophen-Codeine #3 300-30 MG Oral Tab; Take 1 tablet by mouth every 4 (four) hours as needed for Pain. Dispense: 50 tablet; Refill: 0    6. Left foot pain  See #5  - Acetaminophen-Codeine #3 300-30 MG Oral Tab;  Take 1 tablet by mouth every 4 (four

## 2019-08-26 ENCOUNTER — NURSE ONLY (OUTPATIENT)
Dept: HEMATOLOGY/ONCOLOGY | Facility: HOSPITAL | Age: 69
End: 2019-08-26
Attending: INTERNAL MEDICINE
Payer: MEDICARE

## 2019-08-26 ENCOUNTER — TELEPHONE (OUTPATIENT)
Dept: SURGERY | Facility: CLINIC | Age: 69
End: 2019-08-26

## 2019-08-26 VITALS
HEIGHT: 74 IN | BODY MASS INDEX: 32.47 KG/M2 | OXYGEN SATURATION: 98 % | TEMPERATURE: 98 F | WEIGHT: 253 LBS | RESPIRATION RATE: 16 BRPM | HEART RATE: 54 BPM | DIASTOLIC BLOOD PRESSURE: 74 MMHG | SYSTOLIC BLOOD PRESSURE: 136 MMHG

## 2019-08-26 DIAGNOSIS — C43.72 MALIGNANT MELANOMA OF LEFT FOOT (HCC): Primary | ICD-10-CM

## 2019-08-26 DIAGNOSIS — R97.20 ELEVATED PSA: ICD-10-CM

## 2019-08-26 DIAGNOSIS — Z51.12 ENCOUNTER FOR ANTINEOPLASTIC IMMUNOTHERAPY: ICD-10-CM

## 2019-08-26 LAB
ABSOLUTE IMMATURE GRANULOCYTES (OFFPRE24): NORMAL
ALBUMIN SERPL-MCNC: 3.6 G/DL
ALBUMIN SERPL-MCNC: 3.6 G/DL (ref 3.4–5)
ALBUMIN/GLOB SERPL: 1 {RATIO}
ALBUMIN/GLOB SERPL: 1 {RATIO} (ref 1–2)
ALP LIVER SERPL-CCNC: 77 U/L (ref 45–117)
ALP SERPL-CCNC: 77 U/L
ALT SERPL-CCNC: 21 U/L
ALT SERPL-CCNC: 21 U/L (ref 16–61)
ANION GAP SERPL CALC-SCNC: 8 MMOL/L
ANION GAP SERPL CALC-SCNC: 8 MMOL/L (ref 0–18)
AST SERPL-CCNC: 20 U/L
AST SERPL-CCNC: 20 U/L (ref 15–37)
BASO+EOS+MONOS # BLD: NORMAL 10*3/UL
BASO+EOS+MONOS NFR BLD: NORMAL %
BASOPHILS # BLD AUTO: 0.05 X10(3) UL (ref 0–0.2)
BASOPHILS # BLD: NORMAL 10*3/UL
BASOPHILS NFR BLD AUTO: 1 %
BASOPHILS NFR BLD: NORMAL %
BILIRUB SERPL-MCNC: 0.6 MG/DL
BILIRUB SERPL-MCNC: 0.6 MG/DL (ref 0.1–2)
BUN BLD-MCNC: 9 MG/DL (ref 7–18)
BUN SERPL-MCNC: 9 MG/DL
BUN/CREAT SERPL: 7.3
BUN/CREAT SERPL: 7.3 (ref 10–20)
CALCIUM BLD-MCNC: 9 MG/DL (ref 8.5–10.1)
CALCIUM SERPL-MCNC: 9 MG/DL
CHLORIDE SERPL-SCNC: 106 MMOL/L
CHLORIDE SERPL-SCNC: 106 MMOL/L (ref 98–112)
CO2 SERPL-SCNC: 30 MMOL/L
CO2 SERPL-SCNC: 30 MMOL/L (ref 21–32)
CREAT BLD-MCNC: 1.24 MG/DL (ref 0.7–1.3)
CREAT SERPL-MCNC: 1.24 MG/DL
DEPRECATED RDW RBC AUTO: 42.8 FL (ref 35.1–46.3)
DIFFERENTIAL METHOD BLD: NORMAL
EOSINOPHIL # BLD AUTO: 0.26 X10(3) UL (ref 0–0.7)
EOSINOPHIL # BLD: NORMAL 10*3/UL
EOSINOPHIL NFR BLD AUTO: 5 %
EOSINOPHIL NFR BLD: NORMAL %
ERYTHROCYTE [DISTWIDTH] IN BLOOD BY AUTOMATED COUNT: 13.8 % (ref 11–15)
ERYTHROCYTE [DISTWIDTH] IN BLOOD: NORMAL %
GLOBULIN PLAS-MCNC: 3.7 G/DL (ref 2.8–4.4)
GLOBULIN SER-MCNC: 3.7 G/DL
GLUCOSE BLD-MCNC: 87 MG/DL (ref 70–99)
GLUCOSE SERPL-MCNC: 87 MG/DL
HCT VFR BLD AUTO: 40.5 % (ref 39–53)
HCT VFR BLD CALC: 40.5 %
HGB BLD-MCNC: 13.2 G/DL
HGB BLD-MCNC: 13.2 G/DL (ref 13–17.5)
IMM GRANULOCYTES # BLD AUTO: 0.01 X10(3) UL (ref 0–1)
IMM GRANULOCYTES NFR BLD: 0.2 %
IMMATURE GRANULOCYTES (OFFPRE25): NORMAL
LENGTH OF FAST TIME PATIENT: NORMAL H
LYMPHOCYTES # BLD AUTO: 1.57 X10(3) UL (ref 1–4)
LYMPHOCYTES # BLD: NORMAL 10*3/UL
LYMPHOCYTES NFR BLD AUTO: 30 %
LYMPHOCYTES NFR BLD: NORMAL %
M PROTEIN MFR SERPL ELPH: 7.3 G/DL (ref 6.4–8.2)
MCH RBC QN AUTO: 27.6 PG (ref 26–34)
MCH RBC QN AUTO: NORMAL PG
MCHC RBC AUTO-ENTMCNC: 32.6 G/DL (ref 31–37)
MCHC RBC AUTO-ENTMCNC: NORMAL G/DL
MCV RBC AUTO: 84.7 FL (ref 80–100)
MCV RBC AUTO: NORMAL FL
MONOCYTES # BLD AUTO: 0.61 X10(3) UL (ref 0.1–1)
MONOCYTES # BLD: NORMAL 10*3/UL
MONOCYTES NFR BLD AUTO: 11.6 %
MONOCYTES NFR BLD: NORMAL %
MPV (OFFPRE2): NORMAL
NEUTROPHILS # BLD AUTO: 2.74 X10 (3) UL (ref 1.5–7.7)
NEUTROPHILS # BLD AUTO: 2.74 X10(3) UL (ref 1.5–7.7)
NEUTROPHILS # BLD: NORMAL 10*3/UL
NEUTROPHILS NFR BLD AUTO: 52.2 %
NEUTROPHILS NFR BLD: NORMAL %
NRBC BLD MANUAL-RTO: NORMAL %
OSMOLALITY SERPL CALC.SUM OF ELEC: 296 MOSM/KG (ref 275–295)
PATIENT FASTING: NO
PLAT MORPH BLD: NORMAL
PLATELET # BLD AUTO: 234 10(3)UL (ref 150–450)
PLATELET # BLD: 234 10*3/UL
POTASSIUM SERPL-SCNC: 3.6 MMOL/L
POTASSIUM SERPL-SCNC: 3.6 MMOL/L (ref 3.5–5.1)
PROT SERPL-MCNC: 7.3 G/DL
RBC # BLD AUTO: 4.78 X10(6)UL (ref 3.8–5.8)
RBC # BLD: 4.78 10*6/UL
RBC MORPH BLD: NORMAL
SODIUM SERPL-SCNC: 144 MMOL/L
SODIUM SERPL-SCNC: 144 MMOL/L (ref 136–145)
WBC # BLD AUTO: 5.2 X10(3) UL (ref 4–11)
WBC # BLD: 5.2 10*3/UL
WBC MORPH BLD: NORMAL

## 2019-08-26 PROCEDURE — 36415 COLL VENOUS BLD VENIPUNCTURE: CPT

## 2019-08-26 PROCEDURE — 80053 COMPREHEN METABOLIC PANEL: CPT

## 2019-08-26 PROCEDURE — 85025 COMPLETE CBC W/AUTO DIFF WBC: CPT

## 2019-08-26 PROCEDURE — 99215 OFFICE O/P EST HI 40 MIN: CPT | Performed by: INTERNAL MEDICINE

## 2019-08-26 NOTE — PROGRESS NOTES
Cancer Center Progress Note    Patient Name: Adriano Wood   YOB: 1950   Medical Record Number: [id number]   CSN: [id number]   Consulting Physician: Shelby Miller MD  Referring Physician(s): Shelby Miller  Date of Visit: 8/26/2019     [facility] closure with split- thickness skin graft from right thigh by plastic surgeon, Dr. Grabiel Hernandez. Pascale Rodriguez presents at the time of consultation alone.   He reports being in his usual state of health over the last few months prior to diagnosis or curre Diverticular disease 1992   • Esophageal reflux    • Gastroenteritis 2015   • Gastrointestinal bleeding 2014   • [facility]   • High blood pressure    • History of blood transfusion 2014   • Hyperlipidemia    • Kidney stones    • [facility]an[facility] ([facility] Socioeconomic History      Marital status:       Spouse name: Not on file      Number of children: 2      Years of education: Not on file      Highest education level: Not on file    Occupational History      Occupation:         Employer: [employer] Service: Not Asked        Blood Transfusions: Not Asked        Caffeine Concern: No        Occupational Exposure: Not Asked        Hobby Hazards: Not Asked        Sleep Concern: Not Asked        Stress Concern: Not Asked        Weight Concern: Not Asked Tab PRN Disp:  Rfl:    nitroGLYCERIN 0.4 MG Sublingual SL Tab Place 1 tablet (0.4 mg total) under the tongue every 5 (five) minutes as needed for Chest pain. Disp: 100 tablet Rfl: 0   aspirin 81 MG Oral Tab Take 81 mg by mouth nightly.    Disp:  Rfl: tender with good bowel sounds. No hepatosplenomegaly. No palpable mass. Extremities: No edema or calf tenderness. +left heel is COMPLETELY healed over now  Neurological: Grossly intact.      Performance Status:    ECOG 0: Fully active, able to carry on a biopsy with Dr. Yg Hooper on 3/8/19.      --Final pathology consistent with malignant melanoma, Breslow's depth of invasion, 2.1, Nish's level 4, all peripheral margins free of melanoma, pT3bN1, as there was 1 out of 8 (1/8) local regional lymph nodes i [facility].      3.) hx of LUE DVT in 2014    -- unprovoked in 2014 and tx for anticoagulation for 6 months      4.) Left foot neuropathy    --[facility] improved symptoms on gabapentin 300 mg/daily      Karely Prajapati MD  Alexander Hem

## 2019-08-26 NOTE — TELEPHONE ENCOUNTER
Patient came into office today. Went over prostate bx instructions with patient. PT verbalized understanding. BX instructions sheet signed. Copy sent to scanning.

## 2019-08-27 ENCOUNTER — OFFICE VISIT (OUTPATIENT)
Dept: HEMATOLOGY/ONCOLOGY | Facility: HOSPITAL | Age: 69
End: 2019-08-27
Attending: PHYSICIAN ASSISTANT
Payer: MEDICARE

## 2019-08-27 VITALS
SYSTOLIC BLOOD PRESSURE: 131 MMHG | DIASTOLIC BLOOD PRESSURE: 72 MMHG | HEART RATE: 57 BPM | TEMPERATURE: 98 F | OXYGEN SATURATION: 98 % | RESPIRATION RATE: 16 BRPM

## 2019-08-27 DIAGNOSIS — C43.72 MALIGNANT MELANOMA OF LEFT FOOT (HCC): Primary | ICD-10-CM

## 2019-08-27 PROCEDURE — 96413 CHEMO IV INFUSION 1 HR: CPT

## 2019-08-27 NOTE — PROGRESS NOTES
Pt here for C6 D1 Ketruda. Arrives Ambulating independently, accompanied by Spouse           Modifications in dose or schedule: No,       Oneil Dial states he feels well and and offers no complaints. Denies any pain. Keytruda given and tolerated well.

## 2019-08-28 ENCOUNTER — TELEPHONE (OUTPATIENT)
Dept: HEMATOLOGY/ONCOLOGY | Facility: HOSPITAL | Age: 69
End: 2019-08-28

## 2019-08-28 NOTE — TELEPHONE ENCOUNTER
I called Earl Kidd and thanked him for trying to get his PET Scan approved. I explained our office will get the approval. We will then call him to tell him he can book his PET scan appointment. Trey thanked me for calling him back. He will await our call.

## 2019-08-28 NOTE — TELEPHONE ENCOUNTER
Danii Tabor called regarding a letter Dr Marcellus Mata gave him to get his PET Scan approved by insurance. He called the insurance company & was told the authorization letter needs to be submitted by the doctor's office to [employer].  Patients can't obtain

## 2019-08-29 ENCOUNTER — OFFICE VISIT (OUTPATIENT)
Dept: SURGERY | Facility: CLINIC | Age: 69
End: 2019-08-29
Payer: COMMERCIAL

## 2019-08-29 DIAGNOSIS — S91.302A UNSPECIFIED OPEN WOUND, LEFT FOOT, INITIAL ENCOUNTER: Primary | ICD-10-CM

## 2019-08-29 PROCEDURE — 99212 OFFICE O/P EST SF 10 MIN: CPT | Performed by: PLASTIC SURGERY

## 2019-08-29 PROCEDURE — G0463 HOSPITAL OUTPT CLINIC VISIT: HCPCS | Performed by: PLASTIC SURGERY

## 2019-08-29 NOTE — PROGRESS NOTES
Surgery 1: L foot melanoma / [facility]) / STSG reconstruction  - Date: 03/08/19  - Days Since: 174    Pt left heel healing well   No open areas pt is applying Eucerin BID to left heel and right thigh donor site  Right thigh donor site healed   Pt complai

## 2019-09-01 RX ORDER — FUROSEMIDE 40 MG/1
40 TABLET ORAL EVERY MORNING
Qty: 90 TABLET | Refills: 0 | Status: SHIPPED | OUTPATIENT
Start: 2019-09-01 | End: 2019-11-13

## 2019-09-01 NOTE — TELEPHONE ENCOUNTER
Hypertensive Medications  Protocol Criteria:  · Appointment scheduled in the past 6 months or in the next 3 months  · BMP or CMP in the past 12 months  · Creatinine result < 2  Recent Outpatient Visits            3 days ago Unspecified open wound, left olga 08/26/2019    CO2 30.0 08/26/2019    GLOBULIN 3.7 08/26/2019    AGRATIO 1.2 02/27/2014    ANIONGAP 8 08/26/2019    [facility] (H) 08/26/2019

## 2019-09-03 ENCOUNTER — HOSPITAL ENCOUNTER (OUTPATIENT)
Dept: GENERAL RADIOLOGY | Facility: HOSPITAL | Age: 69
Discharge: HOME | End: 2019-09-03
Attending: FAMILY MEDICINE
Payer: MEDICARE

## 2019-09-03 DIAGNOSIS — M25.551 RIGHT HIP PAIN: ICD-10-CM

## 2019-09-03 PROCEDURE — 73502 X-RAY EXAM HIP UNI 2-3 VIEWS: CPT | Performed by: FAMILY MEDICINE

## 2019-09-04 ENCOUNTER — OFFICE VISIT (OUTPATIENT)
Dept: SURGERY | Facility: CLINIC | Age: 69
End: 2019-09-04
Payer: COMMERCIAL

## 2019-09-04 VITALS
BODY MASS INDEX: 32.34 KG/M2 | DIASTOLIC BLOOD PRESSURE: 80 MMHG | TEMPERATURE: 98 F | HEIGHT: 74 IN | WEIGHT: 252 LBS | SYSTOLIC BLOOD PRESSURE: 126 MMHG | HEART RATE: 57 BPM

## 2019-09-04 DIAGNOSIS — N13.8 BPH WITH OBSTRUCTION/LOWER URINARY TRACT SYMPTOMS: ICD-10-CM

## 2019-09-04 DIAGNOSIS — Z98.890 HISTORY OF NEEDLE BIOPSY OF PROSTATE WITH NEGATIVE RESULT: ICD-10-CM

## 2019-09-04 DIAGNOSIS — N40.1 BPH WITH OBSTRUCTION/LOWER URINARY TRACT SYMPTOMS: ICD-10-CM

## 2019-09-04 DIAGNOSIS — R97.20 ELEVATED PROSTATE SPECIFIC ANTIGEN (PSA): ICD-10-CM

## 2019-09-04 DIAGNOSIS — R97.20 ELEVATED PSA: Primary | ICD-10-CM

## 2019-09-04 PROCEDURE — 55700 BIOPSY OF PROSTATE,NEEDLE/PUNCH: CPT | Performed by: UROLOGY

## 2019-09-04 PROCEDURE — 76942 ECHO GUIDE FOR BIOPSY: CPT | Performed by: UROLOGY

## 2019-09-04 PROCEDURE — 76872 US TRANSRECTAL: CPT | Performed by: UROLOGY

## 2019-09-04 NOTE — PROCEDURES
[facility], [facility]  Procedure Note  TRUS-Guided Prostate Needle Biopsy     Katee  Patient Status:  Outpatient    1950 MRN [id number]   Location [address] [place] [address] Attending [facility] prostate biopsy under ultrasound guidance.  Using a 7\" 22 gauge spinal needle and using ultrasound guidance, I injected  1% xylocaine solution in each of the following 2 locations: Junction of the prostate and seminal vesicle at the right base; junction of

## 2019-09-05 ENCOUNTER — TELEPHONE (OUTPATIENT)
Dept: HEMATOLOGY/ONCOLOGY | Facility: HOSPITAL | Age: 69
End: 2019-09-05

## 2019-09-05 ENCOUNTER — TELEPHONE (OUTPATIENT)
Dept: SURGERY | Facility: CLINIC | Age: 69
End: 2019-09-05

## 2019-09-05 DIAGNOSIS — C61 PROSTATE CANCER (HCC): Primary | ICD-10-CM

## 2019-09-05 NOTE — TELEPHONE ENCOUNTER
Called and spoke to patient over the phone. I Informed him of the prostate biopsy results which revealed high grade prostate cancer in multiple cores.      Instructed him that given his high risk disease, a staging work-up is indicated with a NM bone scan a

## 2019-09-05 NOTE — TELEPHONE ENCOUNTER
Telephoned patient in light of his new prostate cancer diagnosis, we will plan for him to undergo PET/CT imaging likely September 25 before he follows up with Dr. Albania Dong on September 26.   Discussed with Mr. Loulou Holden a plan for him to have PET/CT imaging an

## 2019-09-09 ENCOUNTER — OFFICE VISIT (OUTPATIENT)
Dept: CARDIOLOGY | Age: 69
End: 2019-09-09

## 2019-09-09 VITALS
DIASTOLIC BLOOD PRESSURE: 60 MMHG | HEART RATE: 60 BPM | SYSTOLIC BLOOD PRESSURE: 112 MMHG | WEIGHT: 255 LBS | HEIGHT: 74 IN | BODY MASS INDEX: 32.73 KG/M2

## 2019-09-09 DIAGNOSIS — E78.49 OTHER HYPERLIPIDEMIA: ICD-10-CM

## 2019-09-09 DIAGNOSIS — I10 ESSENTIAL HYPERTENSION, BENIGN: ICD-10-CM

## 2019-09-09 DIAGNOSIS — I42.0 PRIMARY DILATED CARDIOMYOPATHY (CMD): ICD-10-CM

## 2019-09-09 DIAGNOSIS — I25.10 ATHEROSCLEROSIS OF NATIVE CORONARY ARTERY OF NATIVE HEART WITHOUT ANGINA PECTORIS: Primary | ICD-10-CM

## 2019-09-09 PROCEDURE — 3078F DIAST BP <80 MM HG: CPT | Performed by: INTERNAL MEDICINE

## 2019-09-09 PROCEDURE — 99214 OFFICE O/P EST MOD 30 MIN: CPT | Performed by: INTERNAL MEDICINE

## 2019-09-09 PROCEDURE — 3074F SYST BP LT 130 MM HG: CPT | Performed by: INTERNAL MEDICINE

## 2019-09-09 NOTE — TELEPHONE ENCOUNTER
Called [facility] at [phone] and spoke with Mary Anne Martinez. Auth # [id number] has been approved from 9/9/2019 to 10/24/2019. Valid for CPT 32758 and ICD-10 C61. MyChart message to patient with detailed authorization and scheduling information.

## 2019-09-12 NOTE — TELEPHONE ENCOUNTER
"Hospital Medicine Daily Progress Note    Date of Service  9/12/2019    Chief Complaint  73 y.o. female admitted 9/2/2019 with left hip pain.     Hospital Course    74yo female hx of dementia, bullous penphigoid on cellcept, PVD with fem-pop, hyperlipidemia, cellulitis and lower extremity ulcers, chronic wounds of left hip with surgeries admitted with findings of left hip skin abscess ad cellulitis. History of MRSA and MSSA infection in her LLE. Recent was MSSA infection requiring a wound vac and completed course of antibiotics last April 2019 and there is a wound culture that grew MRSA in  August 2019 that is resistant to clindamycin.  She also presented with multiple falls with left hip pain. She lives with a roommate and family member had reported roommate recalling that she had been consuming alcohol. Poor historian at baseline.  Hip wound has been draining-  culture + MRSA.   ID consulted and has been on antibiotics.    Interval Problem Update    Patient with periods of delirium with confusion.  States she was seeing things not there, like \"a ghost story\"   Neck and hip pain improved , Requires occ oxycodone.  No numbness, new weakness other than right shoulder that she's states had for unspecified amount time. Has left pl effusion - has not had shortness of breath or hypoxia.     Consultants/Specialty  Dr. Guevara, NSG  Dr. More, ID    Code Status  Full     Disposition    Anticipate will need placement.     Review of Systems  Review of Systems   Constitutional: Negative for chills and fever.   HENT: Negative for congestion.    Eyes: Negative for discharge and redness.   Respiratory: Negative for cough, shortness of breath and stridor.    Cardiovascular: Negative for chest pain and leg swelling.   Gastrointestinal: Negative for abdominal pain, diarrhea and vomiting.   Genitourinary: Negative for flank pain and hematuria.   Musculoskeletal: Positive for falls, joint pain and neck pain. Negative for back pain. " LM requesting a call back to review new prescription. Ask for nurse.        Mild   Neurological: Positive for weakness (generalized  with increased right shoulder, chronic. ). Negative for dizziness, sensory change, speech change, focal weakness and headaches.   Psychiatric/Behavioral: Positive for hallucinations, memory loss and substance abuse.        Physical Exam  Temp:  [35.8 °C (96.5 °F)-36.8 °C (98.3 °F)] 35.8 °C (96.5 °F)  Pulse:  [79-99] 99  Resp:  [17-19] 19  BP: (102-134)/(67-83) 109/71  SpO2:  [90 %-98 %] 93 %    Physical Exam   Constitutional: No distress.   HENT:   Head: Normocephalic.   Left eye faint yuki orbital ecchymosis   Left forehead abrasion.   Eyes: EOM are normal. Right eye exhibits no discharge. Left eye exhibits no discharge.   Neck:   [place] J collar support.    Cardiovascular: Normal rate and regular rhythm.   No murmur heard.  Pulmonary/Chest: No stridor. She has no wheezes. She has no rales.   Dim bs left lung base.    Abdominal: Soft. Bowel sounds are normal. She exhibits no distension. There is no tenderness.   Obese.   Musculoskeletal: She exhibits deformity (chronic left foot.). She exhibits no edema or tenderness.   Left hip incision dressed CDI  Right shoulder weakness with abduction. Chronic   5/5 strength.   Neurological: She is alert.   Sensation intact  Oriented x2   Skin: Skin is warm and dry. She is not diaphoretic. No pallor.   Psychiatric: Cognition and memory are impaired.   Vitals reviewed.      Fluids  No intake or output data in the 24 hours ending 09/12/19 0759    Laboratory      Recent Labs     09/12/19  0522   SODIUM 133*   POTASSIUM 3.9   CHLORIDE 100   CO2 22   GLUCOSE 126*   BUN 20   CREATININE 0.73   CALCIUM 9.5                   Imaging  MR-CERVICAL SPINE-W/O   Final Result      1.  Acute fracture C2 vertebral body. Please see CT scan report for further details about the fracture.   2.  Mild amount of epidural hemorrhage at the levels of C2, C3 and C4.   3.  Severe central canal stenosis at the levels of C2-3, C3-4 and C4-5  likely secondary to the combinations of disc bulge, prominent ligamentum flavum and epidural hemorrhage.   4.  There is T2 hyperintensity in the region of apical ligament and anterior atlantooccipital membrane likely representing injury. The alar and transverse ligaments are intact.   5.  The flow voids of the bilateral vertebral arteries are intact.   6.  Degenerative disease as described above.   7.  Left pleural effusion.   8.  There is an approximately 11 x 24 x 9 mm sized nodule in the left lobe of the thyroid.      CT-CTA NECK WITH & W/O-POST PROCESSING   Final Result      1.  Comminuted type III C2 fracture involving the right foramen transversarium again noted.   2.  No evidence of dissection or significant stenosis of the neck arteries.   3.  Moderate to large left pleural effusion. Multiple acute/recent-appearing left-sided rib fractures.      CT-CSPINE WITHOUT PLUS RECONS   Final Result   Addendum 1 of 1   Addendum:   Findings are discussed with Dr. Awan at 1:00 AM on 9/10/2019.      Final      DX-CERVICAL SPINE-2 OR 3 VIEWS   Final Result         Very limited exam.      There appears to be an oblique fracture involving the C2, poorly visualized. The fracture lucency appears somewhat corticated, suggesting subacute to chronic. Further evaluation with CT is recommended.         CRITICAL RESULT READ BACK: Preliminary findings discussed with and critical read back performed by Dr Norton  via telephone on 9/9/2019 6:46 PM      IR-MIDLINE CATHETER INSERTION WO GUIDANCE > AGE 3   Final Result                  Ultrasound-guided midline placement performed by qualified nursing staff    as above.          MR-LUMBAR SPINE-W/O   Final Result      1.  Multilevel degenerative changes detailed.   2.  Mild to moderate spinal stenosis L2-L3, mild L1-L2 and L3-L4.         DX-LUMBAR SPINE-2 OR 3 VIEWS   Final Result      1.  There is moderate lumbar spondylosis with degenerative disease most prominent at the L3-4  level.   2.  There is no acute fracture or malalignment.      CT-HIP WITH LEFT   Final Result      1.  There is a questionable small subcutaneous fluid collection just deep to the skin along the left lateral hip at the level the greater trochanter which could be a small subcutaneous abscess.   2.  There is a left hip arthroplasty with postoperative changes as noted above causing moderate artifact to the joint space. There is no gross effusion.           Assessment/Plan  * C2 cervical fracture (HCC)  Assessment & Plan  Suspect subacute/ chronic associated with hx of falls with assoc spinal canal blood on CT. vitamin D level normal  Plan stabilize with [place] J collar.  MRI with combination spinal stenosis from disk bulge, ligament hypertrophy and epidural bleed- upper ext neuro exam non focal- will monitor neuro symptoms with serial exams.  Evaluated by NSG-- Poor surgical candidate - stabilize in collar x 2 months. To have outpatient fu with NSG to assess response to treatment and determination for surgical intervention.   Pain control - prn tylenol. Decrease oxycodone with concern for delirium.  Trial low dose robaxin prn for pain and spasms.     MRI cervical spine :   1.  Acute fracture C2 vertebral body. Please see CT scan report for further details about the fracture.  2.  Mild amount of epidural hemorrhage at the levels of C2, C3 and C4.  3.  Severe central canal stenosis at the levels of C2-3, C3-4 and C4-5 likely secondary to the combinations of disc bulge, prominent ligamentum flavum and epidural hemorrhage.  4.  There is T2 hyperintensity in the region of apical ligament and anterior atlantooccipital membrane likely representing injury. The alar and transverse ligaments are intact.  5.  The flow voids of the bilateral vertebral arteries are intact.  6.  Degenerative disease as described above.  7.  Left pleural effusion.  8.  There is an approximately 11 x 24 x 9 mm sized nodule in the left lobe of the  thyroid.    Delirium  Assessment & Plan  Likely assoc with opiates, hospitalization w baseline dementia.   Decrease oxycodone. Trial of non narcotic Robaxin prn for neck and hip pain, spasms.     Skin abscess over hip, inability to walk- (present on admission)  Assessment & Plan  With drainage from wound - MRSA (+).  Decreased  continue Zyvox through 9/14/2019.  Continue with wound care, ID input    Bullous pemphigus- (present on admission)  Assessment & Plan  continue Cellcept  Recommended to follow up with outpatient Rheumatology.      History of hip fracture- (present on admission)  Assessment & Plan  Follow up CT (-) for fracture    Closed fracture of multiple ribs  Assessment & Plan  Left sided - subacute.  Pain controlled  Supportive pain control    Pleural effusion  Assessment & Plan  Mod to large left sided.  Incidental findings- patient reports no shortness of breath or  acute respiratory symptoms.  No tachycardia or hypoxia.  I discussed option of thoracentesis with patient.  She continues to report no shortness of breath.  We will have reconsideration  if develops shortness of breath, dyspnea.  Monitor SaO2.  Check BNP    Hyponatremia- (present on admission)  Assessment & Plan  Chronic, previous Na 120-130s earlier in year.  Patient asymptomatic, monitor periodically    Peripheral vascular disease (HCC)- (present on admission)  Assessment & Plan  With history of fem-pop bypass. ABIs WNL in April   Cleared by neurosurgery for antiplatelets- plan aspirin therapy        Essential hypertension- (present on admission)  Assessment & Plan  Controlled  continue Amlodipine and Metoprolol   Hydralazine dosing recently decreased- monitor BP response.   Prn Vasotec.      Neck pain on right side  Assessment & Plan  Associated with falls - fu CT scan with subacute / chronic- C2 Fx.   [place] J collar plan for 2 months, pain control   Outpatient follow-up with neurosurgery to assess for proper healing.  May need surgery  depending on response.  Pain spasm control.    Diarrhea  Assessment & Plan  Resolved.   Hold laxatives as indicated.   ID is following        Alcohol use- (present on admission)  Assessment & Plan  Reported recent use by roommate- not showing s/s withdrawal  Nutrition support-- continue daily MVI, thiamine and folate        History of MRSA infection- (present on admission)  Assessment & Plan  With Active MRSA infection  Oral Zyvox per ID through 9/14/2019      Age-related physical debility- (present on admission)  Assessment & Plan  per therapy, patient is not safe to return to her current living situation  Thyroid, B12 level normal  Discussed with case management, anticipate will DC to SNF for continued rehab/PT OT      Thyroid nodule  Assessment & Plan  Outpatient follow up. Discussed with patient.     Iron deficiency anemia- (present on admission)  Assessment & Plan  Hgb stable   Continue iron supplements.   Monitor for acute bleed symptoms       VTE prophylaxis: Lovenox.

## 2019-09-16 ENCOUNTER — OFFICE VISIT (OUTPATIENT)
Dept: HEMATOLOGY/ONCOLOGY | Facility: HOSPITAL | Age: 69
End: 2019-09-16
Attending: INTERNAL MEDICINE
Payer: MEDICARE

## 2019-09-16 ENCOUNTER — HOSPITAL ENCOUNTER (OUTPATIENT)
Dept: NUCLEAR MEDICINE | Facility: HOSPITAL | Age: 69
Discharge: HOME | End: 2019-09-16
Attending: UROLOGY
Payer: MEDICARE

## 2019-09-16 VITALS
DIASTOLIC BLOOD PRESSURE: 58 MMHG | RESPIRATION RATE: 16 BRPM | SYSTOLIC BLOOD PRESSURE: 97 MMHG | HEART RATE: 58 BPM | HEIGHT: 74 IN | WEIGHT: 247 LBS | OXYGEN SATURATION: 98 % | BODY MASS INDEX: 31.7 KG/M2 | TEMPERATURE: 98 F

## 2019-09-16 DIAGNOSIS — C43.72 MALIGNANT MELANOMA OF LEFT FOOT (HCC): Primary | ICD-10-CM

## 2019-09-16 DIAGNOSIS — C61 PROSTATE CANCER (HCC): ICD-10-CM

## 2019-09-16 DIAGNOSIS — Z51.81 MEDICATION MONITORING ENCOUNTER: ICD-10-CM

## 2019-09-16 LAB
ALBUMIN SERPL-MCNC: 4 G/DL (ref 3.4–5)
ALBUMIN/GLOB SERPL: 1 {RATIO} (ref 1–2)
ALP LIVER SERPL-CCNC: 83 U/L (ref 45–117)
ALT SERPL-CCNC: 22 U/L (ref 16–61)
ANION GAP SERPL CALC-SCNC: 6 MMOL/L (ref 0–18)
AST SERPL-CCNC: 23 U/L (ref 15–37)
BASOPHILS # BLD AUTO: 0.05 X10(3) UL (ref 0–0.2)
BASOPHILS NFR BLD AUTO: 1 %
BILIRUB SERPL-MCNC: 0.9 MG/DL (ref 0.1–2)
BUN BLD-MCNC: 11 MG/DL (ref 7–18)
BUN/CREAT SERPL: 8 (ref 10–20)
CALCIUM BLD-MCNC: 9 MG/DL (ref 8.5–10.1)
CHLORIDE SERPL-SCNC: 107 MMOL/L (ref 98–112)
CO2 SERPL-SCNC: 28 MMOL/L (ref 21–32)
CREAT BLD-MCNC: 1.38 MG/DL (ref 0.7–1.3)
DEPRECATED RDW RBC AUTO: 42.2 FL (ref 35.1–46.3)
EOSINOPHIL # BLD AUTO: 0.34 X10(3) UL (ref 0–0.7)
EOSINOPHIL NFR BLD AUTO: 6.6 %
ERYTHROCYTE [DISTWIDTH] IN BLOOD BY AUTOMATED COUNT: 13.7 % (ref 11–15)
GLOBULIN PLAS-MCNC: 4.1 G/DL (ref 2.8–4.4)
GLUCOSE BLD-MCNC: 82 MG/DL (ref 70–99)
HCT VFR BLD AUTO: 42.4 % (ref 39–53)
HGB BLD-MCNC: 14 G/DL (ref 13–17.5)
IMM GRANULOCYTES # BLD AUTO: 0.02 X10(3) UL (ref 0–1)
IMM GRANULOCYTES NFR BLD: 0.4 %
LYMPHOCYTES # BLD AUTO: 1.64 X10(3) UL (ref 1–4)
LYMPHOCYTES NFR BLD AUTO: 31.7 %
M PROTEIN MFR SERPL ELPH: 8.1 G/DL (ref 6.4–8.2)
MCH RBC QN AUTO: 27.8 PG (ref 26–34)
MCHC RBC AUTO-ENTMCNC: 33 G/DL (ref 31–37)
MCV RBC AUTO: 84.1 FL (ref 80–100)
MONOCYTES # BLD AUTO: 0.44 X10(3) UL (ref 0.1–1)
MONOCYTES NFR BLD AUTO: 8.5 %
NEUTROPHILS # BLD AUTO: 2.68 X10 (3) UL (ref 1.5–7.7)
NEUTROPHILS # BLD AUTO: 2.68 X10(3) UL (ref 1.5–7.7)
NEUTROPHILS NFR BLD AUTO: 51.8 %
OSMOLALITY SERPL CALC.SUM OF ELEC: 290 MOSM/KG (ref 275–295)
PATIENT FASTING: NO
PLATELET # BLD AUTO: 195 10(3)UL (ref 150–450)
POTASSIUM SERPL-SCNC: 3.6 MMOL/L (ref 3.5–5.1)
RBC # BLD AUTO: 5.04 X10(6)UL (ref 3.8–5.8)
SODIUM SERPL-SCNC: 141 MMOL/L (ref 136–145)
TSI SER-ACNC: 0.96 MIU/ML (ref 0.36–3.74)
WBC # BLD AUTO: 5.2 X10(3) UL (ref 4–11)

## 2019-09-16 PROCEDURE — 36415 COLL VENOUS BLD VENIPUNCTURE: CPT

## 2019-09-16 PROCEDURE — 99214 OFFICE O/P EST MOD 30 MIN: CPT | Performed by: PHYSICIAN ASSISTANT

## 2019-09-16 PROCEDURE — 80053 COMPREHEN METABOLIC PANEL: CPT

## 2019-09-16 PROCEDURE — 84443 ASSAY THYROID STIM HORMONE: CPT

## 2019-09-16 PROCEDURE — 78306 BONE IMAGING WHOLE BODY: CPT | Performed by: UROLOGY

## 2019-09-16 PROCEDURE — 85025 COMPLETE CBC W/AUTO DIFF WBC: CPT

## 2019-09-16 NOTE — PROGRESS NOTES
Cancer Center Progress Note    Patient Name: Avila Grimm   YOB: 1950   Medical Record Number: [id number]   CSN: [id number]   Consulting Physician: Alxe Victoria MD  Referring Physician(s): Alex Victoria  Date of Visit: 8/26/2019     [facility] closure with split- thickness skin graft from right thigh by plastic surgeon, Dr. Wilma Mary. General Broussard presents at the time of consultation alone.   He reports being in his usual state of health over the last few months prior to diagnosis or curre anticoagulation using Xarelto; etiology of this clot is unknown, doesn't appear catheter induced   • Diverticular disease 1992   • Esophageal reflux    • Gastroenteritis 2015   • Gastrointestinal bleeding 2014   • Heart attack ([facility] [address] 2004   • High blood pre Colon Cancer Daughter 40        this individual's maternal grandfather has colon cancer   • Bleeding Disorders Neg    • Clotting Disorder Neg    • Sickle Cell Neg              Social History:  Social History    Socioeconomic History      Marital status:  [place] Sun: Not Asked        Bad sunburns in the past: Not Asked        Tanning Salons in the Past: Not Asked        Hx of Radiation Treatments: Not Asked        Regular use of sun block: Not Asked         Service: Not Asked        Blood Transfusions: Not improving on gabapentin recently prescriped    A comprehensive 14 point review of systems was completed. Pertinent positives and negatives noted in the the HPI.      Vital Signs:  BP 97/58 (BP Location: Right arm, Patient Position: Sitting, Cuff Size: larg 09/16/2019 01:07 PM    LPFWMR 45 09/16/2019 01:07 PM    AST 23 09/16/2019 01:07 PM    ALT 22 09/16/2019 01:07 PM       Imaging:    N/A        Impression:    (C43.72) Malignant melanoma of left foot ([facility] [address]  (primary encounter diagnosis)    (Z51.81) Medicatio signs concerning for progression of disease. --His adjuvant systemic therapy is for up to 1 year after his surgery date. --pt has clinical M0 disease via PET/CT scan.  We have reviewed his CT brain with and without contrast shows no evidence of any in

## 2019-09-17 ENCOUNTER — OFFICE VISIT (OUTPATIENT)
Dept: HEMATOLOGY/ONCOLOGY | Facility: HOSPITAL | Age: 69
End: 2019-09-17
Attending: PHYSICIAN ASSISTANT
Payer: MEDICARE

## 2019-09-17 VITALS
OXYGEN SATURATION: 99 % | RESPIRATION RATE: 16 BRPM | DIASTOLIC BLOOD PRESSURE: 62 MMHG | HEART RATE: 59 BPM | TEMPERATURE: 98 F | SYSTOLIC BLOOD PRESSURE: 110 MMHG

## 2019-09-17 DIAGNOSIS — C43.72 MALIGNANT MELANOMA OF LEFT FOOT (HCC): Primary | ICD-10-CM

## 2019-09-17 PROCEDURE — 96413 CHEMO IV INFUSION 1 HR: CPT

## 2019-09-17 RX ORDER — GABAPENTIN 300 MG/1
300 CAPSULE ORAL NIGHTLY
Qty: 30 CAPSULE | Refills: 0 | Status: SHIPPED | OUTPATIENT
Start: 2019-09-17 | End: 2019-10-14

## 2019-09-17 NOTE — PROGRESS NOTES
Pt here for C7 D1 Ketruda. Arrives Ambulating independently, accompanied by Spouse           Modifications in dose or schedule: No,       Jessica Infante states he feels well and and offers no complaints. Denies any pain. Keytruda given and tolerated well.

## 2019-09-17 NOTE — TELEPHONE ENCOUNTER
Medication request: Gabapentin 300mg 1 CAP HS    LOV: 06/06/19  NOV: none    [place] 05/28/19 #90 r-0    LOV 06/06/19: Continue Gabapentin at night time dose only

## 2019-09-19 ENCOUNTER — PATIENT MESSAGE (OUTPATIENT)
Dept: FAMILY MEDICINE CLINIC | Facility: CLINIC | Age: 69
End: 2019-09-19

## 2019-09-19 DIAGNOSIS — I10 ESSENTIAL HYPERTENSION: ICD-10-CM

## 2019-09-19 RX ORDER — LOSARTAN POTASSIUM 100 MG/1
TABLET ORAL
Qty: 90 TABLET | Refills: 1 | Status: CANCELLED | OUTPATIENT
Start: 2019-09-19

## 2019-09-20 NOTE — TELEPHONE ENCOUNTER
RN pls call pt and verify rx refill and pharm location, thanks. LR 7-3-19 # 90 + 1    Refill passed per [facility] protocol.     Hypertensive Medications  Protocol Criteria:  · Appointment scheduled in the past 6 months or in the next 3 months  · BMP 09/16/2019    K 3.6 09/16/2019     09/16/2019    CO2 28.0 09/16/2019    GLOBULIN 4.1 09/16/2019    AGRATIO 1.2 02/27/2014    ANIONGAP 6 09/16/2019    OSMOCALC 290 09/16/2019

## 2019-09-20 NOTE — TELEPHONE ENCOUNTER
From: Sonny Williamson  To: Hong Anderson DO  Sent: 9/19/2019 9:56 PM CDT  Subject: Prescription Question    Dr. Rashad Sandoval my last refill for LOSARTAN 100MG TABLETS was July 5, 2019.  I only have enough medication left to last me through September 26,

## 2019-09-20 NOTE — TELEPHONE ENCOUNTER
Spoke with [facility] in [facility]) and patient does have a 90 day supply of Losartan 100 mg available--they will get it ready for  within the hour. The other refill requests were denied, as it showed refills on file.  [facility] Paloma Chiang

## 2019-09-23 ENCOUNTER — TELEPHONE (OUTPATIENT)
Dept: SURGERY | Facility: CLINIC | Age: 69
End: 2019-09-23

## 2019-09-23 ENCOUNTER — HOSPITAL ENCOUNTER (OUTPATIENT)
Dept: NUCLEAR MEDICINE | Facility: HOSPITAL | Age: 69
Discharge: HOME | End: 2019-09-23
Attending: INTERNAL MEDICINE
Payer: MEDICARE

## 2019-09-23 DIAGNOSIS — Z51.12 ENCOUNTER FOR ANTINEOPLASTIC IMMUNOTHERAPY: ICD-10-CM

## 2019-09-23 DIAGNOSIS — C43.72 MALIGNANT MELANOMA OF LEFT FOOT (HCC): ICD-10-CM

## 2019-09-23 LAB — GLUCOSE BLDC GLUCOMTR-MCNC: 88 MG/DL (ref 70–99)

## 2019-09-23 PROCEDURE — 82962 GLUCOSE BLOOD TEST: CPT

## 2019-09-23 PROCEDURE — 78816 PET IMAGE W/CT FULL BODY: CPT | Performed by: INTERNAL MEDICINE

## 2019-09-23 NOTE — TELEPHONE ENCOUNTER
LM for PT    MD will be out of office 9/26    Appt will need to be rescheduled. Tentatively scheduled patient for 9/30 @11am.    [facility] if patient calls back, please confirm if patient can make appt.      Future Appointments   Date Time Provider Department C

## 2019-09-30 ENCOUNTER — OFFICE VISIT (OUTPATIENT)
Dept: SURGERY | Facility: CLINIC | Age: 69
End: 2019-09-30
Payer: COMMERCIAL

## 2019-09-30 ENCOUNTER — TELEPHONE (OUTPATIENT)
Dept: SURGERY | Facility: CLINIC | Age: 69
End: 2019-09-30

## 2019-09-30 VITALS — BODY MASS INDEX: 32 KG/M2 | SYSTOLIC BLOOD PRESSURE: 128 MMHG | WEIGHT: 247 LBS | DIASTOLIC BLOOD PRESSURE: 64 MMHG

## 2019-09-30 DIAGNOSIS — C61 PROSTATE CANCER (HCC): Primary | ICD-10-CM

## 2019-09-30 PROCEDURE — 99354 PROLONGED SERV,OFFICE,1ST HR: CPT | Performed by: UROLOGY

## 2019-09-30 PROCEDURE — G0463 HOSPITAL OUTPT CLINIC VISIT: HCPCS | Performed by: UROLOGY

## 2019-09-30 PROCEDURE — 99214 OFFICE O/P EST MOD 30 MIN: CPT | Performed by: UROLOGY

## 2019-09-30 RX ORDER — BICALUTAMIDE 50 MG/1
50 TABLET, FILM COATED ORAL NIGHTLY
Qty: 30 TABLET | Refills: 0 | Status: SHIPPED | OUTPATIENT
Start: 2019-09-30 | End: 2019-12-03 | Stop reason: ALTCHOICE

## 2019-09-30 NOTE — TELEPHONE ENCOUNTER
Pt will be returning in 3 weeks for Eligard 45 mg injection for prostate cancer and will need PA completed thru his [facility] ins.

## 2019-09-30 NOTE — TELEPHONE ENCOUNTER
Medication PA Requested:         Eligard 3 month                                                  Pt insurance/number to contact: [employer]   CoverMyMeds Used:    Key:   Insurance ID# [id number]  Dx.: S30  Pt authorization num

## 2019-09-30 NOTE — TELEPHONE ENCOUNTER
Dr. Mirza Bruner is not showing in network with patient's health plan. Patient can be seen by Dr. Elif Hester. Please advise if referral can be changed. Thank you, James Peres Specialist    [facility].

## 2019-09-30 NOTE — PROGRESS NOTES
[facility] Urology  Follow-Up Visit    HPI: Chris Cali is a 68year old male presents for a follow up visit. Patient was last seen on 9/4/2019 for his TRUS-Guided prostate biopsy. He is here by himself.     INTERVAL HISTORY: Patient is here to d history. Reviewed med list and allergies. REVIEW OF SYSTEMS:  Review of Systems  Pertinent positives and negatives per HPI. A 10 point review of systems was performed and is otherwise negative.     EXAM:  /64 (BP Location: Right arm, Patient Posi for perineural invasion     H.  Prostate, right lateral mid; needle core biopsy:  · Prostatic adenocarcinoma, Oneil score 4+5 = 9 (grade group 5), involving 1 of 1 tissue core, and approximately 80% of the prostatic tissue  · Negative for perineural invas calcifications        NM Bone Scan (9/16/2019): Whole body planar bone scan demonstrates no evidence of osseous metastatic disease. There are degenerative changes in the shoulders, hands, knees and feet bilaterally.   There are also degenerative changes in progression. Patient understands that despite vigilant follow-up there will be cases where the cancer spreads and is no longer potentially curable with local therapies.   I reiterated that for many men, Active Surveillance can be associated with increased a concurrent pelvic lymphadenectomy is typically performed for higher risk disease, which serves both a diagnostic and potentially therapeutic purpose if metastatic disease is identified.   While in the majority of cases bilateral nerve-sparing is appropriate dysfunction to minimize voiding bother, or vice-versa.       They understand the final choice of therapy is based on an assessment of the relative medical risks and benefits of each treatment as well as the potential quality of life impacts specific to each

## 2019-09-30 NOTE — PATIENT INSTRUCTIONS
1. Go to the lab and complete the ordered blood work (testosterone level)    2.  the prescription for Bicalutamide 50 mg daily and start taking it as instructed.      3. Call and schedule an appointment with radiation oncology to discuss radiation th

## 2019-10-01 ENCOUNTER — LAB ENCOUNTER (OUTPATIENT)
Dept: LAB | Facility: HOSPITAL | Age: 69
End: 2019-10-01
Attending: UROLOGY
Payer: MEDICARE

## 2019-10-01 DIAGNOSIS — C61 PROSTATE CANCER (HCC): Primary | ICD-10-CM

## 2019-10-01 PROCEDURE — 36415 COLL VENOUS BLD VENIPUNCTURE: CPT

## 2019-10-01 PROCEDURE — 84403 ASSAY OF TOTAL TESTOSTERONE: CPT

## 2019-10-01 NOTE — TELEPHONE ENCOUNTER
I have spoken with patient's [facility], per health plan patient has a limited network. Patient can be seen by the following Radiation Oncology providers Dr. Mandy Dockery, Dr. Maria Antonia Leone.   Please note that Dr. Neo Jose,

## 2019-10-01 NOTE — TELEPHONE ENCOUNTER
I am not sure what to do with this message. I appreciate being informed and made aware, but I do not know how to do insurance checks; please address this and let me know what I need to do.

## 2019-10-01 NOTE — TELEPHONE ENCOUNTER
Spoke to [employer]. Patient sees Dr. Jensen Whittington from medical oncology who is in-network. They believe that Dr. Stephanie Shannon should be within the same networks as Dr. Jensen Whittington.      Advised to have managed care call the cancer center and speak to so

## 2019-10-01 NOTE — TELEPHONE ENCOUNTER
If our radiation oncology group does not take his insurance then he can be referred to Dr. Solis Sanchez of [facility]. You can change the referral.     Thank you.     Vitaly Moses MD   10/01/19

## 2019-10-02 NOTE — TELEPHONE ENCOUNTER
Referral has been updated to Dr. Crystal Philip, and mailed to patient. Thank you, Toni Sousa Specialist    Managed Care.

## 2019-10-08 ENCOUNTER — OFFICE VISIT (OUTPATIENT)
Dept: HEMATOLOGY/ONCOLOGY | Facility: HOSPITAL | Age: 69
End: 2019-10-08
Attending: INTERNAL MEDICINE
Payer: MEDICARE

## 2019-10-08 ENCOUNTER — NURSE ONLY (OUTPATIENT)
Dept: HEMATOLOGY/ONCOLOGY | Facility: HOSPITAL | Age: 69
End: 2019-10-08
Attending: PHYSICIAN ASSISTANT
Payer: MEDICARE

## 2019-10-08 VITALS
DIASTOLIC BLOOD PRESSURE: 68 MMHG | HEIGHT: 74 IN | BODY MASS INDEX: 31.57 KG/M2 | SYSTOLIC BLOOD PRESSURE: 132 MMHG | WEIGHT: 246 LBS | OXYGEN SATURATION: 99 % | TEMPERATURE: 98 F | HEART RATE: 50 BPM | RESPIRATION RATE: 16 BRPM

## 2019-10-08 VITALS
HEART RATE: 50 BPM | WEIGHT: 246 LBS | RESPIRATION RATE: 16 BRPM | SYSTOLIC BLOOD PRESSURE: 132 MMHG | TEMPERATURE: 98 F | HEIGHT: 74 IN | OXYGEN SATURATION: 99 % | BODY MASS INDEX: 31.57 KG/M2 | DIASTOLIC BLOOD PRESSURE: 68 MMHG

## 2019-10-08 DIAGNOSIS — Z51.12 ENCOUNTER FOR ANTINEOPLASTIC IMMUNOTHERAPY: Primary | ICD-10-CM

## 2019-10-08 DIAGNOSIS — M79.672 LEFT FOOT PAIN: ICD-10-CM

## 2019-10-08 DIAGNOSIS — C43.72 MALIGNANT MELANOMA OF LEFT FOOT (HCC): Primary | ICD-10-CM

## 2019-10-08 DIAGNOSIS — C43.72 MALIGNANT MELANOMA OF LEFT FOOT (HCC): ICD-10-CM

## 2019-10-08 DIAGNOSIS — C61 PROSTATE CANCER (HCC): ICD-10-CM

## 2019-10-08 DIAGNOSIS — C43.72 MELANOMA OF FOOT, LEFT (HCC): ICD-10-CM

## 2019-10-08 PROCEDURE — 80053 COMPREHEN METABOLIC PANEL: CPT

## 2019-10-08 PROCEDURE — 85025 COMPLETE CBC W/AUTO DIFF WBC: CPT

## 2019-10-08 PROCEDURE — 96413 CHEMO IV INFUSION 1 HR: CPT

## 2019-10-08 PROCEDURE — 99215 OFFICE O/P EST HI 40 MIN: CPT | Performed by: INTERNAL MEDICINE

## 2019-10-08 NOTE — PROGRESS NOTES
Cancer Center Progress Note    Patient Name: Phu Canela   YOB: 1950   Medical Record Number: [id number]   CSN: [id number]   Consulting Physician: Jonathan Kraft MD  Referring Physician(s): Jonathan Kraft  Date of Visit: 10/08/2019     Chi closure with split- thickness skin graft from right thigh by plastic surgeon, Dr. Emily Becerra. Hao Lobo presents at the time of consultation alone.   He reports being in his usual state of health over the last few months prior to diagnosis or curre • Asthma 1956   • Atherosclerosis of coronary artery 2004   • Colon polyp 2018   • Deep vein thrombosis ([address] 2014    left arm x 2; treated with 6 months of anticoagulation using Xarelto; etiology of this clot is unknown, doesn't appear catheter induced Heart Disorder Father    • Diabetes Mother    • Heart Disorder Sister    • Diabetes Sister    • Heart Disorder Brother    • Breast Cancer Maternal Grandmother    • Colon Cancer Daughter 40        this individual's maternal grandfather has colon cancer   • Yes        Currently spends a great deal of time in the sun: Not Asked        Past Sunlamp Treatments for Acne: Not Asked        Hx of Spending [place] of Time in [place]: Not Asked        Bad sunburns in the past: Not Asked        Tanning Salons in the [place] Disp: 135 tablet Rfl: 1   LOSARTAN 100 MG Oral Tab TAKE 1 TABLET(100 MG) BY MOUTH DAILY Disp: 90 tablet Rfl: 1   POTASSIUM CHLORIDE ER 10 MEQ Oral Tab CR TAKE 1 TABLET BY MOUTH EVERY DAY Disp: 90 tablet Rfl: 1   SIMVASTATIN 80 MG Oral Tab TAKE 1 TABLET BY and oriented x 3, not in acute distress. Psych: Friendly, cooperative with appropriate questions and responses  HEENT: EOMs intact. Oropharynx is clear. Neck: No palpable lymphadenopathy. Neck is supple. Lymphatics:  There is no palpable lymphadenopath demonstrates no definite evidence of active malignancy  2. There is hypermetabolic opacification of the heels just anterior to the calcaneus bilaterally.   Given the bilateral nature, this is likely either stress reaction or posttraumatic in nature however combination oral chemotherapy using Dabrafenib/trametinib.      --proceed with cycle 8 of immunotherapy, pembrolizumab, every 3 weeks;    --surveillance PET/CT scan on 9/23/19; ordered in light of his new prostate cancer, shows no definitive evidence of act

## 2019-10-08 NOTE — PROGRESS NOTES
Pt here for [place] Plane. Arrives Ambulating independently    Modifications in dose or schedule: No,    Patient reports he is feeling well, denies any complaints. PIV previously established during lab draw, positive blood return noted.  Frequency of b

## 2019-10-09 RX ORDER — ACETAMINOPHEN AND CODEINE PHOSPHATE 300; 30 MG/1; MG/1
1 TABLET ORAL EVERY 4 HOURS PRN
Qty: 50 TABLET | Refills: 1 | Status: SHIPPED | OUTPATIENT
Start: 2019-10-09 | End: 2020-01-24

## 2019-10-09 NOTE — TELEPHONE ENCOUNTER
Controlled medication pending for review. Please change to phone in, fax, or print script if not being sent electronically.     Last Rx: 8-22-19 # 50  LOV:  8-22-19    Requested Prescriptions     Pending Prescriptions Disp Refills   • Acetaminophen-Codei

## 2019-10-14 RX ORDER — GABAPENTIN 300 MG/1
CAPSULE ORAL
Qty: 30 CAPSULE | Refills: 0 | Status: SHIPPED | OUTPATIENT
Start: 2019-10-16 | End: 2019-11-13

## 2019-10-23 ENCOUNTER — OFFICE VISIT (OUTPATIENT)
Dept: SURGERY | Facility: CLINIC | Age: 69
End: 2019-10-23
Payer: COMMERCIAL

## 2019-10-23 ENCOUNTER — ANCILLARY ORDERS (OUTPATIENT)
Dept: CARDIOLOGY | Age: 69
End: 2019-10-23

## 2019-10-23 ENCOUNTER — ANCILLARY PROCEDURE (OUTPATIENT)
Dept: CARDIOLOGY | Age: 69
End: 2019-10-23
Attending: INTERNAL MEDICINE

## 2019-10-23 VITALS
WEIGHT: 247 LBS | HEART RATE: 55 BPM | HEIGHT: 74 IN | DIASTOLIC BLOOD PRESSURE: 61 MMHG | BODY MASS INDEX: 31.7 KG/M2 | SYSTOLIC BLOOD PRESSURE: 104 MMHG | TEMPERATURE: 98 F

## 2019-10-23 DIAGNOSIS — N13.8 BPH WITH OBSTRUCTION/LOWER URINARY TRACT SYMPTOMS: ICD-10-CM

## 2019-10-23 DIAGNOSIS — Z95.810 ICD (IMPLANTABLE CARDIOVERTER-DEFIBRILLATOR) IN PLACE: ICD-10-CM

## 2019-10-23 DIAGNOSIS — C61 PROSTATE CANCER (HCC): Primary | ICD-10-CM

## 2019-10-23 DIAGNOSIS — N40.1 BPH WITH OBSTRUCTION/LOWER URINARY TRACT SYMPTOMS: ICD-10-CM

## 2019-10-23 PROCEDURE — 96402 CHEMO HORMON ANTINEOPL SQ/IM: CPT | Performed by: UROLOGY

## 2019-10-23 PROCEDURE — 93296 REM INTERROG EVL PM/IDS: CPT | Performed by: INTERNAL MEDICINE

## 2019-10-23 PROCEDURE — 93295 DEV INTERROG REMOTE 1/2/MLT: CPT | Performed by: INTERNAL MEDICINE

## 2019-10-23 PROCEDURE — 81002 URINALYSIS NONAUTO W/O SCOPE: CPT | Performed by: UROLOGY

## 2019-10-23 PROCEDURE — G0463 HOSPITAL OUTPT CLINIC VISIT: HCPCS | Performed by: UROLOGY

## 2019-10-23 PROCEDURE — X1114 CARDIAC DEVICE HOME CHECK - REMOTE UNSCHEDULED: HCPCS | Performed by: INTERNAL MEDICINE

## 2019-10-23 PROCEDURE — 99213 OFFICE O/P EST LOW 20 MIN: CPT | Performed by: UROLOGY

## 2019-10-23 NOTE — PROGRESS NOTES
[facility] Urology  Follow-Up Visit    HPI: Chris Cali is a 68year old male presents for a follow up visit. Patient was last seen on 9/30/2019. He is here by himself.     INTERVAL HISTORY: Patient is here to receive his first ADT injection with Dr. Domitila Rivera. Sharyle Cabot is  and has 2 daughters. He denies ever smoking. Ceil  does not drink alcohol. He is a . Reviewed past medical, surgical, family, and social history. Reviewed med list and allergies.     REVIEW OF SYSTEMS: 4. Coronary artery calcifications        NM Bone Scan (9/16/2019): Whole body planar bone scan demonstrates no evidence of osseous metastatic disease. There are degenerative changes in the shoulders, hands, knees and feet bilaterally.   There are also dege

## 2019-10-24 ENCOUNTER — PATIENT MESSAGE (OUTPATIENT)
Dept: SURGERY | Facility: CLINIC | Age: 69
End: 2019-10-24

## 2019-10-24 NOTE — TELEPHONE ENCOUNTER
Dr. Brent Thao office called and was looking to speak with somebody to get CPT and treatment plans for the patient so they could get a referral in. I explained that the patient has not been seen and the PCP would have to put in a referral for a consult. The lady asked that I put in a message and she get a call back to get the needed information.  Please advise

## 2019-10-25 ENCOUNTER — TELEPHONE (OUTPATIENT)
Dept: SURGERY | Facility: CLINIC | Age: 69
End: 2019-10-25

## 2019-10-25 NOTE — TELEPHONE ENCOUNTER
Contacted Dr. Sebastian [place] office and left a message for Ana Mosley in the referral dept regarding CPT codes for a referral they need to obtain for this patient.

## 2019-10-25 NOTE — TELEPHONE ENCOUNTER
From: [facility]  To: Shannon Lloyd MD  Sent: 10/24/2019 2:24 PM CDT  Subject: Prescription Question    Doctor am I taking the appropriate vitamins?

## 2019-10-25 NOTE — TELEPHONE ENCOUNTER
Dr. Bernie Gomez, patient would like to confirm his daily vitamins are sufficient in brand, dose and frequency. Currently taking:    Nature's measure Vitamin D 400 IU 1 soft gel daily. Nature's measure Calcium+Vit D 600mg-200IU 1 tablet BID.      Patient sent

## 2019-10-25 NOTE — TELEPHONE ENCOUNTER
From: Bishop Hunt  To: Benjamín Child MD  Sent: 10/24/2019  2:24 PM CDT  Subject: Prescription Question    Doctor am I taking the appropriate vitamins?

## 2019-10-25 NOTE — TELEPHONE ENCOUNTER
Dr. Андрей Alejandra please advise on patient's vitamins. He sent a picture of the vitamin bottles he is currently using. Thanks. I sent the mychart rather than converting to telephone encounter because I could not copy the picture over. Thanks.

## 2019-10-28 ENCOUNTER — IMMUNIZATION (OUTPATIENT)
Dept: FAMILY MEDICINE CLINIC | Facility: CLINIC | Age: 69
End: 2019-10-28
Payer: COMMERCIAL

## 2019-10-28 DIAGNOSIS — Z23 NEED FOR VACCINATION: ICD-10-CM

## 2019-10-28 PROCEDURE — G0008 ADMIN INFLUENZA VIRUS VAC: HCPCS | Performed by: FAMILY MEDICINE

## 2019-10-28 PROCEDURE — 90662 IIV NO PRSV INCREASED AG IM: CPT | Performed by: FAMILY MEDICINE

## 2019-10-29 ENCOUNTER — OFFICE VISIT (OUTPATIENT)
Dept: HEMATOLOGY/ONCOLOGY | Facility: HOSPITAL | Age: 69
End: 2019-10-29
Attending: PHYSICIAN ASSISTANT
Payer: MEDICARE

## 2019-10-29 ENCOUNTER — OFFICE VISIT (OUTPATIENT)
Dept: HEMATOLOGY/ONCOLOGY | Facility: HOSPITAL | Age: 69
End: 2019-10-29
Attending: INTERNAL MEDICINE
Payer: MEDICARE

## 2019-10-29 VITALS
WEIGHT: 248 LBS | OXYGEN SATURATION: 98 % | SYSTOLIC BLOOD PRESSURE: 107 MMHG | DIASTOLIC BLOOD PRESSURE: 61 MMHG | HEIGHT: 74 IN | HEART RATE: 53 BPM | TEMPERATURE: 98 F | RESPIRATION RATE: 18 BRPM | BODY MASS INDEX: 31.83 KG/M2

## 2019-10-29 VITALS
HEART RATE: 55 BPM | RESPIRATION RATE: 18 BRPM | OXYGEN SATURATION: 98 % | SYSTOLIC BLOOD PRESSURE: 110 MMHG | DIASTOLIC BLOOD PRESSURE: 58 MMHG | TEMPERATURE: 98 F

## 2019-10-29 DIAGNOSIS — C43.72 MALIGNANT MELANOMA OF LEFT FOOT (HCC): Primary | ICD-10-CM

## 2019-10-29 DIAGNOSIS — C43.72 MALIGNANT MELANOMA OF LEFT FOOT (HCC): ICD-10-CM

## 2019-10-29 DIAGNOSIS — Z51.12 ENCOUNTER FOR ANTINEOPLASTIC IMMUNOTHERAPY: Primary | ICD-10-CM

## 2019-10-29 DIAGNOSIS — C61 PROSTATE CANCER (HCC): ICD-10-CM

## 2019-10-29 PROCEDURE — 84443 ASSAY THYROID STIM HORMONE: CPT

## 2019-10-29 PROCEDURE — 99215 OFFICE O/P EST HI 40 MIN: CPT | Performed by: INTERNAL MEDICINE

## 2019-10-29 PROCEDURE — 85025 COMPLETE CBC W/AUTO DIFF WBC: CPT

## 2019-10-29 PROCEDURE — 80053 COMPREHEN METABOLIC PANEL: CPT

## 2019-10-29 PROCEDURE — 96413 CHEMO IV INFUSION 1 HR: CPT

## 2019-10-29 NOTE — PROGRESS NOTES
Cancer Center Progress Note    Patient Name: Cezar Garcia   YOB: 1950   Medical Record Number: [id number]   CSN: [id number]   Consulting Physician: Randy Thompson MD  Referring Physician(s): Randy Thompson  Date of Visit: 10/29/2019     Chi closure with split- thickness skin graft from right thigh by plastic surgeon, Dr. Yue Brewster. Mahnaz Jacobs presents at the time of consultation alone.   He reports being in his usual state of health over the last few months prior to diagnosis or curre Date   • Asthma 1956   • Atherosclerosis of coronary artery 2004   • Colon polyp 2018   • Deep vein thrombosis ([facility] [address] 2014    left arm x 2; treated with 6 months of anticoagulation using Xarelto; etiology of this clot is unknown, doesn't appear catheter ind Family Medical History:  Family History   Problem Relation Age of Onset   • Heart Disorder Father    • Diabetes Mother    • Heart Disorder Sister    • Diabetes Sister    • Heart Disorder Brother    • Breast Cancer Maternal Grandmother    • Colon Canc Reaction to local anesthetic: No        Left Handed: Not Asked        Right Handed: Yes        Currently spends a great deal of time in the sun: Not Asked        Past Sunlamp Treatments for Acne: Not Asked        Hx of Spending [address] of Time in [place]: N Rfl: 1  PANTOPRAZOLE SODIUM 40 MG Oral Tab EC, TAKE 1 TABLET(40 MG) BY MOUTH EVERY MORNING BEFORE BREAKFAST, Disp: 90 tablet, Rfl: 1  METOPROLOL SUCCINATE  MG Oral Tablet 24 Hr, TAKE 1& 1/2 TABLETS(150 MG TOTAL) BY MOUTH DAILY, Disp: 135 tablet, Rfl: Location: Left arm, Patient Position: Sitting, Cuff Size: large)   Pulse 53   Temp 98.1 °F (36.7 °C) (Oral)   Resp 18   Ht 1.88 m (6' 2\")   Wt 112.5 kg (248 lb)   SpO2 98%   BMI 31.84 kg/m²     Physical Examination:    General: Patient is alert and orient demonstrates no evidence of osseous metastatic disease. There are degenerative changes in the shoulders, hands, knees and feet bilaterally. There are also degenerative changes in the cervical spine.     PET/CT scan 9/23/19  CONCLUSION:   1. F 18 FDG PET-C wound at left heel. Discussed with Dr. Lalo Jhaveri and he's been looked at again and may need another skin closure procedure; will see her in 1 week to reassess if the wound has healed.     --Patient's tumor is negative for BRAF-V600 mutation, so will not n [address]  [facility]

## 2019-10-29 NOTE — PROGRESS NOTES
Pt here for 5 [facility]. Arrives Ambulating independently    Modifications in dose or schedule: No,    Patient reports he is feeling well, denies any complaints. PIV previously established during lab draw, positive blood return noted.  Frequency

## 2019-11-13 ENCOUNTER — TELEPHONE (OUTPATIENT)
Dept: NEUROLOGY | Facility: CLINIC | Age: 69
End: 2019-11-13

## 2019-11-13 RX ORDER — GABAPENTIN 300 MG/1
CAPSULE ORAL
Qty: 30 CAPSULE | Refills: 0 | Status: SHIPPED | OUTPATIENT
Start: 2019-11-13 | End: 2019-12-08

## 2019-11-13 NOTE — TELEPHONE ENCOUNTER
Gabapentin 300mg Take 1 cap nightly #30.  No refills    Last filled-10/16/2019    LOV-6/6/2019  NOV-none    Routing to front office to have patient schedule follow up-    (was to follow up in 3 months-9/2019)

## 2019-11-14 RX ORDER — FUROSEMIDE 40 MG/1
TABLET ORAL
Qty: 90 TABLET | Refills: 1 | Status: SHIPPED | OUTPATIENT
Start: 2019-11-14 | End: 2020-05-08

## 2019-11-15 ENCOUNTER — TELEPHONE (OUTPATIENT)
Dept: FAMILY MEDICINE CLINIC | Facility: CLINIC | Age: 69
End: 2019-11-15

## 2019-11-15 DIAGNOSIS — C61 PROSTATE CANCER (HCC): Primary | ICD-10-CM

## 2019-11-15 NOTE — TELEPHONE ENCOUNTER
Refill passed per [facility], [facility] protocol.   Hypertensive Medications  Protocol Criteria:  · Appointment scheduled in the past 6 months or in the next 3 months  · BMP or CMP in the past 12 months  · Creatinine result < 2  Recent Outpatient Visits  10/29/2019    CO2 29.0 10/29/2019    GLOBULIN 3.5 10/29/2019    AGRATIO 1.2 02/27/2014    ANIONGAP 5 10/29/2019    OSMOCALC 286 10/29/2019

## 2019-11-15 NOTE — TELEPHONE ENCOUNTER
José Manuel Roberts from [facility] financial services called requesting referral for pt.   pt had consult 11/6 Dr Carla Perdue

## 2019-11-16 ENCOUNTER — HOSPITAL ENCOUNTER (EMERGENCY)
Facility: HOSPITAL | Age: 69
Discharge: HOME | End: 2019-11-16
Attending: EMERGENCY MEDICINE
Payer: MEDICARE

## 2019-11-16 ENCOUNTER — APPOINTMENT (OUTPATIENT)
Dept: ULTRASOUND IMAGING | Facility: HOSPITAL | Age: 69
End: 2019-11-16
Attending: EMERGENCY MEDICINE
Payer: MEDICARE

## 2019-11-16 VITALS
TEMPERATURE: 99 F | OXYGEN SATURATION: 96 % | DIASTOLIC BLOOD PRESSURE: 79 MMHG | SYSTOLIC BLOOD PRESSURE: 119 MMHG | RESPIRATION RATE: 18 BRPM | WEIGHT: 250 LBS | HEART RATE: 60 BPM | BODY MASS INDEX: 32 KG/M2

## 2019-11-16 DIAGNOSIS — N50.811 TESTICULAR PAIN, RIGHT: Primary | ICD-10-CM

## 2019-11-16 DIAGNOSIS — N45.3 EPIDIDYMO-ORCHITIS: ICD-10-CM

## 2019-11-16 PROCEDURE — 85025 COMPLETE CBC W/AUTO DIFF WBC: CPT | Performed by: EMERGENCY MEDICINE

## 2019-11-16 PROCEDURE — 99284 EMERGENCY DEPT VISIT MOD MDM: CPT

## 2019-11-16 PROCEDURE — 87086 URINE CULTURE/COLONY COUNT: CPT | Performed by: EMERGENCY MEDICINE

## 2019-11-16 PROCEDURE — 81001 URINALYSIS AUTO W/SCOPE: CPT | Performed by: EMERGENCY MEDICINE

## 2019-11-16 PROCEDURE — 80048 BASIC METABOLIC PNL TOTAL CA: CPT | Performed by: EMERGENCY MEDICINE

## 2019-11-16 PROCEDURE — 76870 US EXAM SCROTUM: CPT | Performed by: EMERGENCY MEDICINE

## 2019-11-16 PROCEDURE — 93975 VASCULAR STUDY: CPT | Performed by: EMERGENCY MEDICINE

## 2019-11-16 PROCEDURE — 87077 CULTURE AEROBIC IDENTIFY: CPT | Performed by: EMERGENCY MEDICINE

## 2019-11-16 PROCEDURE — 96374 THER/PROPH/DIAG INJ IV PUSH: CPT

## 2019-11-16 RX ORDER — SULFAMETHOXAZOLE AND TRIMETHOPRIM 800; 160 MG/1; MG/1
1 TABLET ORAL ONCE
Status: COMPLETED | OUTPATIENT
Start: 2019-11-16 | End: 2019-11-16

## 2019-11-16 RX ORDER — SULFAMETHOXAZOLE AND TRIMETHOPRIM 800; 160 MG/1; MG/1
1 TABLET ORAL 2 TIMES DAILY
Qty: 20 TABLET | Refills: 0 | Status: SHIPPED | OUTPATIENT
Start: 2019-11-16 | End: 2019-11-26

## 2019-11-16 RX ORDER — KETOROLAC TROMETHAMINE 15 MG/ML
15 INJECTION, SOLUTION INTRAMUSCULAR; INTRAVENOUS ONCE
Status: COMPLETED | OUTPATIENT
Start: 2019-11-16 | End: 2019-11-16

## 2019-11-17 NOTE — ED NOTES
Patient received in sign out. Labs and imaging reviewed. US read by vision:     SCROTAL ULTRASOUND:    IMPRESSION    There is enlargement and hyperemia of the right epididymis and testicle concerning for epididymal orchitis. No evidence of abscess.   Mild

## 2019-11-17 NOTE — ED INITIAL ASSESSMENT (HPI)
Pt stated approx 30 minutes prior to arrival noticed swelling to right side of scrotum with severe pain.

## 2019-11-17 NOTE — ED PROVIDER NOTES
Patient Seen in: [facility] Emergency Department    History   Patient presents with:  Pain (neurologic)    Stated Complaint: scrotal pain    Patient complains of r scrotal  pain that began tonight. Pain is 7/10. Associated with nausea.   No fever SENTINEL LYMPH NODE BIOPSY Left 3/8/2019    Performed by Kaleb Matta MD at [address] [place] [address] OR   • SKIN GRAFT SPLIT THICKNESS Right 3/8/2019    Performed by Xuan Nava MD at [address] [place] [address] OR   • [address] [place] [address]    lumbar discectomy grandfather has colon cancer   • Bleeding Disorders Neg    • Clotting Disorder Neg    • Sickle Cell Neg        Social History    Tobacco Use      Smoking status: Never Smoker      Smokeless tobacco: Never Used    Alcohol use: No    Drug use: No      Review Few (*)     All other components within normal limits   BASIC METABOLIC PANEL (8) - Abnormal; Notable for the following components:    Glucose 116 (*)     All other components within normal limits   CBC WITH DIFFERENTIAL WITH PLATELET    Narrative:      The

## 2019-11-18 ENCOUNTER — OFFICE VISIT (OUTPATIENT)
Dept: NEUROLOGY | Facility: CLINIC | Age: 69
End: 2019-11-18
Payer: COMMERCIAL

## 2019-11-18 VITALS
SYSTOLIC BLOOD PRESSURE: 110 MMHG | BODY MASS INDEX: 32.08 KG/M2 | HEART RATE: 60 BPM | HEIGHT: 74 IN | WEIGHT: 250 LBS | DIASTOLIC BLOOD PRESSURE: 66 MMHG

## 2019-11-18 DIAGNOSIS — I25.2 HISTORY OF MI (MYOCARDIAL INFARCTION): ICD-10-CM

## 2019-11-18 DIAGNOSIS — R20.2 NUMBNESS AND TINGLING OF FOOT: ICD-10-CM

## 2019-11-18 DIAGNOSIS — E66.9 OBESITY (BMI 30.0-34.9): ICD-10-CM

## 2019-11-18 DIAGNOSIS — C43.72 MALIGNANT MELANOMA OF LEFT FOOT (HCC): Primary | ICD-10-CM

## 2019-11-18 DIAGNOSIS — R20.0 NUMBNESS AND TINGLING OF FOOT: ICD-10-CM

## 2019-11-18 DIAGNOSIS — I10 ESSENTIAL HYPERTENSION: ICD-10-CM

## 2019-11-18 PROCEDURE — 99214 OFFICE O/P EST MOD 30 MIN: CPT | Performed by: PHYSICAL MEDICINE & REHABILITATION

## 2019-11-18 NOTE — PATIENT INSTRUCTIONS
-Finish your antibiotics and monitor for improvement  -If pain continues to persist increase Gabapentin medicine to three times/day (morning, afternoon and night) as tolerated  -Follow up in 4 weeks

## 2019-11-18 NOTE — PROGRESS NOTES
[address]  NEW PATIENT EVALUATION      Chief Complaint: left heel pain. HISTORY OF PRESENT ILLNESS:   Patient presents with: Foot Pain: LOV: 6/6/19. F/U on left heel pain.  Patient states that he was doi will have any difficulties while there or if there is anything he should be cautious about. Denies any knee, hip, back pain today. Patient is following with oncologist and with Dr. Julee wyatt as well.   He continues to apply cream and massage the area a Hyperlipidemia    • Kidney stones    • Melanoma ([facility] 11/29/2018    Left Plantar Heel   • Pneumonia 1950, 2012   • Prostate cancer ([facility] 09/04/2019    4+5=9, 4+4=8, 3+5=8, 4+3=7 and 3+3=6   • Visual impairment     glasses         PAST SURGICAL HISTORY: • Calcium 500-125 MG-UNIT Oral Tab Take by mouth. • Acetaminophen-Codeine #3 300-30 MG Oral Tab Take 1 tablet by mouth every 4 (four) hours as needed for Pain. 50 tablet 1   • bicalutamide 50 MG Oral Tab Take 1 tablet (50 mg total) by mouth nightly. throat  Respiratory: negative for cough and dyspnea on exertion  Cardiovascular: negative for chest pain, dyspnea, exertional chest pressure/discomfort, orthopnea and paroxysmal nocturnal dyspnea  Gastrointestinal: negative for abdominal pain, constipation 5  Sensation: Intact to light touch in all dermatomes of the lower extremities      LABS:   No results found for: EAG, A1C  Lab Results   Component Value Date    WBC 7.7 11/16/2019    RBC 4.81 11/16/2019    HGB 13.2 11/16/2019    HCT 39.9 11/16/2019    MCV gabapentin medication up to 3 times daily as tolerated and it was discussed in clinic today. Additionally, he should continue follow-up with his oncology team for routine follow-up.   Advised patient to follow-up with me in 4 weeks at which time we can ree

## 2019-11-18 NOTE — TELEPHONE ENCOUNTER
Left message- ext [phone] today only until 4:30pm  Referral is in the system from 09/30/2019.  No fax # was given, unable to fax referral.

## 2019-11-19 ENCOUNTER — OFFICE VISIT (OUTPATIENT)
Dept: HEMATOLOGY/ONCOLOGY | Facility: HOSPITAL | Age: 69
End: 2019-11-19
Attending: PHYSICIAN ASSISTANT
Payer: MEDICARE

## 2019-11-19 ENCOUNTER — OFFICE VISIT (OUTPATIENT)
Dept: HEMATOLOGY/ONCOLOGY | Facility: HOSPITAL | Age: 69
End: 2019-11-19
Attending: INTERNAL MEDICINE
Payer: MEDICARE

## 2019-11-19 VITALS
WEIGHT: 247 LBS | DIASTOLIC BLOOD PRESSURE: 61 MMHG | SYSTOLIC BLOOD PRESSURE: 111 MMHG | TEMPERATURE: 98 F | OXYGEN SATURATION: 98 % | BODY MASS INDEX: 32 KG/M2 | HEART RATE: 58 BPM | RESPIRATION RATE: 18 BRPM

## 2019-11-19 VITALS
TEMPERATURE: 98 F | WEIGHT: 247 LBS | DIASTOLIC BLOOD PRESSURE: 61 MMHG | HEIGHT: 74 IN | BODY MASS INDEX: 31.7 KG/M2 | RESPIRATION RATE: 18 BRPM | SYSTOLIC BLOOD PRESSURE: 111 MMHG | HEART RATE: 58 BPM | OXYGEN SATURATION: 98 %

## 2019-11-19 DIAGNOSIS — C43.72 MALIGNANT MELANOMA OF LEFT FOOT (HCC): Primary | ICD-10-CM

## 2019-11-19 DIAGNOSIS — Z51.81 MEDICATION MONITORING ENCOUNTER: ICD-10-CM

## 2019-11-19 PROCEDURE — 80053 COMPREHEN METABOLIC PANEL: CPT

## 2019-11-19 PROCEDURE — 99215 OFFICE O/P EST HI 40 MIN: CPT | Performed by: PHYSICIAN ASSISTANT

## 2019-11-19 PROCEDURE — 85025 COMPLETE CBC W/AUTO DIFF WBC: CPT

## 2019-11-19 PROCEDURE — 96413 CHEMO IV INFUSION 1 HR: CPT

## 2019-11-19 NOTE — PATIENT INSTRUCTIONS
1. Proceed with cycle 10    2. Finish Bactrim DS antibiotic. Call if greater than 3 episodes of diarrhea in 24 hours. 3.  Call as needed    4.   Follow-up prior to cycle 11

## 2019-11-19 NOTE — PROGRESS NOTES
Cancer Center Progress Note    Patient Name: Sara Horn   YOB: 1950   Medical Record Number: [id number]   CSN: [id number]   Consulting Physician: Reece Estevez MD  Referring Physician(s): Reece Estevez  Date of Visit: 10/29/2019     Chi closure with split- thickness skin graft from right thigh by plastic surgeon, Dr. Tanna Olivera. Farzad Marcanontjorge presents at the time of consultation alone.   He reports being in his usual state of health over the last few months prior to diagnosis or curre any treatment associated side effects. He continues to work regularly as a  with his [employer]. He he mentions his LE neuropathy symptom can be troubling at times.      11/16/19 Scrotal US completed    Past Medical History:  Past Medical History MD at [facility] OR   • SKIN GRAFT SPLIT THICKNESS Right 3/8/2019    Performed by Eden Diaz MD at [facility] OR   • SPINE SURGERY PROCEDURE UNLISTED  2000    lumbar discectomy   • SPLIT GRFT PROC HEAD,FAC,HAND <100CM Left 03/08/2019    heel recons Emotionally abused: Not on file        Physically abused: Not on file        Forced sexual activity: Not on file    Other Topics      Concerns:        Grew up on a farm: Not Asked        History of tanning: Not Asked        Outdoor occupation: Not Asked Calcium 500-125 MG-UNIT Oral Tab, Take by mouth., Disp: , Rfl:   Acetaminophen-Codeine #3 300-30 MG Oral Tab, Take 1 tablet by mouth every 4 (four) hours as needed for Pain., Disp: 50 tablet, Rfl: 1  bicalutamide 50 MG Oral Tab, Take 1 tablet (50 mg tota Negative for myalgias, arthralgias, muscle weakness. Neurological: Negative for headaches, dizziness, speech problems, gait problems and weakness.  +electric pulsations in the left foot area post operatively; improving on gabapentin recently prescriped   9.7 11/19/2019 10:41 AM    ALB 3.4 11/19/2019 10:41 AM     (L) 11/19/2019 10:41 AM    K 3.9 11/19/2019 10:41 AM     11/19/2019 10:41 AM    CO2 25.0 11/19/2019 10:41 AM    ALKPHO 102 11/19/2019 10:41 AM    AST 21 11/19/2019 10:41 AM    ALT 25 11 peripheral margins free of melanoma, pT3bN1, as there was 1 out of 8 (1/8) local regional lymph nodes identified as there was left popliteal sentinel lymph node involved with metastatic melanoma IHC consistent with Melan-A and SOX-10.       --The patient th is being planned for definitive treatment with radiation+ADT; he will be tx with radiation therapy with Dr. Uday Baker through [facility] and ADT therapy will be administered by Dr. Ashwin Abbott       3.) hx of LUE DVT in 2014    -- unprovoked in 2014 and tx for ant

## 2019-11-20 NOTE — TELEPHONE ENCOUNTER
Spoke to Candy Lozoya from Dr. Leger Spearing office who is requesting a referral for prostate cancer treatment. Candy Lozoya states a fax requesting was faxed to [facility] office. Referral has been pended.  Dr. Yvette Valladares, please sign referral. Please reply to pool: [facility]

## 2019-11-22 NOTE — TELEPHONE ENCOUNTER
Managed care dept please notify patient once referral is approved, thank you.     Referral Notes   Number of Notes: 1   Type Date User Summary Attachment   Provider Comments 11/22/2019  8:26 AM Kevin Villar DO Provider Comments -   Note    CPT 44638

## 2019-12-02 ENCOUNTER — TELEPHONE (OUTPATIENT)
Dept: FAMILY MEDICINE CLINIC | Facility: CLINIC | Age: 69
End: 2019-12-02

## 2019-12-02 NOTE — TELEPHONE ENCOUNTER
please see pt mychart message below. Pt was in the ER on 11/16/2019. I did call pt and he stated that the swelling is much better and it's not as tender but he still has swelling.  Pt is trying to get in to see Dr. Rc Patel tomorrow but he is wa

## 2019-12-02 NOTE — TELEPHONE ENCOUNTER
Advised patient of Dr. Emeterio Benjamin note. Patient verbalized understanding and agreed. Appointment booked.

## 2019-12-03 ENCOUNTER — OFFICE VISIT (OUTPATIENT)
Dept: FAMILY MEDICINE CLINIC | Facility: CLINIC | Age: 69
End: 2019-12-03
Payer: COMMERCIAL

## 2019-12-03 VITALS
TEMPERATURE: 98 F | BODY MASS INDEX: 31.7 KG/M2 | HEIGHT: 74 IN | WEIGHT: 247 LBS | HEART RATE: 59 BPM | SYSTOLIC BLOOD PRESSURE: 104 MMHG | DIASTOLIC BLOOD PRESSURE: 60 MMHG | RESPIRATION RATE: 16 BRPM

## 2019-12-03 DIAGNOSIS — C61 PROSTATE CA (HCC): ICD-10-CM

## 2019-12-03 DIAGNOSIS — N45.2 ORCHITIS OF RIGHT TESTICLE: Primary | ICD-10-CM

## 2019-12-03 PROCEDURE — 99214 OFFICE O/P EST MOD 30 MIN: CPT | Performed by: FAMILY MEDICINE

## 2019-12-03 NOTE — PATIENT INSTRUCTIONS
Patient to be seen by urology. Patient encouraged to continue with the supportive underwear that he has purchased to keep his testicles/scrotum lifted up and close to the body (not having). Pain management via prescription medications as needed.   Leisa Patel

## 2019-12-03 NOTE — TELEPHONE ENCOUNTER
Jo/ Financial Counselor of [employer] is requesting that referral for Radiation Therapy (Dr. Hines Ask) be faxed to fax# [phone]. Please advise.

## 2019-12-03 NOTE — TELEPHONE ENCOUNTER
[facility] order has to indicate a total of 25 visits per CPT code. Order has been corrected.      Referral will be sent to [facility] for insurance approval.

## 2019-12-05 ENCOUNTER — OFFICE VISIT (OUTPATIENT)
Dept: SURGERY | Facility: CLINIC | Age: 69
End: 2019-12-05
Payer: COMMERCIAL

## 2019-12-05 VITALS
HEIGHT: 74 IN | HEART RATE: 57 BPM | WEIGHT: 247 LBS | RESPIRATION RATE: 16 BRPM | SYSTOLIC BLOOD PRESSURE: 117 MMHG | DIASTOLIC BLOOD PRESSURE: 73 MMHG | TEMPERATURE: 98 F | BODY MASS INDEX: 31.7 KG/M2

## 2019-12-05 DIAGNOSIS — C61 PROSTATE CANCER (HCC): ICD-10-CM

## 2019-12-05 DIAGNOSIS — N45.1 EPIDIDYMITIS, RIGHT: ICD-10-CM

## 2019-12-05 DIAGNOSIS — N45.3 EPIDIDYMOORCHITIS: Primary | ICD-10-CM

## 2019-12-05 PROCEDURE — 99213 OFFICE O/P EST LOW 20 MIN: CPT | Performed by: UROLOGY

## 2019-12-05 PROCEDURE — G0463 HOSPITAL OUTPT CLINIC VISIT: HCPCS | Performed by: UROLOGY

## 2019-12-05 RX ORDER — CEFADROXIL 1000 MG/1
1 TABLET ORAL 2 TIMES DAILY
Qty: 28 TABLET | Refills: 0 | Status: SHIPPED | OUTPATIENT
Start: 2019-12-05 | End: 2019-12-19

## 2019-12-05 NOTE — PROGRESS NOTES
[facility], [facility] Urology  Follow-Up Visit    HPI: Thee Steve is a 68year old male presents for a follow up visit. Patient was last seen on 10/23/2019. He is here by himself.     INTERVAL HISTORY: Rosie Matt to the ER on 11/16/2019 with complaints of right biopsy:  - iPSA  16.5 ng/mL  - Prostate TRUS volume 45.36 cc  - Prostate adenoCa in 7/12 cores, R>L, up to GG 4+5=9, 80 % core involvement. No PNI.     - Metastatic work-up including NM BS and PET-CT (for his melanoma) revealed no evidence of metasttatic di and dry. Psychiatric: He has a normal mood and affect. PATHOLOGY:    TRUS-Guided Prostate biopsy (9/4/19): Prostate adenoCa in 7/12 cores, R>L, up to GG 4+5=9, 80 % core involvement. No PNI. Prostate volume on TRUS 45.3 cc.      LABS:    Component PSA instructions from the [facility] [place] prostate center regarding initiation of his radiation therapy for prostate cancer. Recent episode of right epididymoorchitis. Discussed findings with patient. He is feeling better.   Given his culture results, strep agalac

## 2019-12-05 NOTE — PROGRESS NOTES
HPI:    Patient ID: Ishaan Trotter is a [id number] old male.     Patient is a 77-year-old -American male well established at this clinic who is a survivor of melanoma of the foot and currently set up for definitive treatment for prostate CA who unfort • aspirin 81 MG Oral Tab Take 81 mg by mouth nightly. • nitroGLYCERIN 0.4 MG Sublingual SL Tab Place 1 tablet (0.4 mg total) under the tongue every 5 (five) minutes as needed for Chest pain.  100 tablet 0     Allergies:No Known Allergies     12/03/19

## 2019-12-09 RX ORDER — GABAPENTIN 300 MG/1
600 CAPSULE ORAL NIGHTLY
Qty: 30 CAPSULE | Refills: 0 | Status: SHIPPED | OUTPATIENT
Start: 2019-12-09 | End: 2019-12-31

## 2019-12-09 NOTE — TELEPHONE ENCOUNTER
Medication request: Gabapentin 300mg 2 CAPS HS    LOV: 11/18/19   NOV: none    [place]: 11/13/19 #30 r-0      S/w patient who states he increased the Gabapentin to 600mg HS.

## 2019-12-10 ENCOUNTER — OFFICE VISIT (OUTPATIENT)
Dept: HEMATOLOGY/ONCOLOGY | Facility: HOSPITAL | Age: 69
End: 2019-12-10
Attending: PHYSICIAN ASSISTANT
Payer: MEDICARE

## 2019-12-10 ENCOUNTER — OFFICE VISIT (OUTPATIENT)
Dept: HEMATOLOGY/ONCOLOGY | Facility: HOSPITAL | Age: 69
End: 2019-12-10
Attending: INTERNAL MEDICINE
Payer: MEDICARE

## 2019-12-10 VITALS
HEIGHT: 74 IN | OXYGEN SATURATION: 97 % | BODY MASS INDEX: 31.95 KG/M2 | TEMPERATURE: 98 F | HEART RATE: 54 BPM | SYSTOLIC BLOOD PRESSURE: 117 MMHG | WEIGHT: 249 LBS | DIASTOLIC BLOOD PRESSURE: 65 MMHG | RESPIRATION RATE: 18 BRPM

## 2019-12-10 VITALS
WEIGHT: 249 LBS | RESPIRATION RATE: 18 BRPM | BODY MASS INDEX: 32 KG/M2 | DIASTOLIC BLOOD PRESSURE: 65 MMHG | OXYGEN SATURATION: 97 % | SYSTOLIC BLOOD PRESSURE: 117 MMHG | HEART RATE: 54 BPM | TEMPERATURE: 98 F

## 2019-12-10 DIAGNOSIS — C61 PROSTATE CANCER (HCC): ICD-10-CM

## 2019-12-10 DIAGNOSIS — C43.72 MALIGNANT MELANOMA OF LEFT FOOT (HCC): Primary | ICD-10-CM

## 2019-12-10 DIAGNOSIS — Z51.12 ENCOUNTER FOR ANTINEOPLASTIC IMMUNOTHERAPY: Primary | ICD-10-CM

## 2019-12-10 DIAGNOSIS — C43.72 MALIGNANT MELANOMA OF LEFT FOOT (HCC): ICD-10-CM

## 2019-12-10 PROCEDURE — 80053 COMPREHEN METABOLIC PANEL: CPT

## 2019-12-10 PROCEDURE — 96413 CHEMO IV INFUSION 1 HR: CPT

## 2019-12-10 PROCEDURE — 84443 ASSAY THYROID STIM HORMONE: CPT

## 2019-12-10 PROCEDURE — 85025 COMPLETE CBC W/AUTO DIFF WBC: CPT

## 2019-12-10 PROCEDURE — 99215 OFFICE O/P EST HI 40 MIN: CPT | Performed by: INTERNAL MEDICINE

## 2019-12-10 NOTE — PROGRESS NOTES
Cancer Center Progress Note    Patient Name: Avila Grimm   YOB: 1950   Medical Record Number: [id number]   CSN: [id number]   Consulting Physician: Alex Victoria MD  Referring Physician(s): Gagan Thakkar  Date of Visit: 12/10/2019 flap closure with split- thickness skin graft from right thigh by plastic surgeon, Dr. Anna Juan. Caitlyn Thao presents at the time of consultation alone.   He reports being in his usual state of health over the last few months prior to diagnosis or neuropathy symptom can be troubling at times.          Past Medical History:  Past Medical History:   Diagnosis Date   • Asthma 1956   • Atherosclerosis of coronary artery 2004   • Colon polyp 2018   • Deep vein thrombosis ([facility] [address] 2014    left arm x 2; treate 2000    lumbar discectomy   • SPLIT GRFT PROC HEAD,FAC,HAND <100CM Left 03/08/2019    heel reconstruction       Family Medical History:  Family History   Problem Relation Age of Onset   • Heart Disorder Father    • Diabetes Mother    • Heart Disorder Siste a farm: Not Asked        History of tanning: Not Asked        Outdoor occupation: Not Asked        Reaction to local anesthetic: No        Left Handed: Not Asked        Right Handed: Yes        Currently spends a great deal of time in the sun: Not Asked tablet, Rfl: 1  tamsulosin HCl 0.4 MG Oral Cap, TAKE 1 CAPSULE(0.4 MG) BY MOUTH DAILY, Disp: 90 capsule, Rfl: 1  PANTOPRAZOLE SODIUM 40 MG Oral Tab EC, TAKE 1 TABLET(40 MG) BY MOUTH EVERY MORNING BEFORE BREAKFAST, Disp: 90 tablet, Rfl: 1  METOPROLOL SUCCIN with some previously noted hematuria    A comprehensive 14 point review of systems was completed. Pertinent positives and negatives noted in the the HPI.      Vital Signs:  /65 (BP Location: Left arm, Patient Position: Sitting, Cuff Size: large)   Pu Whole body planar bone scan demonstrates no evidence of osseous metastatic disease. There are degenerative changes in the shoulders, hands, knees and feet bilaterally. There are also degenerative changes in the cervical spine.     PET/CT scan 9/23/19  CON still has evidence of open wound at left heel. Discussed with Dr. Jennifer Foreman and he's been looked at again and may need another skin closure procedure; will see her in 1 week to reassess if the wound has healed.     --Patient's tumor is negative for BRAF-V orchitis/epididymitis    --per ER evaluation on 11/16/19  --Recommended follow-up with urology, now on cefadroxil 1 g x 14 days    --possibly causing some of the mild anemia          Avila Tanner MD  [facility]  [place]

## 2019-12-10 NOTE — PROGRESS NOTES
Trey to infusion today for C11 D1 Keytruda. Arrives Ambulating independently. He is feeling well, denies any complaints. No fevers or chills, no pain.     Lab Results   Component Value Date    WBC 5.1 12/10/2019    HGB 12.0 (L) 12/10/2019    HCT 35.6 (L

## 2019-12-13 DIAGNOSIS — I10 ESSENTIAL HYPERTENSION: ICD-10-CM

## 2019-12-13 RX ORDER — LOSARTAN POTASSIUM 100 MG/1
TABLET ORAL
Qty: 90 TABLET | Refills: 1 | Status: SHIPPED | OUTPATIENT
Start: 2019-12-13 | End: 2020-06-15

## 2019-12-13 RX ORDER — POTASSIUM CHLORIDE 750 MG/1
TABLET, EXTENDED RELEASE ORAL
Qty: 90 TABLET | Refills: 0 | Status: SHIPPED | OUTPATIENT
Start: 2019-12-13 | End: 2020-03-10

## 2019-12-13 NOTE — TELEPHONE ENCOUNTER
Losartan refilled per protocol.    Please advise on refill for potassium chloride    Hypertensive Medications  Protocol Criteria:  · Appointment scheduled in the past 6 months or in the next 3 months  · BMP or CMP in the past 12 months  · Creatinine result GFRAA 74 12/10/2019    CA 9.2 12/10/2019    ALKPHOS 72 02/27/2014    AST 21 12/10/2019    ALT 25 12/10/2019    BILT 0.4 12/10/2019    TP 7.5 12/10/2019    ALB 3.6 12/10/2019     12/10/2019    K 4.0 12/10/2019     12/10/2019    CO2 27.0 12/10/20

## 2019-12-31 ENCOUNTER — OFFICE VISIT (OUTPATIENT)
Dept: HEMATOLOGY/ONCOLOGY | Facility: HOSPITAL | Age: 69
End: 2019-12-31
Attending: INTERNAL MEDICINE
Payer: MEDICARE

## 2019-12-31 ENCOUNTER — NURSE ONLY (OUTPATIENT)
Dept: HEMATOLOGY/ONCOLOGY | Facility: HOSPITAL | Age: 69
End: 2019-12-31
Attending: PHYSICIAN ASSISTANT
Payer: MEDICARE

## 2019-12-31 VITALS
SYSTOLIC BLOOD PRESSURE: 107 MMHG | TEMPERATURE: 98 F | DIASTOLIC BLOOD PRESSURE: 65 MMHG | BODY MASS INDEX: 31.95 KG/M2 | HEIGHT: 74 IN | WEIGHT: 249 LBS | HEART RATE: 55 BPM | OXYGEN SATURATION: 98 % | RESPIRATION RATE: 18 BRPM

## 2019-12-31 VITALS
TEMPERATURE: 98 F | RESPIRATION RATE: 18 BRPM | BODY MASS INDEX: 31.95 KG/M2 | SYSTOLIC BLOOD PRESSURE: 107 MMHG | WEIGHT: 249 LBS | HEART RATE: 55 BPM | HEIGHT: 74 IN | DIASTOLIC BLOOD PRESSURE: 65 MMHG | OXYGEN SATURATION: 98 %

## 2019-12-31 DIAGNOSIS — C43.72 MALIGNANT MELANOMA OF LEFT FOOT (HCC): ICD-10-CM

## 2019-12-31 DIAGNOSIS — G62.9 NEUROPATHY: Primary | ICD-10-CM

## 2019-12-31 DIAGNOSIS — C43.72 MALIGNANT MELANOMA OF LEFT FOOT (HCC): Primary | ICD-10-CM

## 2019-12-31 DIAGNOSIS — C61 PROSTATE CANCER (HCC): ICD-10-CM

## 2019-12-31 DIAGNOSIS — Z51.12 ENCOUNTER FOR ANTINEOPLASTIC IMMUNOTHERAPY: ICD-10-CM

## 2019-12-31 PROCEDURE — 84443 ASSAY THYROID STIM HORMONE: CPT

## 2019-12-31 PROCEDURE — 96413 CHEMO IV INFUSION 1 HR: CPT

## 2019-12-31 PROCEDURE — 99215 OFFICE O/P EST HI 40 MIN: CPT | Performed by: INTERNAL MEDICINE

## 2019-12-31 PROCEDURE — 85025 COMPLETE CBC W/AUTO DIFF WBC: CPT

## 2019-12-31 PROCEDURE — 80053 COMPREHEN METABOLIC PANEL: CPT

## 2019-12-31 RX ORDER — GABAPENTIN 300 MG/1
600 CAPSULE ORAL NIGHTLY
Qty: 60 CAPSULE | Refills: 3 | Status: SHIPPED | OUTPATIENT
Start: 2019-12-31 | End: 2020-03-25

## 2019-12-31 NOTE — PROGRESS NOTES
Cancer Center Progress Note    Patient Name: Shahnaz Emerson   YOB: 1950   Medical Record Number: [id number]   CSN: [id number]  Consulting Physician: Ana Londono MD  Referring Physician(s): Raymond Copeland  Date of Visit: 12/31/2019 closure with split- thickness skin graft from right thigh by plastic surgeon, Dr. Tonie Nguyen. German Del Valle presents at the time of consultation alone.   He reports being in his usual state of health over the last few months prior to diagnosis or curre work regularly as a  with his [employer]. He he mentions his LE neuropathy symptom can be troubling at times.          Past Medical History:  Past Medical History:   Diagnosis Date   • Asthma 1956   • Atherosclerosis of coronary artery 2004   • Co Sophy Anthony MD at [facility] MAIN OR   • SPINE SURGERY PROCEDURE UNLISTED  2000    lumbar discectomy   • SPLIT GRFT PROC HEAD,FAC,HAND <100CM Left 03/08/2019    heel reconstruction       Family Medical History:  Family History   Problem Relation Age of Forced sexual activity: Not on file    Other Topics      Concerns:        Grew up on a farm: Not Asked        History of tanning: Not Asked        Outdoor occupation: Not Asked        Reaction to local anesthetic: No        Left Handed: Not Asked        Ri 1  Ergocalciferol (VITAMIN D2) 400 units Oral Tab, Take by mouth., Disp: , Rfl:   Calcium 500-125 MG-UNIT Oral Tab, Take by mouth., Disp: , Rfl:   Acetaminophen-Codeine #3 300-30 MG Oral Tab, Take 1 tablet by mouth every 4 (four) hours as needed for Pain. , completed. Pertinent positives and negatives noted in the the HPI.      Vital Signs:  /65 (BP Location: Left arm, Patient Position: Sitting, Cuff Size: large)   Pulse 55   Temp 98.1 °F (36.7 °C) (Oral)   Resp 18   Ht 1.88 m (6' 2\")   Wt 112.9 kg (24 are degenerative changes in the shoulders, hands, knees and feet bilaterally. There are also degenerative changes in the cervical spine.     PET/CT scan 9/23/19  CONCLUSION:   1. F 18 FDG PET-CT study demonstrates no definite evidence of active malignancy wound at left heel. Discussed with Dr. Ioana Kingston and he's been looked at again and may need another skin closure procedure; will see her in 1 week to reassess if the wound has healed.     --Patient's tumor is negative for BRAF-V600 mutation, so will not n symptoms on gabapentin 300 mg/BID, refilled his script today      5.) Right orchitis/epididymitis    --RESOLVED. diagnosed on 11/16/19  --Recommended follow-up with urology, s/p cefadroxil 1 g x 14 days  --possibly causing some of the mild anemia

## 2019-12-31 NOTE — PROGRESS NOTES
Trey to infusion today for C12 D1 Keytruda. Arrives Ambulating independently. He is feeling well, denies any complaints. No fevers or chills, no pain. He did start RT at [facility] to his prostate on Friday 12/27 for the next month.  Mild nausea, diarrhe

## 2020-01-01 ENCOUNTER — TELEPHONE (OUTPATIENT)
Dept: SURGERY | Facility: CLINIC | Age: 70
End: 2020-01-01

## 2020-01-01 DIAGNOSIS — M79.672 LEFT FOOT PAIN: ICD-10-CM

## 2020-01-01 DIAGNOSIS — C43.72 MELANOMA OF FOOT, LEFT (HCC): ICD-10-CM

## 2020-01-01 RX ORDER — ACETAMINOPHEN AND CODEINE PHOSPHATE 300; 30 MG/1; MG/1
TABLET ORAL
Qty: 100 TABLET | Refills: 0 | Status: SHIPPED | OUTPATIENT
Start: 2020-01-01 | End: 2020-10-18

## 2020-01-01 RX ORDER — ACETAMINOPHEN AND CODEINE PHOSPHATE 300; 30 MG/1; MG/1
TABLET ORAL
Qty: 30 TABLET | Refills: 0 | OUTPATIENT
Start: 2020-01-01

## 2020-01-06 PROCEDURE — 93296 REM INTERROG EVL PM/IDS: CPT | Performed by: INTERNAL MEDICINE

## 2020-01-06 PROCEDURE — 93295 DEV INTERROG REMOTE 1/2/MLT: CPT | Performed by: INTERNAL MEDICINE

## 2020-01-08 ENCOUNTER — OFFICE VISIT (OUTPATIENT)
Dept: SURGERY | Facility: CLINIC | Age: 70
End: 2020-01-08
Payer: COMMERCIAL

## 2020-01-08 VITALS — WEIGHT: 250 LBS | BODY MASS INDEX: 32 KG/M2 | TEMPERATURE: 98 F

## 2020-01-08 DIAGNOSIS — K40.90 RIGHT INGUINAL HERNIA: ICD-10-CM

## 2020-01-08 DIAGNOSIS — N45.3 EPIDIDYMOORCHITIS: ICD-10-CM

## 2020-01-08 DIAGNOSIS — C61 PROSTATE CANCER (HCC): ICD-10-CM

## 2020-01-08 DIAGNOSIS — N43.3 RIGHT HYDROCELE: Primary | ICD-10-CM

## 2020-01-08 PROCEDURE — 99213 OFFICE O/P EST LOW 20 MIN: CPT | Performed by: UROLOGY

## 2020-01-08 PROCEDURE — G0463 HOSPITAL OUTPT CLINIC VISIT: HCPCS | Performed by: UROLOGY

## 2020-01-08 NOTE — PROGRESS NOTES
[facility], [facility]rology  Follow-Up Visit    HPI: Silvana Hodge is a 68year old male presents for a follow up visit. Patient was last seen on 12/5/2019. He is here by himself.     INTERVAL HISTORY: Patient started EBRT with Dr. Brent Morton at [facility] with PET-CT (for his melanoma) revealed no evidence of metasttatic disease.    - 9/30/19 F/U: Discussed path. Decided on definitive radiation + ADT. Started bicalutamide in preparation for ADT initiation.        - 10/23/19 F/U: Administered Lupron 45 mg SubQ 6 m spermatic cord. A nontender, reducible right inguinal hernia is noted with a positive cough impulse. Neurological: He is alert and oriented to person, place, and time. Skin: Skin is warm and dry. Psychiatric: He has a normal mood and affect. definitive local treatment with RT (EBRT + LDR brachytherapy) + ADT. Received first 6-month depot ADT injection in 10/2019. Started EBRT at [facility] with plans to complete treatment end of 01/2020.     Recent episode of right epididymoorchitis

## 2020-01-09 ENCOUNTER — ANCILLARY PROCEDURE (OUTPATIENT)
Dept: CARDIOLOGY | Age: 70
End: 2020-01-09
Attending: INTERNAL MEDICINE

## 2020-01-09 DIAGNOSIS — Z95.810 ICD (IMPLANTABLE CARDIOVERTER-DEFIBRILLATOR) IN PLACE: ICD-10-CM

## 2020-01-12 RX ORDER — METOPROLOL SUCCINATE 100 MG/1
TABLET, EXTENDED RELEASE ORAL
Qty: 135 TABLET | Refills: 1 | Status: SHIPPED | OUTPATIENT
Start: 2020-01-12 | End: 2020-07-07

## 2020-01-12 NOTE — TELEPHONE ENCOUNTER
Refill passed per [facility] protocol.   Hypertensive Medications  Protocol Criteria:  · Appointment scheduled in the past 6 months or in the next 3 months  · BMP or CMP in the past 12 months  · Creatinine result < 2  Recent Outpatient Visits GLOBULIN 3.8 12/31/2019    AGRATIO 1.2 02/27/2014    ANIONGAP 6 12/31/2019    OSMOCALC 290 12/31/2019

## 2020-01-17 ENCOUNTER — OFFICE VISIT (OUTPATIENT)
Dept: SURGERY | Facility: CLINIC | Age: 70
End: 2020-01-17
Payer: COMMERCIAL

## 2020-01-17 VITALS — WEIGHT: 250 LBS | HEIGHT: 74 IN | BODY MASS INDEX: 32.08 KG/M2

## 2020-01-17 DIAGNOSIS — K40.90 NON-RECURRENT UNILATERAL INGUINAL HERNIA WITHOUT OBSTRUCTION OR GANGRENE: Primary | ICD-10-CM

## 2020-01-17 PROCEDURE — 99214 OFFICE O/P EST MOD 30 MIN: CPT | Performed by: SURGERY

## 2020-01-17 PROCEDURE — G0463 HOSPITAL OUTPT CLINIC VISIT: HCPCS | Performed by: SURGERY

## 2020-01-17 NOTE — PROGRESS NOTES
Established Patient Follow-up      1/17/2020    Avila Jump 68year old      HPI  Patient presents with:  Hernia: Pt referred by Dr. Nnamdi Vogel regarding [address] Pt c/o intermittent discomfort to area w/ increased activity. Pt  denies dif.  w/ bm's or urn posttraumatic in nature however infection or malignancy cannot be entirely excluded. Please correlate clinically  3. There has been interval resolution of the hypermetabolic skin thickening over the right midthigh anterolaterally.   This indicates healing

## 2020-01-21 ENCOUNTER — TELEPHONE (OUTPATIENT)
Dept: SURGERY | Facility: CLINIC | Age: 70
End: 2020-01-21

## 2020-01-21 ENCOUNTER — OFFICE VISIT (OUTPATIENT)
Dept: HEMATOLOGY/ONCOLOGY | Facility: HOSPITAL | Age: 70
End: 2020-01-21
Attending: INTERNAL MEDICINE
Payer: MEDICARE

## 2020-01-21 ENCOUNTER — OFFICE VISIT (OUTPATIENT)
Dept: HEMATOLOGY/ONCOLOGY | Facility: HOSPITAL | Age: 70
End: 2020-01-21
Attending: PHYSICIAN ASSISTANT
Payer: MEDICARE

## 2020-01-21 VITALS
TEMPERATURE: 98 F | DIASTOLIC BLOOD PRESSURE: 51 MMHG | RESPIRATION RATE: 18 BRPM | WEIGHT: 244 LBS | HEART RATE: 60 BPM | OXYGEN SATURATION: 99 % | HEIGHT: 74 IN | SYSTOLIC BLOOD PRESSURE: 92 MMHG | BODY MASS INDEX: 31.32 KG/M2

## 2020-01-21 DIAGNOSIS — Z51.12 ENCOUNTER FOR ANTINEOPLASTIC IMMUNOTHERAPY: ICD-10-CM

## 2020-01-21 DIAGNOSIS — C61 PROSTATE CANCER (HCC): ICD-10-CM

## 2020-01-21 DIAGNOSIS — C43.72 MALIGNANT MELANOMA OF LEFT FOOT (HCC): Primary | ICD-10-CM

## 2020-01-21 DIAGNOSIS — C43.9 MALIGNANT MELANOMA, UNSPECIFIED SITE (HCC): Primary | ICD-10-CM

## 2020-01-21 LAB
ALBUMIN SERPL-MCNC: 3.6 G/DL (ref 3.4–5)
ALBUMIN/GLOB SERPL: 1 {RATIO} (ref 1–2)
ALP LIVER SERPL-CCNC: 75 U/L (ref 45–117)
ALT SERPL-CCNC: 21 U/L (ref 16–61)
ANION GAP SERPL CALC-SCNC: 6 MMOL/L (ref 0–18)
AST SERPL-CCNC: 23 U/L (ref 15–37)
BASOPHILS # BLD AUTO: 0.02 X10(3) UL (ref 0–0.2)
BASOPHILS NFR BLD AUTO: 0.6 %
BILIRUB SERPL-MCNC: 0.5 MG/DL (ref 0.1–2)
BUN BLD-MCNC: 11 MG/DL (ref 7–18)
BUN/CREAT SERPL: 8.8 (ref 10–20)
CALCIUM BLD-MCNC: 9.1 MG/DL (ref 8.5–10.1)
CHLORIDE SERPL-SCNC: 108 MMOL/L (ref 98–112)
CO2 SERPL-SCNC: 27 MMOL/L (ref 21–32)
CREAT BLD-MCNC: 1.25 MG/DL (ref 0.7–1.3)
DEPRECATED RDW RBC AUTO: 41.3 FL (ref 35.1–46.3)
EOSINOPHIL # BLD AUTO: 0.26 X10(3) UL (ref 0–0.7)
EOSINOPHIL NFR BLD AUTO: 8 %
ERYTHROCYTE [DISTWIDTH] IN BLOOD BY AUTOMATED COUNT: 13.4 % (ref 11–15)
GLOBULIN PLAS-MCNC: 3.7 G/DL (ref 2.8–4.4)
GLUCOSE BLD-MCNC: 100 MG/DL (ref 70–99)
HAV IGM SER QL: 1.8 MG/DL (ref 1.6–2.6)
HCT VFR BLD AUTO: 34 % (ref 39–53)
HGB BLD-MCNC: 11.5 G/DL (ref 13–17.5)
IMM GRANULOCYTES # BLD AUTO: 0.02 X10(3) UL (ref 0–1)
IMM GRANULOCYTES NFR BLD: 0.6 %
LYMPHOCYTES # BLD AUTO: 0.3 X10(3) UL (ref 1–4)
LYMPHOCYTES NFR BLD AUTO: 9.2 %
M PROTEIN MFR SERPL ELPH: 7.3 G/DL (ref 6.4–8.2)
MCH RBC QN AUTO: 28.3 PG (ref 26–34)
MCHC RBC AUTO-ENTMCNC: 33.8 G/DL (ref 31–37)
MCV RBC AUTO: 83.7 FL (ref 80–100)
MONOCYTES # BLD AUTO: 0.35 X10(3) UL (ref 0.1–1)
MONOCYTES NFR BLD AUTO: 10.8 %
NEUTROPHILS # BLD AUTO: 2.3 X10 (3) UL (ref 1.5–7.7)
NEUTROPHILS # BLD AUTO: 2.3 X10(3) UL (ref 1.5–7.7)
NEUTROPHILS NFR BLD AUTO: 70.8 %
OSMOLALITY SERPL CALC.SUM OF ELEC: 291 MOSM/KG (ref 275–295)
PATIENT FASTING Y/N/NP: NO
PLATELET # BLD AUTO: 102 10(3)UL (ref 150–450)
POTASSIUM SERPL-SCNC: 3.5 MMOL/L (ref 3.5–5.1)
RBC # BLD AUTO: 4.06 X10(6)UL (ref 3.8–5.8)
SODIUM SERPL-SCNC: 141 MMOL/L (ref 136–145)
TSI SER-ACNC: 1.72 MIU/ML (ref 0.36–3.74)
WBC # BLD AUTO: 3.3 X10(3) UL (ref 4–11)

## 2020-01-21 PROCEDURE — 83735 ASSAY OF MAGNESIUM: CPT

## 2020-01-21 PROCEDURE — 99215 OFFICE O/P EST HI 40 MIN: CPT | Performed by: INTERNAL MEDICINE

## 2020-01-21 PROCEDURE — 80053 COMPREHEN METABOLIC PANEL: CPT

## 2020-01-21 PROCEDURE — 96413 CHEMO IV INFUSION 1 HR: CPT

## 2020-01-21 PROCEDURE — 85025 COMPLETE CBC W/AUTO DIFF WBC: CPT

## 2020-01-21 PROCEDURE — 84443 ASSAY THYROID STIM HORMONE: CPT

## 2020-01-21 NOTE — PROGRESS NOTES
Cancer Center Progress Note    Patient Name: Shahnaz Emerson   YOB: 1950   Medical Record Number: [id number]   CSN: [id number]  Consulting Physician: Ana Londono MD  Referring Physician(s): Raymond Copeland  Date of Visit: 1/21/2020    ALLEN closure with split- thickness skin graft from right thigh by plastic surgeon, Dr. Karin Hernandez. Angelica Dung presents at the time of consultation alone.   He reports being in his usual state of health over the last few months prior to diagnosis or curre He continues to work regularly as a  with his [employer]. He he mentions his LE neuropathy symptom can be troubling at times.          Past Medical History:  Past Medical History:   Diagnosis Date   • Asthma 1956   • Atherosclerosis of coronary ar MAIN OR   • SPINE SURGERY PROCEDURE UNLISTED  2000    lumbar discectomy   • SPLIT GRFT PROC HEAD,FAC,HAND <100CM Left 03/08/2019    heel reconstruction       Family Medical History:  Family History   Problem Relation Age of Onset   • Heart Disorder Father Other Topics      Concerns:        Grew up on a farm: Not Asked        History of tanning: Not Asked        Outdoor occupation: Not Asked        Reaction to local anesthetic: No        Left Handed: Not Asked        Right Handed: Yes        Currently spends Oral Tab, TAKE 1 TABLET(40 MG) BY MOUTH EVERY MORNING, Disp: 90 tablet, Rfl: 1  Ergocalciferol (VITAMIN D2) 400 units Oral Tab, Take by mouth., Disp: , Rfl:   Calcium 500-125 MG-UNIT Oral Tab, Take by mouth., Disp: , Rfl:   Acetaminophen-Codeine #3 300-30 the the HPI.      Vital Signs:  BP 92/51 (BP Location: Left arm, Patient Position: Sitting, Cuff Size: large)   Pulse 60   Temp 97.9 °F (36.6 °C) (Oral)   Resp 18   Ht 1.88 m (6' 2\")   Wt 110.7 kg (244 lb)   SpO2 99%   BMI 31.33 kg/m²     Physical Examinat shoulders, hands, knees and feet bilaterally. There are also degenerative changes in the cervical spine. PET/CT scan 9/23/19  CONCLUSION:   1. F 18 FDG PET-CT study demonstrates no definite evidence of active malignancy  2.  There is hypermetabolic opac involved with metastatic melanoma IHC consistent with Melan-A and SOX-10.       --The patient then underwent same day intraoperative left foot wound rotation flap closure with split- thickness skin graft from right thigh by plastic surgeon, Dr. Zamora Finger prostate biopsy on 9/4/19  --pat has now started definitive treatment with EBRT+ADT; he will be tx with radiation therapy at [facility] and ADT therapy will be administered by Dr. Vu Mcpherson       3.) hx of LUE DVT in 2014    -- unprovoked in 2014 and tx for ant

## 2020-01-21 NOTE — PROGRESS NOTES
Trey to infusion today for C13 D1 Keytruda. Arrives Ambulating independently. He is feeling well, denies any complaints. No fevers or chills, no pain.  Comes after MD appointment where he states he did inform MD of diarrhea last night, controlled wi

## 2020-01-22 ENCOUNTER — TELEPHONE (OUTPATIENT)
Dept: SURGERY | Facility: CLINIC | Age: 70
End: 2020-01-22

## 2020-01-22 NOTE — TELEPHONE ENCOUNTER
Dr. Roosevelt Gore, We have scheduled Mr. Pollard Geraldo for his port placement on Friday 1/24/2020. Is this acceptable with you?

## 2020-01-23 NOTE — PAT NURSING NOTE
Notified Gilberto Hernandez from [employer] that we will need a rep here to reprogram ICD for surgery tomorrow.

## 2020-01-23 NOTE — PAT NURSING NOTE
Spoke to Elizabeth Monday at [employer] and notified him of patients time of surgery on 1-24-20 at noon

## 2020-01-24 ENCOUNTER — HOSPITAL ENCOUNTER (OUTPATIENT)
Facility: HOSPITAL | Age: 70
Setting detail: HOSPITAL OUTPATIENT SURGERY
Discharge: HOME | End: 2020-01-24
Attending: SURGERY | Admitting: SURGERY
Payer: MEDICARE

## 2020-01-24 ENCOUNTER — ANESTHESIA EVENT (OUTPATIENT)
Dept: SURGERY | Facility: HOSPITAL | Age: 70
End: 2020-01-24
Payer: MEDICARE

## 2020-01-24 ENCOUNTER — APPOINTMENT (OUTPATIENT)
Dept: GENERAL RADIOLOGY | Facility: HOSPITAL | Age: 70
End: 2020-01-24
Attending: SURGERY
Payer: MEDICARE

## 2020-01-24 ENCOUNTER — ANESTHESIA (OUTPATIENT)
Dept: SURGERY | Facility: HOSPITAL | Age: 70
End: 2020-01-24
Payer: MEDICARE

## 2020-01-24 VITALS
RESPIRATION RATE: 16 BRPM | HEIGHT: 74 IN | HEART RATE: 59 BPM | BODY MASS INDEX: 30.67 KG/M2 | DIASTOLIC BLOOD PRESSURE: 70 MMHG | SYSTOLIC BLOOD PRESSURE: 130 MMHG | WEIGHT: 239 LBS | OXYGEN SATURATION: 100 % | TEMPERATURE: 98 F

## 2020-01-24 DIAGNOSIS — C61 PROSTATE CANCER (HCC): Primary | ICD-10-CM

## 2020-01-24 DIAGNOSIS — C43.72 MELANOMA OF FOOT, LEFT (HCC): ICD-10-CM

## 2020-01-24 DIAGNOSIS — M79.672 LEFT FOOT PAIN: ICD-10-CM

## 2020-01-24 DIAGNOSIS — C43.9 MALIGNANT MELANOMA, UNSPECIFIED SITE (HCC): ICD-10-CM

## 2020-01-24 PROCEDURE — 71045 X-RAY EXAM CHEST 1 VIEW: CPT | Performed by: SURGERY

## 2020-01-24 PROCEDURE — 36561 INSERT TUNNELED CV CATH: CPT | Performed by: SURGERY

## 2020-01-24 PROCEDURE — 0JH60WZ INSERTION OF TOTALLY IMPLANTABLE VASCULAR ACCESS DEVICE INTO CHEST SUBCUTANEOUS TISSUE AND FASCIA, OPEN APPROACH: ICD-10-PCS | Performed by: SURGERY

## 2020-01-24 PROCEDURE — 77001 FLUOROGUIDE FOR VEIN DEVICE: CPT | Performed by: SURGERY

## 2020-01-24 PROCEDURE — 76937 US GUIDE VASCULAR ACCESS: CPT | Performed by: SURGERY

## 2020-01-24 DEVICE — CATH SMART PORT 8FR: Type: IMPLANTABLE DEVICE | Site: CHEST | Status: FUNCTIONAL

## 2020-01-24 RX ORDER — HEPARIN SODIUM (PORCINE) LOCK FLUSH IV SOLN 100 UNIT/ML 100 UNIT/ML
SOLUTION INTRAVENOUS AS NEEDED
Status: DISCONTINUED | OUTPATIENT
Start: 2020-01-24 | End: 2020-01-24 | Stop reason: HOSPADM

## 2020-01-24 RX ORDER — HYDROMORPHONE HYDROCHLORIDE 1 MG/ML
0.6 INJECTION, SOLUTION INTRAMUSCULAR; INTRAVENOUS; SUBCUTANEOUS EVERY 5 MIN PRN
Status: CANCELLED | OUTPATIENT
Start: 2020-01-24

## 2020-01-24 RX ORDER — SODIUM CHLORIDE, SODIUM LACTATE, POTASSIUM CHLORIDE, CALCIUM CHLORIDE 600; 310; 30; 20 MG/100ML; MG/100ML; MG/100ML; MG/100ML
INJECTION, SOLUTION INTRAVENOUS CONTINUOUS
Status: DISCONTINUED | OUTPATIENT
Start: 2020-01-24 | End: 2020-01-24

## 2020-01-24 RX ORDER — HYDROMORPHONE HYDROCHLORIDE 1 MG/ML
0.2 INJECTION, SOLUTION INTRAMUSCULAR; INTRAVENOUS; SUBCUTANEOUS EVERY 5 MIN PRN
Status: CANCELLED | OUTPATIENT
Start: 2020-01-24

## 2020-01-24 RX ORDER — BUPIVACAINE HYDROCHLORIDE AND EPINEPHRINE 5; 5 MG/ML; UG/ML
INJECTION, SOLUTION PERINEURAL AS NEEDED
Status: DISCONTINUED | OUTPATIENT
Start: 2020-01-24 | End: 2020-01-24 | Stop reason: HOSPADM

## 2020-01-24 RX ORDER — HYDROCODONE BITARTRATE AND ACETAMINOPHEN 5; 325 MG/1; MG/1
2 TABLET ORAL AS NEEDED
Status: CANCELLED | OUTPATIENT
Start: 2020-01-24

## 2020-01-24 RX ORDER — METOPROLOL TARTRATE 5 MG/5ML
2.5 INJECTION INTRAVENOUS ONCE
Status: CANCELLED | OUTPATIENT
Start: 2020-01-24 | End: 2020-01-24

## 2020-01-24 RX ORDER — HYDROMORPHONE HYDROCHLORIDE 1 MG/ML
0.4 INJECTION, SOLUTION INTRAMUSCULAR; INTRAVENOUS; SUBCUTANEOUS EVERY 5 MIN PRN
Status: CANCELLED | OUTPATIENT
Start: 2020-01-24

## 2020-01-24 RX ORDER — ONDANSETRON 2 MG/ML
4 INJECTION INTRAMUSCULAR; INTRAVENOUS ONCE AS NEEDED
Status: CANCELLED | OUTPATIENT
Start: 2020-01-24 | End: 2020-01-24

## 2020-01-24 RX ORDER — METOCLOPRAMIDE 10 MG/1
10 TABLET ORAL ONCE
Status: COMPLETED | OUTPATIENT
Start: 2020-01-24 | End: 2020-01-24

## 2020-01-24 RX ORDER — MORPHINE SULFATE 4 MG/ML
4 INJECTION, SOLUTION INTRAMUSCULAR; INTRAVENOUS EVERY 10 MIN PRN
Status: CANCELLED | OUTPATIENT
Start: 2020-01-24

## 2020-01-24 RX ORDER — MORPHINE SULFATE 2 MG/ML
2 INJECTION, SOLUTION INTRAMUSCULAR; INTRAVENOUS EVERY 10 MIN PRN
Status: CANCELLED | OUTPATIENT
Start: 2020-01-24

## 2020-01-24 RX ORDER — ACETAMINOPHEN 500 MG
1000 TABLET ORAL ONCE
Status: COMPLETED | OUTPATIENT
Start: 2020-01-24 | End: 2020-01-24

## 2020-01-24 RX ORDER — CEFAZOLIN SODIUM/WATER 2 G/20 ML
2 SYRINGE (ML) INTRAVENOUS ONCE
Status: COMPLETED | OUTPATIENT
Start: 2020-01-24 | End: 2020-01-24

## 2020-01-24 RX ORDER — HALOPERIDOL 5 MG/ML
0.25 INJECTION INTRAMUSCULAR ONCE AS NEEDED
Status: CANCELLED | OUTPATIENT
Start: 2020-01-24 | End: 2020-01-24

## 2020-01-24 RX ORDER — SODIUM CHLORIDE, SODIUM LACTATE, POTASSIUM CHLORIDE, CALCIUM CHLORIDE 600; 310; 30; 20 MG/100ML; MG/100ML; MG/100ML; MG/100ML
INJECTION, SOLUTION INTRAVENOUS CONTINUOUS
Status: CANCELLED | OUTPATIENT
Start: 2020-01-24

## 2020-01-24 RX ORDER — NALOXONE HYDROCHLORIDE 0.4 MG/ML
80 INJECTION, SOLUTION INTRAMUSCULAR; INTRAVENOUS; SUBCUTANEOUS AS NEEDED
Status: CANCELLED | OUTPATIENT
Start: 2020-01-24 | End: 2020-01-24

## 2020-01-24 RX ORDER — PROCHLORPERAZINE EDISYLATE 5 MG/ML
5 INJECTION INTRAMUSCULAR; INTRAVENOUS ONCE AS NEEDED
Status: CANCELLED | OUTPATIENT
Start: 2020-01-24 | End: 2020-01-24

## 2020-01-24 RX ORDER — MORPHINE SULFATE 10 MG/ML
6 INJECTION, SOLUTION INTRAMUSCULAR; INTRAVENOUS EVERY 10 MIN PRN
Status: CANCELLED | OUTPATIENT
Start: 2020-01-24

## 2020-01-24 RX ORDER — HYDROCODONE BITARTRATE AND ACETAMINOPHEN 5; 325 MG/1; MG/1
1 TABLET ORAL AS NEEDED
Status: CANCELLED | OUTPATIENT
Start: 2020-01-24

## 2020-01-24 RX ORDER — FAMOTIDINE 20 MG/1
20 TABLET ORAL ONCE
Status: DISCONTINUED | OUTPATIENT
Start: 2020-01-24 | End: 2020-01-24 | Stop reason: HOSPADM

## 2020-01-24 RX ORDER — LIDOCAINE HYDROCHLORIDE 10 MG/ML
INJECTION, SOLUTION EPIDURAL; INFILTRATION; INTRACAUDAL; PERINEURAL AS NEEDED
Status: DISCONTINUED | OUTPATIENT
Start: 2020-01-24 | End: 2020-01-24 | Stop reason: SURG

## 2020-01-24 RX ORDER — MIDAZOLAM HYDROCHLORIDE 1 MG/ML
INJECTION INTRAMUSCULAR; INTRAVENOUS AS NEEDED
Status: DISCONTINUED | OUTPATIENT
Start: 2020-01-24 | End: 2020-01-24 | Stop reason: SURG

## 2020-01-24 RX ADMIN — LIDOCAINE HYDROCHLORIDE 25 MG: 10 INJECTION, SOLUTION EPIDURAL; INFILTRATION; INTRACAUDAL; PERINEURAL at 12:45:00

## 2020-01-24 RX ADMIN — MIDAZOLAM HYDROCHLORIDE 1 MG: 1 INJECTION INTRAMUSCULAR; INTRAVENOUS at 12:34:00

## 2020-01-24 RX ADMIN — CEFAZOLIN SODIUM/WATER 2 G: 2 G/20 ML SYRINGE (ML) INTRAVENOUS at 12:46:00

## 2020-01-24 NOTE — ANESTHESIA PREPROCEDURE EVALUATION
Anesthesia PreOp Note    HPI:     Sara Horn is a 68year old male who presents for preoperative consultation requested by: Jose De Jesus Hart MD    Date of Surgery: 1/24/2020    Procedure(s):  CATHETER INSERTION PORT-A-CATH  Indication: Malignant melano Past Medical History:   Diagnosis Date   • Asthma 1956    as a child   • Calculus of kidney    • Colon polyp 2018   • Coronary atherosclerosis    • Deaf     left ear   • Deep vein thrombosis ([facility] [address] 2014    left arm x 2; treated with 6 months of anticoagulat • SPINE SURGERY PROCEDURE UNLISTED  2000    lumbar discectomy   • SPLIT GRFT PROC HEAD,FAC,HAND <100CM Left 03/08/2019    heel reconstruction       leuprolide (LUPRON) depot injection 45 mg, 45 mg, Subcutaneous, Q6 Months, Aureliano Zimmerman MD, 45 mg at 10 nitroGLYCERIN 0.4 MG Sublingual SL Tab, Place 1 tablet (0.4 mg total) under the tongue every 5 (five) minutes as needed for Chest pain., Disp: 100 tablet, Rfl: 0, More than a month at Unknown time      lactated ringers infusion, , Intravenous, Continuous, Talks on phone: Not on file        Gets together: Not on file        Attends [employer] service: Not on file        Active member of club or organization: Not on file        Attends meetings of clubs or organizations: Not on file        Relationship s RBC 4.06 01/21/2020    HGB 11.5 (L) 01/21/2020    HCT 34.0 (L) 01/21/2020    MCV 83.7 01/21/2020    MCH 28.3 01/21/2020    MCHC 33.8 01/21/2020    RDW 13.4 01/21/2020    .0 (L) 01/21/2020     Lab Results   Component Value Date     01/21/2020 I have reviewed the above note agree with the plan.   Lungs: Clear   Heart: RHRR  Airway: Adequate   ASA III  NPO>Mn  Plan : GA.      Georgette Shepard MD.

## 2020-01-24 NOTE — ANESTHESIA POSTPROCEDURE EVALUATION
Patient: Cori Spears    Procedure Summary     Date:  01/24/20 Room / Location:  [facility] MAIN OR 02 / [address] [facility] MAIN OR    Anesthesia Start:  1234 Anesthesia Stop:      Procedure:  CATHETER INSERTION PORT-A-CATH (N/A Chest) Diagnosis:       Malignant melanoma, u

## 2020-01-24 NOTE — BRIEF OP NOTE
Pre-Operative Diagnosis: Malignant melanoma, unspecified site [facility]) [C43.9]     Post-Operative Diagnosis: Malignant melanoma, unspecified site [facility]) [C43.9]      Procedure Performed:   Procedure(s):  port insertion    Surgeon(s) and Role:     * Linda Bell

## 2020-01-24 NOTE — INTERVAL H&P NOTE
Pre-op Diagnosis: Malignant melanoma, unspecified site [facility]) [C43.9]    The above referenced H&P was reviewed by Jose Martinez MD on 1/24/2020, the patient was examined and no significant changes have occurred in the patient's condition since the H&P was p

## 2020-01-24 NOTE — H&P
Cancer Center Progress Note     Patient Name: Williams Gibson              YOB: 1950     Medical Record Number: [id number]     CSN: [id number]  Consulting Physician: Andria Ly MD  Referring Physician(s): Chuck Schmitt  Date of Visit wound rotation flap closure with split- thickness skin graft from right thigh by plastic surgeon, Dr. Ajith Lua presents at the time of consultation alone.   He reports being in his usual state of health over the last few months prior associated side effects. He continues to work regularly as a  with his [employer].  He he mentions his LE neuropathy symptom can be troubling at times.            Past Medical History:       Past Medical History:   Diagnosis Date   • Asthma 1956 Right 3/8/2019     Performed by Paul Chery MD at [address] [facility] MAIN OR   • [address] [facility] [place]   2000     lumbar discectomy   • SPLIT GRFT PROC HEAD,FAC,HAND <100CM Left 03/08/2019     heel reconstruction         Family Medical History: Not on file        Physically abused: Not on file        Forced sexual activity: Not on file    Other Topics      Concerns:        Grew up on a farm: Not Asked        History of tanning: Not Asked        Outdoor occupation: Not Asked        Reaction to loc 2  LOSARTAN 100 MG Oral Tab, TAKE 1 TABLET(100 MG) BY MOUTH DAILY, Disp: 90 tablet, Rfl: 1  FUROSEMIDE 40 MG Oral Tab, TAKE 1 TABLET(40 MG) BY MOUTH EVERY MORNING, Disp: 90 tablet, Rfl: 1  Ergocalciferol (VITAMIN D2) 400 units Oral Tab, Take by mouth., Dis prescriped        A comprehensive 14 point review of systems was completed.   Pertinent positives and negatives noted in the the HPI.      Vital Signs:  BP 92/51 (BP Location: Left arm, Patient Position: Sitting, Cuff Size: large)   Pulse 60   Temp 97.9 °F bone scan 9/16/19  =====  CONCLUSION: Whole body planar bone scan demonstrates no evidence of osseous metastatic disease.  There are degenerative changes in the shoulders, hands, knees and feet bilaterally.  There are also degenerative changes in the cervi 4, all peripheral margins free of melanoma, pT3bN1, as there was 1 out of 8 (1/8) local regional lymph nodes identified as there was left popliteal sentinel lymph node involved with metastatic melanoma IHC consistent with Melan-A and SOX-10.       --The pa ng/mL  --pt was noted to have undergone TRUS-Guided prostate biopsy 8/27/2015 by Dr. Raman Wu.  Negative results with no evidence of malignancy in 21 cores.     --TRUS guided prostate biopsy on 9/4/19  --pat has now started definitive treatment wi

## 2020-01-24 NOTE — OPERATIVE REPORT
[facility]    PATIENT'S NAME: Sondra Foote   ATTENDING PHYSICIAN: Hemant Rouse MD   OPERATING PHYSICIAN: Hemant Rouse MD   PATIENT ACCOUNT#:   [id number]    LOCATION:  [place] [address]  MEDICAL RECORD #:   [id number]       [facility] wound and cut to the appropriate length. It was secured to the port. The port flushed and aspirated easily, and its tip was seen to be in the distal superior vena cava. The port was secured to the chest wall with 3-0 Prolene suture.   Surgical wounds wer

## 2020-01-25 NOTE — TELEPHONE ENCOUNTER
Controlled medication pending for review. Please change to phone in, fax, or print script if not being sent electronically.     Last Rx: 10-9-19 # 50 + 1  LOV: 12-3-19    Requested Prescriptions     Pending Prescriptions Disp Refills   • nitroGLYCERIN 0.

## 2020-01-27 RX ORDER — NITROGLYCERIN 0.4 MG/1
0.4 TABLET SUBLINGUAL EVERY 5 MIN PRN
Qty: 100 TABLET | Refills: 1 | Status: SHIPPED | OUTPATIENT
Start: 2020-01-27 | End: 2021-10-04

## 2020-01-27 RX ORDER — ACETAMINOPHEN AND CODEINE PHOSPHATE 300; 30 MG/1; MG/1
1 TABLET ORAL EVERY 4 HOURS PRN
Qty: 50 TABLET | Refills: 1 | Status: SHIPPED | OUTPATIENT
Start: 2020-01-27 | End: 2020-04-09

## 2020-01-27 NOTE — TELEPHONE ENCOUNTER
Per office of Dr. Renny Michel pt has upcoming appointment, pt was referred by Dr. Jorge Ontiveros, states they will be faxing over now referral requesting with codes. Please advise thank you.

## 2020-01-28 ENCOUNTER — TELEPHONE (OUTPATIENT)
Dept: CARDIOLOGY | Age: 70
End: 2020-01-28

## 2020-01-30 ENCOUNTER — TELEPHONE (OUTPATIENT)
Dept: SURGERY | Facility: CLINIC | Age: 70
End: 2020-01-30

## 2020-01-30 DIAGNOSIS — C61 MALIGNANT NEOPLASM OF PROSTATE (HCC): Primary | ICD-10-CM

## 2020-02-03 DIAGNOSIS — K21.9 GASTROESOPHAGEAL REFLUX DISEASE, ESOPHAGITIS PRESENCE NOT SPECIFIED: ICD-10-CM

## 2020-02-03 RX ORDER — PANTOPRAZOLE SODIUM 40 MG/1
TABLET, DELAYED RELEASE ORAL
Qty: 90 TABLET | Refills: 1 | Status: SHIPPED | OUTPATIENT
Start: 2020-02-03 | End: 2020-08-26

## 2020-02-04 RX ORDER — PANTOPRAZOLE SODIUM 40 MG/1
TABLET, DELAYED RELEASE ORAL
Qty: 90 TABLET | Refills: 1 | OUTPATIENT
Start: 2020-02-04

## 2020-02-04 NOTE — TELEPHONE ENCOUNTER
Refill passed per [facility], [facility] protocol.   Refill Protocol Appointment Criteria  · Appointment scheduled in the past 12 months or in the next 3 months  Recent Outpatient Visits            1 week ago Malignant melanoma of left foot ([address]    [facility] 6 months (around 4/23/2020) for ADT, PSA Level.   MILY

## 2020-02-06 ENCOUNTER — HOSPITAL ENCOUNTER (OUTPATIENT)
Dept: NUCLEAR MEDICINE | Facility: HOSPITAL | Age: 70
Discharge: HOME | End: 2020-02-06
Attending: INTERNAL MEDICINE
Payer: MEDICARE

## 2020-02-06 ENCOUNTER — TELEPHONE (OUTPATIENT)
Dept: FAMILY MEDICINE CLINIC | Facility: CLINIC | Age: 70
End: 2020-02-06

## 2020-02-06 DIAGNOSIS — C43.72 MALIGNANT MELANOMA OF LEFT FOOT (HCC): ICD-10-CM

## 2020-02-06 DIAGNOSIS — Z51.12 ENCOUNTER FOR ANTINEOPLASTIC IMMUNOTHERAPY: ICD-10-CM

## 2020-02-06 LAB — GLUCOSE BLDC GLUCOMTR-MCNC: 87 MG/DL (ref 70–99)

## 2020-02-06 PROCEDURE — 78816 PET IMAGE W/CT FULL BODY: CPT | Performed by: INTERNAL MEDICINE

## 2020-02-06 PROCEDURE — 82962 GLUCOSE BLOOD TEST: CPT

## 2020-02-07 NOTE — TELEPHONE ENCOUNTER
Dr. Brito Search office calling back about referral. Pt is scheduled for Monday at 8am  please advise

## 2020-02-11 ENCOUNTER — NURSE ONLY (OUTPATIENT)
Dept: HEMATOLOGY/ONCOLOGY | Facility: HOSPITAL | Age: 70
End: 2020-02-11
Attending: PHYSICIAN ASSISTANT
Payer: MEDICARE

## 2020-02-11 ENCOUNTER — OFFICE VISIT (OUTPATIENT)
Dept: HEMATOLOGY/ONCOLOGY | Facility: HOSPITAL | Age: 70
End: 2020-02-11
Attending: INTERNAL MEDICINE
Payer: MEDICARE

## 2020-02-11 ENCOUNTER — PATIENT MESSAGE (OUTPATIENT)
Dept: FAMILY MEDICINE CLINIC | Facility: CLINIC | Age: 70
End: 2020-02-11

## 2020-02-11 VITALS
RESPIRATION RATE: 18 BRPM | BODY MASS INDEX: 30 KG/M2 | SYSTOLIC BLOOD PRESSURE: 98 MMHG | TEMPERATURE: 98 F | DIASTOLIC BLOOD PRESSURE: 51 MMHG | HEART RATE: 55 BPM | WEIGHT: 237 LBS | OXYGEN SATURATION: 99 %

## 2020-02-11 VITALS
DIASTOLIC BLOOD PRESSURE: 51 MMHG | HEIGHT: 74 IN | OXYGEN SATURATION: 99 % | SYSTOLIC BLOOD PRESSURE: 98 MMHG | TEMPERATURE: 98 F | RESPIRATION RATE: 18 BRPM | BODY MASS INDEX: 30.42 KG/M2 | HEART RATE: 55 BPM | WEIGHT: 237 LBS

## 2020-02-11 DIAGNOSIS — C61 PROSTATE CANCER (HCC): ICD-10-CM

## 2020-02-11 DIAGNOSIS — R19.7 DIARRHEA IN ADULT PATIENT: ICD-10-CM

## 2020-02-11 DIAGNOSIS — C43.72 MALIGNANT MELANOMA OF LEFT FOOT (HCC): Primary | ICD-10-CM

## 2020-02-11 DIAGNOSIS — Z51.12 ENCOUNTER FOR ANTINEOPLASTIC IMMUNOTHERAPY: ICD-10-CM

## 2020-02-11 LAB
ALBUMIN SERPL-MCNC: 3.3 G/DL (ref 3.4–5)
ALBUMIN/GLOB SERPL: 1 {RATIO} (ref 1–2)
ALP LIVER SERPL-CCNC: 74 U/L (ref 45–117)
ALT SERPL-CCNC: 28 U/L (ref 16–61)
ANION GAP SERPL CALC-SCNC: 6 MMOL/L (ref 0–18)
AST SERPL-CCNC: 25 U/L (ref 15–37)
BASOPHILS # BLD AUTO: 0.03 X10(3) UL (ref 0–0.2)
BASOPHILS NFR BLD AUTO: 1 %
BILIRUB SERPL-MCNC: 0.3 MG/DL (ref 0.1–2)
BUN BLD-MCNC: 11 MG/DL (ref 7–18)
BUN/CREAT SERPL: 8.2 (ref 10–20)
CALCIUM BLD-MCNC: 8.7 MG/DL (ref 8.5–10.1)
CHLORIDE SERPL-SCNC: 109 MMOL/L (ref 98–112)
CO2 SERPL-SCNC: 26 MMOL/L (ref 21–32)
CREAT BLD-MCNC: 1.34 MG/DL (ref 0.7–1.3)
DEPRECATED RDW RBC AUTO: 42.8 FL (ref 35.1–46.3)
EOSINOPHIL # BLD AUTO: 0.21 X10(3) UL (ref 0–0.7)
EOSINOPHIL NFR BLD AUTO: 7 %
ERYTHROCYTE [DISTWIDTH] IN BLOOD BY AUTOMATED COUNT: 13.9 % (ref 11–15)
GLOBULIN PLAS-MCNC: 3.4 G/DL (ref 2.8–4.4)
GLUCOSE BLD-MCNC: 93 MG/DL (ref 70–99)
HAV IGM SER QL: 1.7 MG/DL (ref 1.6–2.6)
HCT VFR BLD AUTO: 29.9 % (ref 39–53)
HGB BLD-MCNC: 10.1 G/DL (ref 13–17.5)
IMM GRANULOCYTES # BLD AUTO: 0.01 X10(3) UL (ref 0–1)
IMM GRANULOCYTES NFR BLD: 0.3 %
LYMPHOCYTES # BLD AUTO: 0.28 X10(3) UL (ref 1–4)
LYMPHOCYTES NFR BLD AUTO: 9.3 %
M PROTEIN MFR SERPL ELPH: 6.7 G/DL (ref 6.4–8.2)
MCH RBC QN AUTO: 28.4 PG (ref 26–34)
MCHC RBC AUTO-ENTMCNC: 33.8 G/DL (ref 31–37)
MCV RBC AUTO: 84 FL (ref 80–100)
MONOCYTES # BLD AUTO: 0.49 X10(3) UL (ref 0.1–1)
MONOCYTES NFR BLD AUTO: 16.3 %
NEUTROPHILS # BLD AUTO: 1.98 X10 (3) UL (ref 1.5–7.7)
NEUTROPHILS # BLD AUTO: 1.98 X10(3) UL (ref 1.5–7.7)
NEUTROPHILS NFR BLD AUTO: 66.1 %
OSMOLALITY SERPL CALC.SUM OF ELEC: 291 MOSM/KG (ref 275–295)
PLATELET # BLD AUTO: 148 10(3)UL (ref 150–450)
POTASSIUM SERPL-SCNC: 3.5 MMOL/L (ref 3.5–5.1)
RBC # BLD AUTO: 3.56 X10(6)UL (ref 3.8–5.8)
SODIUM SERPL-SCNC: 141 MMOL/L (ref 136–145)
TSI SER-ACNC: 1.04 MIU/ML (ref 0.36–3.74)
WBC # BLD AUTO: 3 X10(3) UL (ref 4–11)

## 2020-02-11 PROCEDURE — 96413 CHEMO IV INFUSION 1 HR: CPT

## 2020-02-11 PROCEDURE — 99215 OFFICE O/P EST HI 40 MIN: CPT | Performed by: INTERNAL MEDICINE

## 2020-02-11 PROCEDURE — 85025 COMPLETE CBC W/AUTO DIFF WBC: CPT

## 2020-02-11 PROCEDURE — 83735 ASSAY OF MAGNESIUM: CPT

## 2020-02-11 PROCEDURE — 84443 ASSAY THYROID STIM HORMONE: CPT

## 2020-02-11 PROCEDURE — 80053 COMPREHEN METABOLIC PANEL: CPT

## 2020-02-11 RX ORDER — HEPARIN SODIUM (PORCINE) LOCK FLUSH IV SOLN 100 UNIT/ML 100 UNIT/ML
5 SOLUTION INTRAVENOUS ONCE
Status: CANCELLED | OUTPATIENT
Start: 2020-02-11

## 2020-02-11 RX ORDER — 0.9 % SODIUM CHLORIDE 0.9 %
VIAL (ML) INJECTION
Status: DISCONTINUED
Start: 2020-02-11 | End: 2020-02-11

## 2020-02-11 RX ORDER — HEPARIN SODIUM (PORCINE) LOCK FLUSH IV SOLN 100 UNIT/ML 100 UNIT/ML
5 SOLUTION INTRAVENOUS ONCE
Status: COMPLETED | OUTPATIENT
Start: 2020-02-11 | End: 2020-02-11

## 2020-02-11 RX ORDER — 0.9 % SODIUM CHLORIDE 0.9 %
10 VIAL (ML) INJECTION ONCE
Status: CANCELLED | OUTPATIENT
Start: 2020-02-11

## 2020-02-11 RX ORDER — HEPARIN SODIUM (PORCINE) LOCK FLUSH IV SOLN 100 UNIT/ML 100 UNIT/ML
SOLUTION INTRAVENOUS
Status: DISPENSED
Start: 2020-02-11 | End: 2020-02-11

## 2020-02-11 RX ADMIN — HEPARIN SODIUM (PORCINE) LOCK FLUSH IV SOLN 100 UNIT/ML 500 UNITS: 100 SOLUTION INTRAVENOUS at 12:46:00

## 2020-02-11 NOTE — PROGRESS NOTES
Pt here for [facility]. Arrives Ambulating independently    Modifications in dose or schedule: No,    Patient reports he is feeling well, denies any complaints. Port previously accessed from lab draw, positive blood return noted.  Frequency of bloo

## 2020-02-13 NOTE — TELEPHONE ENCOUNTER
Patient called office back. Advised appointment needed for pre-operative clearance. Patient scheduled to see Dr. Rashad Sandoval on 2/17/20 at 1:20 p.m.      Patient will bring in information sheet for test needed for seed implant on March 17, 2020

## 2020-02-14 ENCOUNTER — TELEPHONE (OUTPATIENT)
Dept: SURGERY | Facility: CLINIC | Age: 70
End: 2020-02-14

## 2020-02-14 ENCOUNTER — OFFICE VISIT (OUTPATIENT)
Dept: SURGERY | Facility: CLINIC | Age: 70
End: 2020-02-14
Payer: COMMERCIAL

## 2020-02-14 ENCOUNTER — TELEPHONE (OUTPATIENT)
Dept: OTHER | Age: 70
End: 2020-02-14

## 2020-02-14 VITALS
SYSTOLIC BLOOD PRESSURE: 93 MMHG | HEIGHT: 74 IN | RESPIRATION RATE: 16 BRPM | WEIGHT: 237 LBS | BODY MASS INDEX: 30.42 KG/M2 | DIASTOLIC BLOOD PRESSURE: 55 MMHG | HEART RATE: 58 BPM

## 2020-02-14 DIAGNOSIS — C61 PROSTATE CANCER (HCC): ICD-10-CM

## 2020-02-14 DIAGNOSIS — N43.3 RIGHT HYDROCELE: Primary | ICD-10-CM

## 2020-02-14 DIAGNOSIS — N13.8 BPH WITH OBSTRUCTION/LOWER URINARY TRACT SYMPTOMS: ICD-10-CM

## 2020-02-14 DIAGNOSIS — N40.1 BPH WITH OBSTRUCTION/LOWER URINARY TRACT SYMPTOMS: ICD-10-CM

## 2020-02-14 PROBLEM — N45.3 EPIDIDYMOORCHITIS: Status: RESOLVED | Noted: 2019-12-05 | Resolved: 2020-02-14

## 2020-02-14 PROBLEM — N45.3 EPIDIDYMOORCHITIS: Status: RESOLVED | Noted: 2019-01-01 | Resolved: 2020-01-01

## 2020-02-14 PROCEDURE — G0463 HOSPITAL OUTPT CLINIC VISIT: HCPCS | Performed by: UROLOGY

## 2020-02-14 PROCEDURE — 99213 OFFICE O/P EST LOW 20 MIN: CPT | Performed by: UROLOGY

## 2020-02-14 NOTE — PROGRESS NOTES
[facility] Urology  Follow-Up Visit    HPI: Delvin Ann is a 68year old male presents for a follow up visit. Patient was last seen on 1/8/2020. He is here by himself. INTERVAL HISTORY: Completed EBRT at [facility] 01/30/2020.  Plan for LDR brachyth Administered Lupron 45 mg SubQ 6 months depot. Plan for EBRT + brachy boost with Dr. Kat Donato.    - 12/2019 F/U: Episode of right epididymoorchitis with a Streptococcus agalactiae UTI. Initially treated with Bactrim DS x10 days by ER.   Switched to cefadroxil noted with a positive cough impulse. Neurological: He is alert and oriented to person, place, and time. Skin: Skin is warm and dry. Psychiatric: He has a normal mood and affect.      PATHOLOGY:    TRUS-Guided Prostate biopsy (9/4/19): Prostate romero ADT.     Received first 6-month depot ADT injection in 10/2019. Completed EB[facility] 1/30/[facility]. Plan for LDR brachy boost 03/2020. Post-infectious right hydrocele; currently minimally symptomatic.  Patient would like to defer treatment f

## 2020-02-14 NOTE — TELEPHONE ENCOUNTER
Lisette Dahl RN from Dr. Ash Patel called. Patient is currently at office visit appointment with Dr. Vaishali Carrasco. Patient's blood pressure is 93/55 and at times feels lightheaded. Please advise if any blood pressure medications need to be held.

## 2020-02-14 NOTE — TELEPHONE ENCOUNTER
Pt has next courtney with Dr. Calhoun Lowers 4/23/2020 and a possible injection at that time for his dx prostate cancer. Pt has [employer] and may need a prior auth. Routed to clinical staff. PLAN:  1. Continue Flomax for BPH symptoms.  Continue daily Vitami

## 2020-02-14 NOTE — TELEPHONE ENCOUNTER
Dr. Mary Kay Elias spoke to Dr. Placido Mosqueda and patient was instructed to stop the furosemide and ONLY take if he has swelling in his legs. Patient should only take  1/2 of the Losartan per Elvin Salomon note (see urology note).  Per Elvin Salomon, patient did not

## 2020-02-14 NOTE — TELEPHONE ENCOUNTER
After taking to Dr. Karen Kerr who talked to Dr. Fatimah Salazar. I instructed the patient to stop the furosemide and ONLY take if he has swelling in his legs and take only 1/2 of the Losartan.  Pt did not take the furosemide today and will keep an eye on his legs a

## 2020-02-14 NOTE — TELEPHONE ENCOUNTER
Was asked by Kevyn Leonard to call 's office and ask, based on v.s., and find out which med Mc Emery would like pt to hold until he is seen by PCP on Monday. I phoned 's clinic and spoke with Triage nurse Yinka Dennis.  Provided her with pt

## 2020-02-17 ENCOUNTER — LAB ENCOUNTER (OUTPATIENT)
Dept: LAB | Age: 70
End: 2020-02-17
Attending: FAMILY MEDICINE
Payer: MEDICARE

## 2020-02-17 ENCOUNTER — OFFICE VISIT (OUTPATIENT)
Dept: FAMILY MEDICINE CLINIC | Facility: CLINIC | Age: 70
End: 2020-02-17
Payer: COMMERCIAL

## 2020-02-17 VITALS
DIASTOLIC BLOOD PRESSURE: 63 MMHG | HEART RATE: 60 BPM | RESPIRATION RATE: 17 BRPM | SYSTOLIC BLOOD PRESSURE: 108 MMHG | HEIGHT: 74 IN | WEIGHT: 240 LBS | TEMPERATURE: 98 F | BODY MASS INDEX: 30.8 KG/M2

## 2020-02-17 DIAGNOSIS — Z01.818 PRE-OPERATIVE CLEARANCE: Primary | ICD-10-CM

## 2020-02-17 DIAGNOSIS — I25.2 HISTORY OF MI (MYOCARDIAL INFARCTION): ICD-10-CM

## 2020-02-17 DIAGNOSIS — C61 PROSTATE CA (HCC): ICD-10-CM

## 2020-02-17 PROCEDURE — 36415 COLL VENOUS BLD VENIPUNCTURE: CPT

## 2020-02-17 PROCEDURE — 99214 OFFICE O/P EST MOD 30 MIN: CPT | Performed by: FAMILY MEDICINE

## 2020-02-17 PROCEDURE — 93000 ELECTROCARDIOGRAM COMPLETE: CPT | Performed by: FAMILY MEDICINE

## 2020-02-17 NOTE — PROGRESS NOTES
HPI:    Patient ID: Kala Jefferson is a 68year old male. Patient is a 78-year-old -American male well-known to the clinic who is here today for medical clearance for anticipated seed implant for the treatment of prostate CA.   On today the adeline Exam    Constitutional: He is oriented to person, place, and time. He appears well-developed and well-nourished. No distress.    HENT:   Right Ear: Tympanic membrane and ear canal normal.   Left Ear: Tympanic membrane and ear canal normal.   Nose: Nose norm

## 2020-02-17 NOTE — PATIENT INSTRUCTIONS
Preoperative clearance labs have been ordered along with an updated EKG. Medication reviewed and renewed where needed and appropriate. Comply with medications. Encouraged physical fitness and daily physical activity daily.   Will likely require need card

## 2020-02-17 NOTE — PROGRESS NOTES
Cancer Center Progress Note    Patient Name: Bishop Hunt   YOB: 1950   Medical Record Number: [id number]   CSN: [id number]  Consulting Physician: Santino Estrada MD  Referring Physician(s): Wilton Valenzuela  Date of Visit: 2/11/2020    ALLEN closure with split- thickness skin graft from right thigh by plastic surgeon, Dr. Sage Waldrop. Kushal Werner presents at the time of consultation alone.   He reports being in his usual state of health over the last few months prior to diagnosis or curre He continues to work regularly as a  with his [employer]. He he mentions his LE neuropathy symptom can be troubling at times.          Past Medical History:  Past Medical History:   Diagnosis Date   • Asthma 1956    as a child   • Calculus of kid NODE BIOPSY Left 03/08/2019    inguinal and popliteal   • SENTINEL LYMPH NODE BIOPSY Left 3/8/2019    Performed by Judah Ely MD at [address]   • SKIN GRAFT SPLIT THICKNESS Right 3/8/2019    Performed by Liza Arellano MD at [address] [facility] [address]   • S Relationship status: Not on file      Intimate partner violence:        Fear of current or ex partner: Not on file        Emotionally abused: Not on file        Physically abused: Not on file        Forced sexual activity: Not on file    Other Topics 0.4 MG Sublingual SL Tab, Place 1 tablet (0.4 mg total) under the tongue every 5 (five) minutes as needed for Chest pain., Disp: 100 tablet, Rfl: 1  Acetaminophen-Codeine #3 300-30 MG Oral Tab, Take 1 tablet by mouth every 4 (four) hours as needed for Pain lymphadenopathy. Musculoskeletal: Negative for myalgias, arthralgias, muscle weakness. Neurological: Negative for headaches, dizziness, speech problems, gait problems and weakness.  +electric pulsations in the left foot area post operatively; improving on  02/11/2020 09:08 AM    K 3.5 02/11/2020 09:08 AM     02/11/2020 09:08 AM    CO2 26.0 02/11/2020 09:08 AM    ALKPHO 74 02/11/2020 09:08 AM    AST 25 02/11/2020 09:08 AM    ALT 28 02/11/2020 09:08 AM       Imaging:      PET/CT scan 2/06/2020  CO pembrolizumab (KEYTRUDA) 200 mg         in sodium chloride 0.9% 108 mL chemo-IVPB    (Z51.12) Encounter for antineoplastic immunotherapy    (C61) [facility]  Plan: MAGNESIUM    (R19.7) Diarrhea in adult patient  Plan: MAGNESIUM    68year old M shows no definitive evidence of active malignancy. His imaging can be every 3-12 months on adjuvant therapy or if there are signs suggestive of progression of disease.  Clinically doing well, no signs concerning for progression of disease.    --he underwent today              Blanca Rust MD  [facility]  [address] [place] [address]  [address] [facility]

## 2020-02-18 LAB
ABSOLUTE BASOPHILS: 30 CELLS/UL (ref 0–200)
ABSOLUTE EOSINOPHILS: 151 CELLS/UL (ref 15–500)
ABSOLUTE LYMPHOCYTES: 316 CELLS/UL (ref 850–3900)
ABSOLUTE MONOCYTES: 518 CELLS/UL (ref 200–950)
ABSOLUTE NEUTROPHILS: 1685 CELLS/UL (ref 1500–7800)
BASOPHILS: 1.1 %
BILIRUBIN: NEGATIVE
BUN: 16 MG/DL (ref 7–25)
CALCIUM: 9.3 MG/DL (ref 8.6–10.3)
CARBON DIOXIDE: 27 MMOL/L (ref 20–32)
CHLORIDE: 104 MMOL/L (ref 98–110)
COLOR: YELLOW
CREATININE: 1.02 MG/DL (ref 0.7–1.18)
EGFR IF AFRICN AM: 85 ML/MIN/1.73M2
EGFR IF NONAFRICN AM: 74 ML/MIN/1.73M2
EOSINOPHILS: 5.6 %
GLUCOSE: 89 MG/DL (ref 65–99)
GLUCOSE: NEGATIVE
HEMATOCRIT: 29.9 % (ref 38.5–50)
HEMOGLOBIN: 10.6 G/DL (ref 13.2–17.1)
INR: 1
KETONES: NEGATIVE
LEUKOCYTE ESTERASE: NEGATIVE
LYMPHOCYTES: 11.7 %
MCH: 29.2 PG (ref 27–33)
MCHC: 35.5 G/DL (ref 32–36)
MCV: 82.4 FL (ref 80–100)
MONOCYTES: 19.2 %
MPV: 9 FL (ref 7.5–12.5)
NEUTROPHILS: 62.4 %
NITRITE: NEGATIVE
PH: 5.5 (ref 5–8)
PLATELET COUNT: 168 THOUSAND/UL (ref 140–400)
POTASSIUM: 3.8 MMOL/L (ref 3.5–5.3)
PROTEIN: NEGATIVE
PT: 9.9 SEC (ref 9–11.5)
RDW: 14.5 % (ref 11–15)
RED BLOOD CELL COUNT: 3.63 MILLION/UL (ref 4.2–5.8)
SODIUM: 139 MMOL/L (ref 135–146)
SPECIFIC GRAVITY: 1.02 (ref 1–1.03)
WHITE BLOOD CELL COUNT: 2.7 THOUSAND/UL (ref 3.8–10.8)

## 2020-02-18 NOTE — TELEPHONE ENCOUNTER
Phoned Pt's insurance [phone] and spoke with rep , Zak Peters. He transferred my call to [phone] and I was once again, placed in the voice mail loop. This process took appx 20 minutes. I attempted  [employer] as voice mail loop mentioned [employer].  I p

## 2020-02-19 NOTE — TELEPHONE ENCOUNTER
Attempted several times to use web site, however will not allow me to access, despite established account.

## 2020-02-20 ENCOUNTER — HOSPITAL ENCOUNTER (OUTPATIENT)
Dept: GENERAL RADIOLOGY | Facility: HOSPITAL | Age: 70
Discharge: HOME | End: 2020-02-20
Attending: FAMILY MEDICINE
Payer: MEDICARE

## 2020-02-20 DIAGNOSIS — Z01.818 PRE-OPERATIVE CLEARANCE: ICD-10-CM

## 2020-02-20 PROCEDURE — 71046 X-RAY EXAM CHEST 2 VIEWS: CPT | Performed by: FAMILY MEDICINE

## 2020-02-28 ENCOUNTER — TELEPHONE (OUTPATIENT)
Dept: SURGERY | Facility: CLINIC | Age: 70
End: 2020-02-28

## 2020-02-28 DIAGNOSIS — C61 MALIGNANT NEOPLASM OF PROSTATE (HCC): Primary | ICD-10-CM

## 2020-03-02 NOTE — TELEPHONE ENCOUNTER
Started case online via [facility] / Manfred Valentine but the system froze and crashed before I could complete entry. System is now down. Will attempt again later.

## 2020-03-03 ENCOUNTER — OFFICE VISIT (OUTPATIENT)
Dept: HEMATOLOGY/ONCOLOGY | Facility: HOSPITAL | Age: 70
End: 2020-03-03
Attending: PHYSICIAN ASSISTANT
Payer: MEDICARE

## 2020-03-03 ENCOUNTER — TELEPHONE (OUTPATIENT)
Dept: FAMILY MEDICINE CLINIC | Facility: CLINIC | Age: 70
End: 2020-03-03

## 2020-03-03 ENCOUNTER — OFFICE VISIT (OUTPATIENT)
Dept: HEMATOLOGY/ONCOLOGY | Facility: HOSPITAL | Age: 70
End: 2020-03-03
Attending: INTERNAL MEDICINE
Payer: MEDICARE

## 2020-03-03 VITALS
HEIGHT: 74 IN | OXYGEN SATURATION: 99 % | DIASTOLIC BLOOD PRESSURE: 57 MMHG | BODY MASS INDEX: 31.18 KG/M2 | SYSTOLIC BLOOD PRESSURE: 106 MMHG | WEIGHT: 243 LBS | TEMPERATURE: 98 F | HEART RATE: 59 BPM | RESPIRATION RATE: 18 BRPM

## 2020-03-03 VITALS
HEIGHT: 74 IN | RESPIRATION RATE: 18 BRPM | WEIGHT: 243 LBS | DIASTOLIC BLOOD PRESSURE: 57 MMHG | SYSTOLIC BLOOD PRESSURE: 106 MMHG | HEART RATE: 59 BPM | TEMPERATURE: 98 F | BODY MASS INDEX: 31.18 KG/M2 | OXYGEN SATURATION: 99 %

## 2020-03-03 DIAGNOSIS — C43.72 MALIGNANT MELANOMA OF LEFT FOOT (HCC): Primary | ICD-10-CM

## 2020-03-03 DIAGNOSIS — Z51.12 ENCOUNTER FOR ANTINEOPLASTIC IMMUNOTHERAPY: ICD-10-CM

## 2020-03-03 DIAGNOSIS — C61 PROSTATE CANCER (HCC): ICD-10-CM

## 2020-03-03 LAB
ALBUMIN SERPL-MCNC: 3.3 G/DL (ref 3.4–5)
ALBUMIN/GLOB SERPL: 1 {RATIO} (ref 1–2)
ALP LIVER SERPL-CCNC: 71 U/L (ref 45–117)
ALT SERPL-CCNC: 24 U/L (ref 16–61)
ANION GAP SERPL CALC-SCNC: 4 MMOL/L (ref 0–18)
AST SERPL-CCNC: 19 U/L (ref 15–37)
BASOPHILS # BLD AUTO: 0.02 X10(3) UL (ref 0–0.2)
BASOPHILS NFR BLD AUTO: 0.5 %
BILIRUB SERPL-MCNC: 0.3 MG/DL (ref 0.1–2)
BUN BLD-MCNC: 13 MG/DL (ref 7–18)
BUN/CREAT SERPL: 11.8 (ref 10–20)
CALCIUM BLD-MCNC: 8.8 MG/DL (ref 8.5–10.1)
CHLORIDE SERPL-SCNC: 110 MMOL/L (ref 98–112)
CO2 SERPL-SCNC: 27 MMOL/L (ref 21–32)
CREAT BLD-MCNC: 1.1 MG/DL (ref 0.7–1.3)
DEPRECATED RDW RBC AUTO: 44.4 FL (ref 35.1–46.3)
EOSINOPHIL # BLD AUTO: 0.22 X10(3) UL (ref 0–0.7)
EOSINOPHIL NFR BLD AUTO: 6 %
ERYTHROCYTE [DISTWIDTH] IN BLOOD BY AUTOMATED COUNT: 13.9 % (ref 11–15)
GLOBULIN PLAS-MCNC: 3.3 G/DL (ref 2.8–4.4)
GLUCOSE BLD-MCNC: 117 MG/DL (ref 70–99)
HCT VFR BLD AUTO: 28.2 % (ref 39–53)
HGB BLD-MCNC: 9.4 G/DL (ref 13–17.5)
IMM GRANULOCYTES # BLD AUTO: 0.02 X10(3) UL (ref 0–1)
IMM GRANULOCYTES NFR BLD: 0.5 %
LYMPHOCYTES # BLD AUTO: 0.4 X10(3) UL (ref 1–4)
LYMPHOCYTES NFR BLD AUTO: 11 %
M PROTEIN MFR SERPL ELPH: 6.6 G/DL (ref 6.4–8.2)
MCH RBC QN AUTO: 29 PG (ref 26–34)
MCHC RBC AUTO-ENTMCNC: 33.3 G/DL (ref 31–37)
MCV RBC AUTO: 87 FL (ref 80–100)
MONOCYTES # BLD AUTO: 0.47 X10(3) UL (ref 0.1–1)
MONOCYTES NFR BLD AUTO: 12.9 %
NEUTROPHILS # BLD AUTO: 2.52 X10 (3) UL (ref 1.5–7.7)
NEUTROPHILS # BLD AUTO: 2.52 X10(3) UL (ref 1.5–7.7)
NEUTROPHILS NFR BLD AUTO: 69.1 %
OSMOLALITY SERPL CALC.SUM OF ELEC: 293 MOSM/KG (ref 275–295)
PATIENT FASTING Y/N/NP: NO
PLATELET # BLD AUTO: 162 10(3)UL (ref 150–450)
POTASSIUM SERPL-SCNC: 3.6 MMOL/L (ref 3.5–5.1)
RBC # BLD AUTO: 3.24 X10(6)UL (ref 3.8–5.8)
SODIUM SERPL-SCNC: 141 MMOL/L (ref 136–145)
TSI SER-ACNC: 1.2 MIU/ML (ref 0.36–3.74)
WBC # BLD AUTO: 3.7 X10(3) UL (ref 4–11)

## 2020-03-03 PROCEDURE — 84443 ASSAY THYROID STIM HORMONE: CPT

## 2020-03-03 PROCEDURE — 85025 COMPLETE CBC W/AUTO DIFF WBC: CPT

## 2020-03-03 PROCEDURE — 96413 CHEMO IV INFUSION 1 HR: CPT

## 2020-03-03 PROCEDURE — 99215 OFFICE O/P EST HI 40 MIN: CPT | Performed by: INTERNAL MEDICINE

## 2020-03-03 PROCEDURE — 80053 COMPREHEN METABOLIC PANEL: CPT

## 2020-03-03 RX ORDER — 0.9 % SODIUM CHLORIDE 0.9 %
VIAL (ML) INJECTION
Status: DISCONTINUED
Start: 2020-03-03 | End: 2020-03-03

## 2020-03-03 RX ORDER — HEPARIN SODIUM (PORCINE) LOCK FLUSH IV SOLN 100 UNIT/ML 100 UNIT/ML
SOLUTION INTRAVENOUS
Status: COMPLETED
Start: 2020-03-03 | End: 2020-03-03

## 2020-03-03 RX ADMIN — HEPARIN SODIUM (PORCINE) LOCK FLUSH IV SOLN 100 UNIT/ML 500 UNITS: 100 SOLUTION INTRAVENOUS at 12:26:00

## 2020-03-03 NOTE — TELEPHONE ENCOUNTER
Thanks for request,    Will check with [facility] for authorization    [address] [facility]

## 2020-03-03 NOTE — PROGRESS NOTES
Cancer Center Progress Note    Patient Name: Zoë Vargas   YOB: 1950   Medical Record Number: [id number]   CSN: [id number]  Consulting Physician: Teo Barrientos MD  Referring Physician(s): Stephan Lomax  Date of Visit: 3/03/2020    ALLEN closure with split- thickness skin graft from right thigh by plastic surgeon, Dr. Chayo Solomon. Derricktorijorge Kiserbarbara presents at the time of consultation alone.   He reports being in his usual state of health over the last few months prior to diagnosis or curre effects. He continues to work regularly as a  with his [employer]. He he mentions his LE neuropathy symptom can be troubling at times.          Past Medical History:  Past Medical History:   Diagnosis Date   • Asthma 1958    as a child   • Marian SENT LYMPH NODE BIOPSY Left 03/08/2019    inguinal and popliteal   • SENTINEL LYMPH NODE BIOPSY Left 3/8/2019    Performed by Kj Mensah MD at [address] [place] [address] OR   • SKIN GRAFT SPLIT THICKNESS Right 3/8/2019    Performed by Jennifer Vick MD at [address] [place] [address] Not on file        Relationship status: Not on file      Intimate partner violence:        Fear of current or ex partner: Not on file        Emotionally abused: Not on file        Physically abused: Not on file        Forced sexual activity: Not on file tablet, Rfl: 1  Acetaminophen-Codeine #3 300-30 MG Oral Tab, Take 1 tablet by mouth every 4 (four) hours as needed for Pain., Disp: 50 tablet, Rfl: 1  METOPROLOL SUCCINATE  MG Oral Tablet 24 Hr, TAKE 1& 1/2 TABLETS(150 MG TOTAL) BY MOUTH DAILY, Disp: speech problems, gait problems and weakness. +electric pulsations in the left foot area post operatively; improving on gabapentin recently prescriped      A comprehensive 14 point review of systems was completed.   Pertinent positives and negatives noted in 09:21 AM    AST 19 03/03/2020 09:21 AM    ALT 24 03/03/2020 09:21 AM       Imaging:      PET/CT scan 2/06/2020  CONCLUSION:     1. There is history of malignant melanoma, excised from the left heel with prior left popliteal maxwell biopsy.   There is a band lymph node involvement; pT3bN1      Plan:    1.) Malignant Melanoma -pT3bN1    --underwent wide excision of his left heel melanoma measuring 5.5 cm in diameter, with left popliteal fossa lymph node biopsy as well as left inguinal sentinel lymph node biopsy to radiation-induced diarrhea for treatment of his prostate cancer    --patient will proceed with cycle 15    --His adjuvant systemic therapy is for up to 1 year after his surgery date. --pt has clinical M0 disease via PET/CT scan.  We have reviewed his

## 2020-03-03 NOTE — TELEPHONE ENCOUNTER
Keeley Mata from Dr Karla Maldonado office requesting copy of EKG faxed to them.      Fax  [phone]

## 2020-03-03 NOTE — PROGRESS NOTES
Sundeep Anders to infusion following LAB and MD visit for Martell Gamboa Arrives Ambulating independently accompanied by self. Modifications in dose or schedule: No.    Patient reports he is feeling well, denies any complaints.  Denies N/V/D, denies chil

## 2020-03-10 RX ORDER — POTASSIUM CHLORIDE 750 MG/1
TABLET, EXTENDED RELEASE ORAL
Qty: 90 TABLET | Refills: 0 | Status: SHIPPED | OUTPATIENT
Start: 2020-03-10 | End: 2020-06-15

## 2020-03-10 NOTE — TELEPHONE ENCOUNTER
Review pended refill request as it does not fall under a protocol.     Last Rx: 12/13/19  LOV: 2/17/20  Ref Range & Units 3/3/20  9:21 AM   Glucose  70 - 99 mg/dL 117High     Sodium  136 - 145 mmol/L 141    Potassium  3.5 - 5.1 mmol/L 3.6          Requested

## 2020-03-16 ENCOUNTER — TELEPHONE (OUTPATIENT)
Dept: SURGERY | Facility: CLINIC | Age: 70
End: 2020-03-16

## 2020-03-16 NOTE — TELEPHONE ENCOUNTER
Spoke with Binta Rao [facility], patient' procedure is approved see referral #[id number].

## 2020-03-24 ENCOUNTER — OFFICE VISIT (OUTPATIENT)
Dept: HEMATOLOGY/ONCOLOGY | Facility: HOSPITAL | Age: 70
End: 2020-03-24
Attending: PHYSICIAN ASSISTANT
Payer: MEDICARE

## 2020-03-24 ENCOUNTER — OFFICE VISIT (OUTPATIENT)
Dept: HEMATOLOGY/ONCOLOGY | Facility: HOSPITAL | Age: 70
End: 2020-03-24
Attending: INTERNAL MEDICINE
Payer: MEDICARE

## 2020-03-24 VITALS
HEART RATE: 52 BPM | OXYGEN SATURATION: 100 % | SYSTOLIC BLOOD PRESSURE: 121 MMHG | DIASTOLIC BLOOD PRESSURE: 66 MMHG | TEMPERATURE: 98 F | HEIGHT: 74 IN | WEIGHT: 237 LBS | RESPIRATION RATE: 18 BRPM | BODY MASS INDEX: 30.42 KG/M2

## 2020-03-24 VITALS
OXYGEN SATURATION: 100 % | RESPIRATION RATE: 18 BRPM | WEIGHT: 237 LBS | TEMPERATURE: 98 F | HEIGHT: 74 IN | BODY MASS INDEX: 30.42 KG/M2 | HEART RATE: 52 BPM | SYSTOLIC BLOOD PRESSURE: 121 MMHG | DIASTOLIC BLOOD PRESSURE: 66 MMHG

## 2020-03-24 DIAGNOSIS — G62.9 NEUROPATHY: ICD-10-CM

## 2020-03-24 DIAGNOSIS — C61 PROSTATE CANCER (HCC): ICD-10-CM

## 2020-03-24 DIAGNOSIS — C43.72 MALIGNANT MELANOMA OF LEFT FOOT (HCC): Primary | ICD-10-CM

## 2020-03-24 DIAGNOSIS — Z51.12 ENCOUNTER FOR ANTINEOPLASTIC IMMUNOTHERAPY: ICD-10-CM

## 2020-03-24 LAB
ALBUMIN SERPL-MCNC: 3.5 G/DL (ref 3.4–5)
ALBUMIN/GLOB SERPL: 1 {RATIO} (ref 1–2)
ALP LIVER SERPL-CCNC: 74 U/L (ref 45–117)
ALT SERPL-CCNC: 28 U/L (ref 16–61)
ANION GAP SERPL CALC-SCNC: 6 MMOL/L (ref 0–18)
AST SERPL-CCNC: 17 U/L (ref 15–37)
BASOPHILS # BLD AUTO: 0.03 X10(3) UL (ref 0–0.2)
BASOPHILS NFR BLD AUTO: 0.5 %
BILIRUB SERPL-MCNC: 0.4 MG/DL (ref 0.1–2)
BUN BLD-MCNC: 22 MG/DL (ref 7–18)
BUN/CREAT SERPL: 21.4 (ref 10–20)
CALCIUM BLD-MCNC: 9.2 MG/DL (ref 8.5–10.1)
CHLORIDE SERPL-SCNC: 108 MMOL/L (ref 98–112)
CO2 SERPL-SCNC: 27 MMOL/L (ref 21–32)
CREAT BLD-MCNC: 1.03 MG/DL (ref 0.7–1.3)
DEPRECATED RDW RBC AUTO: 44.5 FL (ref 35.1–46.3)
EOSINOPHIL # BLD AUTO: 0.22 X10(3) UL (ref 0–0.7)
EOSINOPHIL NFR BLD AUTO: 4 %
ERYTHROCYTE [DISTWIDTH] IN BLOOD BY AUTOMATED COUNT: 13.9 % (ref 11–15)
GLOBULIN PLAS-MCNC: 3.5 G/DL (ref 2.8–4.4)
GLUCOSE BLD-MCNC: 91 MG/DL (ref 70–99)
HCT VFR BLD AUTO: 30.8 % (ref 39–53)
HGB BLD-MCNC: 10.3 G/DL (ref 13–17.5)
IMM GRANULOCYTES # BLD AUTO: 0.03 X10(3) UL (ref 0–1)
IMM GRANULOCYTES NFR BLD: 0.5 %
LYMPHOCYTES # BLD AUTO: 0.52 X10(3) UL (ref 1–4)
LYMPHOCYTES NFR BLD AUTO: 9.4 %
M PROTEIN MFR SERPL ELPH: 7 G/DL (ref 6.4–8.2)
MCH RBC QN AUTO: 29.4 PG (ref 26–34)
MCHC RBC AUTO-ENTMCNC: 33.4 G/DL (ref 31–37)
MCV RBC AUTO: 88 FL (ref 80–100)
MONOCYTES # BLD AUTO: 0.65 X10(3) UL (ref 0.1–1)
MONOCYTES NFR BLD AUTO: 11.8 %
NEUTROPHILS # BLD AUTO: 4.08 X10 (3) UL (ref 1.5–7.7)
NEUTROPHILS # BLD AUTO: 4.08 X10(3) UL (ref 1.5–7.7)
NEUTROPHILS NFR BLD AUTO: 73.8 %
OSMOLALITY SERPL CALC.SUM OF ELEC: 295 MOSM/KG (ref 275–295)
PATIENT FASTING Y/N/NP: NO
PLATELET # BLD AUTO: 174 10(3)UL (ref 150–450)
POTASSIUM SERPL-SCNC: 3.9 MMOL/L (ref 3.5–5.1)
RBC # BLD AUTO: 3.5 X10(6)UL (ref 3.8–5.8)
SODIUM SERPL-SCNC: 141 MMOL/L (ref 136–145)
TSI SER-ACNC: 0.88 MIU/ML (ref 0.36–3.74)
WBC # BLD AUTO: 5.5 X10(3) UL (ref 4–11)

## 2020-03-24 PROCEDURE — 80053 COMPREHEN METABOLIC PANEL: CPT

## 2020-03-24 PROCEDURE — 85025 COMPLETE CBC W/AUTO DIFF WBC: CPT

## 2020-03-24 PROCEDURE — 84443 ASSAY THYROID STIM HORMONE: CPT

## 2020-03-24 PROCEDURE — 99215 OFFICE O/P EST HI 40 MIN: CPT | Performed by: INTERNAL MEDICINE

## 2020-03-24 PROCEDURE — 96413 CHEMO IV INFUSION 1 HR: CPT

## 2020-03-24 RX ORDER — HEPARIN SODIUM (PORCINE) LOCK FLUSH IV SOLN 100 UNIT/ML 100 UNIT/ML
SOLUTION INTRAVENOUS
Status: COMPLETED
Start: 2020-03-24 | End: 2020-03-24

## 2020-03-24 RX ORDER — 0.9 % SODIUM CHLORIDE 0.9 %
10 VIAL (ML) INJECTION ONCE
Status: DISCONTINUED | OUTPATIENT
Start: 2020-03-24 | End: 2020-03-24

## 2020-03-24 RX ORDER — 0.9 % SODIUM CHLORIDE 0.9 %
VIAL (ML) INJECTION
Status: DISCONTINUED
Start: 2020-03-24 | End: 2020-03-24

## 2020-03-24 RX ORDER — HEPARIN SODIUM (PORCINE) LOCK FLUSH IV SOLN 100 UNIT/ML 100 UNIT/ML
5 SOLUTION INTRAVENOUS ONCE
Status: COMPLETED | OUTPATIENT
Start: 2020-03-24 | End: 2020-03-24

## 2020-03-24 RX ORDER — 0.9 % SODIUM CHLORIDE 0.9 %
10 VIAL (ML) INJECTION ONCE
Status: CANCELLED | OUTPATIENT
Start: 2020-03-24

## 2020-03-24 RX ORDER — HEPARIN SODIUM (PORCINE) LOCK FLUSH IV SOLN 100 UNIT/ML 100 UNIT/ML
5 SOLUTION INTRAVENOUS ONCE
Status: CANCELLED | OUTPATIENT
Start: 2020-03-24

## 2020-03-24 RX ADMIN — HEPARIN SODIUM (PORCINE) LOCK FLUSH IV SOLN 100 UNIT/ML 500 UNITS: 100 SOLUTION INTRAVENOUS at 12:58:00

## 2020-03-24 NOTE — PROGRESS NOTES
Thomas Chew to infusion following LAB and MD visit for [address]. Arrives Ambulating independently accompanied by self. Modifications in dose or schedule: No.    Patient reports he is feeling well, denies any complaints.  Denies N/V/D, denies chil

## 2020-03-24 NOTE — PROGRESS NOTES
Cancer Center Progress Note    Patient Name: Phu Canela   YOB: 1950   Medical Record Number: [id number]   CSN: [id number]  Consulting Physician: Jonathan Kraft MD  Referring Physician(s): Adelina Gonsalez  Date of Visit: 3/24/2020    ALLEN closure with split- thickness skin graft from right thigh by plastic surgeon, Dr. Roxi Bishop. Alida Rile presents at the time of consultation alone.   He reports being in his usual state of health over the last few months prior to diagnosis or curre mentions mild occasional non productive cough which is unchanged. Patient some expected hot flashes from endocrine therapy for prostate cancer management with his urologist. He denies any treatment associated side effects.   He continues to work regular TRUNK,ARM,LEG Left 03/08/2019    heel melanoma   • EXCISION OF MELANOMA Left 3/8/2019    Performed by Palak Snow MD at [facility] OR   • PROSTATE BRACHYTHERAPY N/A 3/17/2020    Performed by Ruiz Chun MD at [facility] on file    Relationships      Social connections:        Talks on phone: Not on file        Gets together: Not on file        Attends Jew service: Not on file        Active member of club or organization: Not on file        Attends meetings of clubs Medications:  methylPREDNISolone 4 MG Oral Tab, Take 1 tablet (4 mg total) by mouth daily. Medrol 4 mg  Take 1 tablet TID x 2 days; Take 1 tablet BID x 5 days;  Take 1 tablet daily x 5 days, Disp: 21 tablet, Rfl: 0  Cetirizine HCl (ZYRTEC ALLERGY) 10 MG Ora Negative for visual disturbance, irritation and redness. Respiratory: Negative for cough, hemoptysis, chest pain, dyspnea on exertion.   Gastrointestinal: Negative for dysphagia, odynophagia, reflux symptoms, nausea, vomiting, change in bowel habits, diarr AM    MCH 29.4 03/24/2020 09:08 AM    MCHC 33.4 03/24/2020 09:08 AM    RDW 13.9 03/24/2020 09:08 AM    NEPRELIM 4.08 03/24/2020 09:08 AM    .0 03/24/2020 09:08 AM       Lab Results   Component Value Date/Time    GLU 91 03/24/2020 09:08 AM    BUN 22 proximal and distal colon likely representing peristalsis at time of imaging however recommend colonoscopy if not recently performed. 7.  Evidence for prior granulomatous disease exposure within the mediastinum/right hilum, and spleen.   Coronary atheros if there are signs suggestive of progression of disease.  Clinically doing well, no signs concerning for progression of disease.    --he underwent repeat PET/CT scan prior to cycle 14 on 2/6/2020 decreased FDG activity at the left heel, representing likely   [address], [facility]

## 2020-03-25 RX ORDER — GABAPENTIN 300 MG/1
CAPSULE ORAL
Qty: 60 CAPSULE | Refills: 3 | Status: SHIPPED | OUTPATIENT
Start: 2020-03-25 | End: 2020-04-09

## 2020-03-31 ENCOUNTER — E-ADVICE (OUTPATIENT)
Dept: CARDIOLOGY | Age: 70
End: 2020-03-31

## 2020-04-01 ENCOUNTER — TELEPHONE (OUTPATIENT)
Dept: CARDIOLOGY | Age: 70
End: 2020-04-01

## 2020-04-09 ENCOUNTER — TELEPHONE (OUTPATIENT)
Dept: HEMATOLOGY/ONCOLOGY | Facility: HOSPITAL | Age: 70
End: 2020-04-09

## 2020-04-09 DIAGNOSIS — C43.72 MELANOMA OF FOOT, LEFT (HCC): ICD-10-CM

## 2020-04-09 DIAGNOSIS — G62.9 NEUROPATHY: ICD-10-CM

## 2020-04-09 DIAGNOSIS — M79.672 LEFT FOOT PAIN: ICD-10-CM

## 2020-04-09 RX ORDER — GABAPENTIN 300 MG/1
CAPSULE ORAL
Qty: 270 CAPSULE | Refills: 3 | Status: SHIPPED | OUTPATIENT
Start: 2020-04-09 | End: 2021-01-04

## 2020-04-09 RX ORDER — ACETAMINOPHEN AND CODEINE PHOSPHATE 300; 30 MG/1; MG/1
1 TABLET ORAL EVERY 4 HOURS PRN
Qty: 100 TABLET | Refills: 0 | Status: SHIPPED | OUTPATIENT
Start: 2020-04-09 | End: 2020-05-05 | Stop reason: ALTCHOICE

## 2020-04-09 NOTE — TELEPHONE ENCOUNTER
Please note - patient's Eligard injections have not yet been approved. Case is pending with [facility] / [employer] and additional clinical information is needed. Please advise on the patient's staging at initial diagnosis. Thank you!

## 2020-04-09 NOTE — TELEPHONE ENCOUNTER
Patient reports intermittent \"shooting pain\" in his heel. Currently on gabapentin 600 mg at bedtime. Uses 1-2 Tylenol #3 in 24 hours, which is effective. He ran out of Tylenol #3.     Increase gabapentin 300 mg to ONE capsule in the morning and continu

## 2020-04-14 ENCOUNTER — OFFICE VISIT (OUTPATIENT)
Dept: HEMATOLOGY/ONCOLOGY | Facility: HOSPITAL | Age: 70
End: 2020-04-14
Attending: PHYSICIAN ASSISTANT
Payer: MEDICARE

## 2020-04-14 ENCOUNTER — ANCILLARY PROCEDURE (OUTPATIENT)
Dept: CARDIOLOGY | Age: 70
End: 2020-04-14
Attending: INTERNAL MEDICINE

## 2020-04-14 ENCOUNTER — OFFICE VISIT (OUTPATIENT)
Dept: HEMATOLOGY/ONCOLOGY | Facility: HOSPITAL | Age: 70
End: 2020-04-14
Attending: INTERNAL MEDICINE
Payer: MEDICARE

## 2020-04-14 ENCOUNTER — ANCILLARY ORDERS (OUTPATIENT)
Dept: CARDIOLOGY | Age: 70
End: 2020-04-14

## 2020-04-14 VITALS
SYSTOLIC BLOOD PRESSURE: 105 MMHG | WEIGHT: 242 LBS | DIASTOLIC BLOOD PRESSURE: 58 MMHG | RESPIRATION RATE: 18 BRPM | HEIGHT: 74 IN | TEMPERATURE: 98 F | OXYGEN SATURATION: 99 % | BODY MASS INDEX: 31.06 KG/M2 | HEART RATE: 54 BPM

## 2020-04-14 VITALS
OXYGEN SATURATION: 99 % | TEMPERATURE: 98 F | WEIGHT: 242 LBS | DIASTOLIC BLOOD PRESSURE: 58 MMHG | HEART RATE: 54 BPM | RESPIRATION RATE: 18 BRPM | BODY MASS INDEX: 31 KG/M2 | SYSTOLIC BLOOD PRESSURE: 105 MMHG

## 2020-04-14 DIAGNOSIS — Z95.810 ICD (IMPLANTABLE CARDIOVERTER-DEFIBRILLATOR) IN PLACE: ICD-10-CM

## 2020-04-14 DIAGNOSIS — C43.72 MALIGNANT MELANOMA OF LEFT FOOT (HCC): Primary | ICD-10-CM

## 2020-04-14 DIAGNOSIS — Z51.12 ENCOUNTER FOR ANTINEOPLASTIC IMMUNOTHERAPY: ICD-10-CM

## 2020-04-14 DIAGNOSIS — C61 PROSTATE CANCER (HCC): ICD-10-CM

## 2020-04-14 PROCEDURE — 84443 ASSAY THYROID STIM HORMONE: CPT

## 2020-04-14 PROCEDURE — 96413 CHEMO IV INFUSION 1 HR: CPT

## 2020-04-14 PROCEDURE — 80053 COMPREHEN METABOLIC PANEL: CPT

## 2020-04-14 PROCEDURE — 99215 OFFICE O/P EST HI 40 MIN: CPT | Performed by: INTERNAL MEDICINE

## 2020-04-14 PROCEDURE — 93296 REM INTERROG EVL PM/IDS: CPT | Performed by: INTERNAL MEDICINE

## 2020-04-14 PROCEDURE — 93295 DEV INTERROG REMOTE 1/2/MLT: CPT | Performed by: INTERNAL MEDICINE

## 2020-04-14 PROCEDURE — X1114 CARDIAC DEVICE HOME CHECK - REMOTE UNSCHEDULED: HCPCS | Performed by: INTERNAL MEDICINE

## 2020-04-14 PROCEDURE — 85025 COMPLETE CBC W/AUTO DIFF WBC: CPT

## 2020-04-14 RX ORDER — HEPARIN SODIUM (PORCINE) LOCK FLUSH IV SOLN 100 UNIT/ML 100 UNIT/ML
5 SOLUTION INTRAVENOUS ONCE
Status: CANCELLED | OUTPATIENT
Start: 2020-04-14

## 2020-04-14 RX ORDER — HEPARIN SODIUM (PORCINE) LOCK FLUSH IV SOLN 100 UNIT/ML 100 UNIT/ML
SOLUTION INTRAVENOUS
Status: DISCONTINUED
Start: 2020-04-14 | End: 2020-04-14

## 2020-04-14 RX ORDER — 0.9 % SODIUM CHLORIDE 0.9 %
10 VIAL (ML) INJECTION ONCE
Status: CANCELLED | OUTPATIENT
Start: 2020-04-14

## 2020-04-14 RX ORDER — 0.9 % SODIUM CHLORIDE 0.9 %
VIAL (ML) INJECTION
Status: DISCONTINUED
Start: 2020-04-14 | End: 2020-04-14

## 2020-04-14 RX ORDER — HEPARIN SODIUM (PORCINE) LOCK FLUSH IV SOLN 100 UNIT/ML 100 UNIT/ML
5 SOLUTION INTRAVENOUS ONCE
Status: COMPLETED | OUTPATIENT
Start: 2020-04-14 | End: 2020-04-14

## 2020-04-14 RX ADMIN — HEPARIN SODIUM (PORCINE) LOCK FLUSH IV SOLN 100 UNIT/ML 500 UNITS: 100 SOLUTION INTRAVENOUS at 12:10:00

## 2020-04-14 NOTE — TELEPHONE ENCOUNTER
Joaquin Duque,   Angelique Child wrote this in his notes:      Do you want me to fax this info to [facility] or GRISELDA SOTELO going to upload on the website. 1. Clinically Localized Very High Risk Prostate Cancer (cT1c cN0 cM0)  2.  Right Hydrocele  Patient anish

## 2020-04-14 NOTE — PROGRESS NOTES
Pt here for [facility]. Arrives Ambulating independently    Modifications in dose or schedule: No,    Patient reports he is feeling well, denies any complaints. Had labs/exam prior.      Port previously accessed from lab draw, positive blood return note

## 2020-04-14 NOTE — PROGRESS NOTES
Cancer Center Progress Note    Patient Name: Avila Grimm   YOB: 1950   Medical Record Number: [id number]   CSN: [id number]  Consulting Physician: Alex Victoria MD  Referring Physician(s): Main Main  Date of Visit: 4/14/2020    ALLEN closure with split- thickness skin graft from right thigh by plastic surgeon, Dr. Emily Becerra. Hao Lobo presents at the time of consultation alone.   He reports being in his usual state of health over the last few months prior to diagnosis or curre denies fever, sweats and chills at this time. Patient has no chest pain, dyspnea, mentions mild occasional non productive cough which is unchanged.      Patient some expected hot flashes from endocrine therapy for prostate cancer management with his urologi ENDOSCOPY   • CORONARY STENT PLACEMENT  [address] [place] [address] ([place])   • EXC SKIN MALIG >4CM TRUNK,ARM,LEG Left 03/08/2019    heel melanoma   • EXCISION OF MELANOMA Left 3/8/2019    Performed by Dennise Moreno MD at [facility] OR   • PROSTATE BRACHYTHERAPY N/A 3/17/2 Days per week: Not on file        Minutes per session: Not on file      Stress: Not on file    Relationships      Social connections:        Talks on phone: Not on file        Gets together: Not on file        Attends Zoroastrianism service: Not on file in a home with stairs. Allergies:   No Known Allergies    Current Medications:  gabapentin 300 MG Oral Cap, Take 1 capsule by mouth in the morning and 2 capsules at bedtime. , Disp: 270 capsule, Rfl: 3  Acetaminophen-Codeine #3 300-30 MG Oral Tab, Take pain, dyspnea on exertion. Gastrointestinal: Negative for dysphagia, odynophagia, reflux symptoms, nausea, vomiting, change in bowel habits, diarrhea, and constipation; abdominal pain   Integument/breast: Negative for rash, skin lesions, and pruritus.   He 2.91 04/14/2020 09:11 AM    .0 04/14/2020 09:11 AM       Lab Results   Component Value Date/Time    GLU 89 04/14/2020 09:11 AM    BUN 18 04/14/2020 09:11 AM    CREATSERUM 1.09 04/14/2020 09:11 AM    GFRNAA 68 04/14/2020 09:11 AM    CA 9.1 04/14/2020 recently performed. 7.  Evidence for prior granulomatous disease exposure within the mediastinum/right hilum, and spleen. Coronary atherosclerosis. Colonic diverticulosis        Impression:    No diagnosis found.   68year old M with PMH relevant for C of disease.    --he underwent repeat PET/CT scan prior to cycle 14 on 2/6/2020 decreased FDG activity at the left heel, representing likely post procedural changes.   Stable 1.2 x 1 point 4 subcutaneous soft tissue focus in the right posterior wall consiste [facility]

## 2020-04-15 ENCOUNTER — TELEPHONE (OUTPATIENT)
Dept: SURGERY | Facility: CLINIC | Age: 70
End: 2020-04-15

## 2020-04-15 NOTE — TELEPHONE ENCOUNTER
Samuel Morrow from [facility] calling regarding medication Pembrolizumab, patient is already taking for his melanoma from another doctor. Patient now taking this rx for his prostate. Please call to clarify REJI:[phone], extension [phone],thanks.

## 2020-04-16 ENCOUNTER — TELEPHONE (OUTPATIENT)
Dept: SURGERY | Facility: CLINIC | Age: 70
End: 2020-04-16

## 2020-04-16 NOTE — TELEPHONE ENCOUNTER
Pt has courtney with Dr. Jayshree Gomez on Wed 4/22/2020 prior auth needed for [employer] pt has Adreal.

## 2020-04-16 NOTE — TELEPHONE ENCOUNTER
LMTCB regarding pt courtney for 2020. Pt is scheduled for 2020 and I need to move pt courtney to [facility] 2020. Dr. Андрей Alejandra is only seeing on  as of right now due to the COVID-19 crisis.      Pt called back I verified name/ informed pt that we

## 2020-04-16 NOTE — TELEPHONE ENCOUNTER
There are two other telephone encounters regarding this patient's [facility] authorization - see also 4/15/2020 and 2/14/2020 encounters. Nevaeh VALENTIN with the [facility] contacted me with some confusion about this patient's authorizations.  Dr. Miller Ket

## 2020-04-16 NOTE — TELEPHONE ENCOUNTER
S/ELDON Brown at [facility] and she was confused about the auth requests for this med and I am talking about an auth needed for Eligard when this med is used to treat Melanoma.  I explained that it may be but pt also has prostate cancer and I was just planning to requ

## 2020-04-16 NOTE — TELEPHONE ENCOUNTER
TYLER for Sandy Olvera at [facility], and told her that Dr. Irma Vaughn in medical oncology prescribed that drug and not Dr. Shawn Mane. I told her that if she had more questions to call me at [phone].  I did tell her that I am going to request PA for Eligard 45 mg which is

## 2020-04-22 ENCOUNTER — OFFICE VISIT (OUTPATIENT)
Dept: SURGERY | Facility: CLINIC | Age: 70
End: 2020-04-22
Payer: COMMERCIAL

## 2020-04-22 VITALS
TEMPERATURE: 98 F | SYSTOLIC BLOOD PRESSURE: 106 MMHG | DIASTOLIC BLOOD PRESSURE: 62 MMHG | WEIGHT: 247 LBS | HEIGHT: 74 IN | BODY MASS INDEX: 31.7 KG/M2 | HEART RATE: 56 BPM

## 2020-04-22 DIAGNOSIS — C61 PROSTATE CANCER (HCC): Primary | ICD-10-CM

## 2020-04-22 DIAGNOSIS — R82.90 URINE FINDING: ICD-10-CM

## 2020-04-22 DIAGNOSIS — N43.3 RIGHT HYDROCELE: ICD-10-CM

## 2020-04-22 PROCEDURE — 96402 CHEMO HORMON ANTINEOPL SQ/IM: CPT | Performed by: UROLOGY

## 2020-04-22 PROCEDURE — G0463 HOSPITAL OUTPT CLINIC VISIT: HCPCS | Performed by: UROLOGY

## 2020-04-22 PROCEDURE — 99213 OFFICE O/P EST LOW 20 MIN: CPT | Performed by: UROLOGY

## 2020-04-22 PROCEDURE — 81003 URINALYSIS AUTO W/O SCOPE: CPT | Performed by: UROLOGY

## 2020-04-22 RX ORDER — PHENAZOPYRIDINE HYDROCHLORIDE 100 MG/1
100 TABLET, FILM COATED ORAL 3 TIMES DAILY PRN
Qty: 12 TABLET | Refills: 0 | Status: SHIPPED | OUTPATIENT
Start: 2020-04-22 | End: 2020-05-05 | Stop reason: ALTCHOICE

## 2020-04-22 NOTE — PROGRESS NOTES
[facility]  Follow-Up Visit    HPI: Monster Ruby is a 68year old male presents for a follow up visit. Patient was last seen on 2/14/20. He is here by himself.     INTERVAL HISTORY: Underwent LDR brachytherapy seed implant with Dr. Eileen duarte Lupron 45 mg SubQ 6 months depot. Plan for EBRT + brachy boost with Dr. Cassandra Quintero.    - 12/2019 F/U: Episode of right epididymoorchitis with a Streptococcus agalactiae UTI. Initially treated with Bactrim DS x10 days by ER.   Switched to cefadroxil 1 g twice da small-moderate sized hydrocele is appreciated around the right testicle. Spermatic cord WNL. A nontender, reducible right inguinal hernia is noted with a positive cough impulse. Neurological: He is alert and oriented to person, place, and time.    Skin localized very high risk prostate adenocarcinoma (cT1c cN0 cM0). Patient elected definitive local treatment with RT (EBRT + LDR brachytherapy) + ADT. Received first 6-month depot ADT injection in 10/2019. Completed EBRT at [facility] 1/30/2020.

## 2020-04-22 NOTE — PROGRESS NOTES
I was asked by Abner Quezada to administer this pt an Eligard 45 mg 6 month injection. The med was already out of the fridge and warmed up by the [facility] and I was informed that No PA was needed either. I signed the med out in the injection log book in the med cabinet.  I

## 2020-05-05 ENCOUNTER — NURSE ONLY (OUTPATIENT)
Dept: HEMATOLOGY/ONCOLOGY | Facility: HOSPITAL | Age: 70
End: 2020-05-05
Attending: PHYSICIAN ASSISTANT
Payer: MEDICARE

## 2020-05-05 ENCOUNTER — OFFICE VISIT (OUTPATIENT)
Dept: HEMATOLOGY/ONCOLOGY | Facility: HOSPITAL | Age: 70
End: 2020-05-05
Attending: INTERNAL MEDICINE
Payer: MEDICARE

## 2020-05-05 VITALS
HEIGHT: 74 IN | WEIGHT: 245 LBS | RESPIRATION RATE: 18 BRPM | HEART RATE: 55 BPM | SYSTOLIC BLOOD PRESSURE: 116 MMHG | TEMPERATURE: 97 F | BODY MASS INDEX: 31.44 KG/M2 | DIASTOLIC BLOOD PRESSURE: 68 MMHG | OXYGEN SATURATION: 99 %

## 2020-05-05 VITALS
DIASTOLIC BLOOD PRESSURE: 68 MMHG | TEMPERATURE: 97 F | BODY MASS INDEX: 31 KG/M2 | OXYGEN SATURATION: 99 % | RESPIRATION RATE: 18 BRPM | WEIGHT: 245 LBS | SYSTOLIC BLOOD PRESSURE: 116 MMHG | HEART RATE: 55 BPM

## 2020-05-05 DIAGNOSIS — C43.72 MALIGNANT MELANOMA OF LEFT FOOT (HCC): Primary | ICD-10-CM

## 2020-05-05 DIAGNOSIS — C61 PROSTATE CANCER (HCC): ICD-10-CM

## 2020-05-05 DIAGNOSIS — Z51.12 ENCOUNTER FOR ANTINEOPLASTIC IMMUNOTHERAPY: ICD-10-CM

## 2020-05-05 PROCEDURE — 84443 ASSAY THYROID STIM HORMONE: CPT

## 2020-05-05 PROCEDURE — 99215 OFFICE O/P EST HI 40 MIN: CPT | Performed by: INTERNAL MEDICINE

## 2020-05-05 PROCEDURE — 96413 CHEMO IV INFUSION 1 HR: CPT

## 2020-05-05 PROCEDURE — 85025 COMPLETE CBC W/AUTO DIFF WBC: CPT

## 2020-05-05 PROCEDURE — 80053 COMPREHEN METABOLIC PANEL: CPT

## 2020-05-05 RX ORDER — HEPARIN SODIUM (PORCINE) LOCK FLUSH IV SOLN 100 UNIT/ML 100 UNIT/ML
5 SOLUTION INTRAVENOUS ONCE
Status: COMPLETED | OUTPATIENT
Start: 2020-05-05 | End: 2020-05-05

## 2020-05-05 RX ORDER — 0.9 % SODIUM CHLORIDE 0.9 %
10 VIAL (ML) INJECTION ONCE
Status: CANCELLED | OUTPATIENT
Start: 2020-05-05

## 2020-05-05 RX ORDER — HEPARIN SODIUM (PORCINE) LOCK FLUSH IV SOLN 100 UNIT/ML 100 UNIT/ML
5 SOLUTION INTRAVENOUS ONCE
Status: CANCELLED | OUTPATIENT
Start: 2020-05-05

## 2020-05-05 RX ORDER — ACETAMINOPHEN AND CODEINE PHOSPHATE 300; 30 MG/1; MG/1
1 TABLET ORAL
COMMUNITY
Start: 2019-10-09 | End: 2020-07-31

## 2020-05-05 RX ORDER — HEPARIN SODIUM (PORCINE) LOCK FLUSH IV SOLN 100 UNIT/ML 100 UNIT/ML
SOLUTION INTRAVENOUS
Status: COMPLETED
Start: 2020-05-05 | End: 2020-05-05

## 2020-05-05 RX ORDER — 0.9 % SODIUM CHLORIDE 0.9 %
VIAL (ML) INJECTION
Status: DISCONTINUED
Start: 2020-05-05 | End: 2020-05-05

## 2020-05-05 RX ADMIN — HEPARIN SODIUM (PORCINE) LOCK FLUSH IV SOLN 100 UNIT/ML 500 UNITS: 100 SOLUTION INTRAVENOUS at 12:20:00

## 2020-05-05 NOTE — PROGRESS NOTES
Cancer Center Progress Note    Patient Name: Yennifer Fernando   YOB: 1950   Medical Record Number: [id number]   CSN: [id number]  Consulting Physician: Matthew Plasencia MD  Referring Physician(s): Alejandro Martin  Date of Visit: 5/05/2020    ALLEN closure with split- thickness skin graft from right thigh by plastic surgeon, Dr. Zach Ralph. Johnathan Domínguez presents at the time of consultation alone.   He reports being in his usual state of health over the last few months prior to diagnosis or curre denies fever, sweats and chills at this time. Patient has no chest pain, dyspnea, mentions mild occasional non productive cough which is unchanged.      Patient some expected hot flashes from endocrine therapy for prostate cancer management with his urologi ENDOSCOPY   • CORONARY STENT PLACEMENT  [address] [place] [address] ([place])   • EXC SKIN MALIG >4CM TRUNK,ARM,LEG Left 03/08/2019    heel melanoma   • EXCISION OF MELANOMA Left 3/8/2019    Performed by August Manning MD at [facility] OR   • PROSTATE BRACHYTHERAPY N/A 3/17/2 Days per week: Not on file        Minutes per session: Not on file      Stress: Not on file    Relationships      Social connections:        Talks on phone: Not on file        Gets together: Not on file        Attends [employer] service: Not on file in a home with stairs.       Allergies:   No Known Allergies    Current Medications:  Acetaminophen-Codeine #3 300-30 MG Oral Tab, Take 1 tablet by mouth., Disp: , Rfl:   gabapentin 300 MG Oral Cap, Take 1 capsule by mouth in the morning and 2 capsules at b for dysphagia, odynophagia, reflux symptoms, nausea, vomiting, change in bowel habits, diarrhea, and constipation; abdominal pain   Integument/breast: Negative for rash, skin lesions, and pruritus.   Hematologic/lymphatic: Negative for easy bruising, bleedi 09:50 AM       Lab Results   Component Value Date/Time    GLU 88 05/05/2020 09:50 AM    BUN 13 05/05/2020 09:50 AM    CREATSERUM 1.06 05/05/2020 09:50 AM    GFRNAA 70 05/05/2020 09:50 AM    CA 9.0 05/05/2020 09:50 AM    ALB 3.3 (L) 05/05/2020 09:50 AM    N granulomatous disease exposure within the mediastinum/right hilum, and spleen. Coronary atherosclerosis.   Colonic diverticulosis        Impression:    (C43.72) Malignant melanoma of left foot ([address]  (primary encounter diagnosis)  Plan: PET/CT STANDARD BOD mutation, so will not need combination oral chemotherapy using Dabrafenib/trametinib. --surveillance PET/CT scan on 9/23/19; ordered in light of his new prostate cancer, shows no definitive evidence of active malignancy.  His imaging can be every 3-12 injections)       3.) hx of LUE DVT in 2014    -- unprovoked in 2014 and tx for anticoagulation for 6 months      4.) Left foot neuropathy    --pt was seen by Dr. Bela Devi in [place] [address] with improved symptoms on gabapentin 300 mg qAM+600 mg qHS; and also has a tylen 3/8/19.  Final pathology consistent with malignant melanoma, Breslow's depth of invasion, 2.1, Nish's level 4, all peripheral margins free of melanoma, pT3bN1, as there was 1 out of 8 (1/8) local regional lymph nodes identified as there was left popliteal with pemborlizumab. He underwent the bracytherapy procedure on 3/17/20. He is having some regular urinary frequency causing fatigue from nocturia.  Baileymichelle Bijal is no longer having hematuria, but mentions bowel movements are formed but occurring with each CARDIAC CATHERTERIZATION (PBP)  11/29/2013   • CARDIAC DEFIBRILLATOR PLACEMENT  11/2013   • CATHETER INSERTION PORT-A-CATH N/A 1/24/2020    Performed by Fior Vargas MD at [address] [place] [address]   • COLONOSCOPY N/A 4/26/2018    Performed by Yessica Hinojosa MD at Financial resource strain: Not on file      Food insecurity:        Worry: Not on file        Inability: Not on file      Transportation needs:        Medical: Not on file        Non-medical: Not on file    Tobacco Use      Smoking status: Never Smoker Back Care: Not Asked        Exercise: No        Bike Helmet: Not Asked        Seat Belt: Not Asked        Self-Exams: Not Asked    Social History Narrative      Ulises Saldana is  for 44 yrs to spouse, Nacho Part. Father of 2 adult daughter.  He works a nightly.  , Disp: , Rfl:     leuprolide (LUPRON) depot injection 45 mg, 45 mg, Subcutaneous, Q6 Months, Ori Zimmerman MD, 45 mg at 04/22/20 8118        Review of Systems:    Constitutional: Negative for anorexia, chills, fevers, night sweats and weight carry on all pre-disease performance without restriction      Labs:    Lab Results   Component Value Date/Time    WBC 3.5 (L) 05/05/2020 09:50 AM    RBC 3.38 (L) 05/05/2020 09:50 AM    HGB 9.7 (L) 05/05/2020 09:50 AM    HCT 29.5 (L) 05/05/2020 09:50 AM development of a large hydrocele in the right hemiscrotum with fluid extending into the right inguinal canal.  Correlate with prior workup as this is a new finding since 11/16/19 scrotal ultrasound. 5.  Postprocedural changes in the left groin.   6.  Segme underwent same day intraoperative left foot wound rotation flap closure with split- thickness skin graft from right thigh by plastic surgeon, Dr. Ml Mata. --Patient still has evidence of open wound at left heel.   Discussed with Dr. Sarahi Singh condition on 7/3/19  --repeat PSA has decreased now to 14.40 ng/mL  --pt was noted to have undergone TRUS-Guided prostate biopsy 8/27/2015 by Dr. Edmund Morgan. Negative results with no evidence of malignancy in 21 cores.     --TRUS guided prostate bio

## 2020-05-05 NOTE — PROGRESS NOTES
Trey to infusion today for C18 D1 Keytruda. Arrives Ambulating independently. Feeling well, without complaints today. Today is his last dose.     Lab Results   Component Value Date    WBC 3.5 (L) 05/05/2020    HGB 9.7 (L) 05/05/2020    HCT 29.5 (L) 05/0

## 2020-05-08 RX ORDER — FUROSEMIDE 40 MG/1
TABLET ORAL
Qty: 90 TABLET | Refills: 1 | Status: SHIPPED | OUTPATIENT
Start: 2020-05-08 | End: 2020-11-24

## 2020-05-18 ENCOUNTER — NURSE TRIAGE (OUTPATIENT)
Dept: FAMILY MEDICINE CLINIC | Facility: CLINIC | Age: 70
End: 2020-05-18

## 2020-05-18 DIAGNOSIS — R30.0 DYSURIA: ICD-10-CM

## 2020-05-18 DIAGNOSIS — R35.0 URINARY FREQUENCY: Primary | ICD-10-CM

## 2020-05-18 NOTE — TELEPHONE ENCOUNTER
----- Message from [address] [place] [address].  Ciera sent at 5/18/2020  4:14 PM CDT -----  Regarding: Other  Contact: [phone]  Dr. Hernandez [place] for the past week and a half I have experiencing swelling in my ankle on my right leg in some cases I could feel the swelling w

## 2020-05-19 NOTE — TELEPHONE ENCOUNTER
Action Requested: Summary for Provider     []  Critical Lab, Recommendations Needed  [x] Need Additional Advice  []   FYI    []   Need Orders  [] Need Medications Sent to Pharmacy  []  Other     SUMMARY: Patient complains of left leg ankle swelling that st

## 2020-05-19 NOTE — TELEPHONE ENCOUNTER
Advised patient of Dr. Aidan Sandhu note and number given to scheduling for labs. Patient verbalized understanding.

## 2020-05-19 NOTE — TELEPHONE ENCOUNTER
Make sure the popcorn is not salty. Leg should be while the patient is sitting and not hanging dependently. Support stockings highly recommended if he has a pair (knee-high is fine).   The patient may want to check in with the urologist/oncologist regardi

## 2020-05-26 ENCOUNTER — APPOINTMENT (OUTPATIENT)
Dept: HEMATOLOGY/ONCOLOGY | Facility: HOSPITAL | Age: 70
End: 2020-05-26
Attending: PHYSICIAN ASSISTANT
Payer: MEDICARE

## 2020-05-26 ENCOUNTER — APPOINTMENT (OUTPATIENT)
Dept: HEMATOLOGY/ONCOLOGY | Facility: HOSPITAL | Age: 70
End: 2020-05-26
Attending: INTERNAL MEDICINE
Payer: MEDICARE

## 2020-05-26 ENCOUNTER — HOSPITAL ENCOUNTER (OUTPATIENT)
Dept: NUCLEAR MEDICINE | Facility: HOSPITAL | Age: 70
Discharge: HOME | End: 2020-05-26
Attending: INTERNAL MEDICINE
Payer: MEDICARE

## 2020-05-26 ENCOUNTER — LAB ENCOUNTER (OUTPATIENT)
Dept: LAB | Facility: HOSPITAL | Age: 70
End: 2020-05-26
Attending: INTERNAL MEDICINE
Payer: MEDICARE

## 2020-05-26 DIAGNOSIS — R30.0 DYSURIA: ICD-10-CM

## 2020-05-26 DIAGNOSIS — I10 ESSENTIAL HYPERTENSION: ICD-10-CM

## 2020-05-26 DIAGNOSIS — C61 MALIGNANT NEOPLASM OF PROSTATE (HCC): ICD-10-CM

## 2020-05-26 DIAGNOSIS — E78.5 HYPERLIPIDEMIA, UNSPECIFIED HYPERLIPIDEMIA TYPE: ICD-10-CM

## 2020-05-26 DIAGNOSIS — C61 PROSTATE CANCER (HCC): ICD-10-CM

## 2020-05-26 DIAGNOSIS — Z51.12 ENCOUNTER FOR ANTINEOPLASTIC IMMUNOTHERAPY: ICD-10-CM

## 2020-05-26 DIAGNOSIS — C43.72 MALIGNANT MELANOMA OF LEFT FOOT (HCC): ICD-10-CM

## 2020-05-26 DIAGNOSIS — R35.0 URINARY FREQUENCY: Primary | ICD-10-CM

## 2020-05-26 DIAGNOSIS — I25.110 ATHEROSCLEROSIS OF CORONARY ARTERY OF NATIVE HEART WITH UNSTABLE ANGINA PECTORIS, UNSPECIFIED VESSEL OR LESION TYPE (HCC): ICD-10-CM

## 2020-05-26 PROCEDURE — 78816 PET IMAGE W/CT FULL BODY: CPT | Performed by: INTERNAL MEDICINE

## 2020-05-26 PROCEDURE — 84403 ASSAY OF TOTAL TESTOSTERONE: CPT

## 2020-05-26 PROCEDURE — 84153 ASSAY OF PSA TOTAL: CPT

## 2020-05-26 PROCEDURE — 80048 BASIC METABOLIC PNL TOTAL CA: CPT

## 2020-05-26 PROCEDURE — 82962 GLUCOSE BLOOD TEST: CPT

## 2020-05-26 PROCEDURE — 36415 COLL VENOUS BLD VENIPUNCTURE: CPT

## 2020-05-26 PROCEDURE — 81001 URINALYSIS AUTO W/SCOPE: CPT

## 2020-05-26 PROCEDURE — 87086 URINE CULTURE/COLONY COUNT: CPT

## 2020-05-29 ENCOUNTER — NURSE ONLY (OUTPATIENT)
Dept: HEMATOLOGY/ONCOLOGY | Facility: HOSPITAL | Age: 70
End: 2020-05-29
Attending: PHYSICIAN ASSISTANT
Payer: MEDICARE

## 2020-05-29 ENCOUNTER — OFFICE VISIT (OUTPATIENT)
Dept: HEMATOLOGY/ONCOLOGY | Facility: HOSPITAL | Age: 70
End: 2020-05-29
Attending: INTERNAL MEDICINE
Payer: MEDICARE

## 2020-05-29 VITALS
OXYGEN SATURATION: 100 % | BODY MASS INDEX: 31.18 KG/M2 | RESPIRATION RATE: 18 BRPM | TEMPERATURE: 97 F | DIASTOLIC BLOOD PRESSURE: 59 MMHG | WEIGHT: 243 LBS | SYSTOLIC BLOOD PRESSURE: 98 MMHG | HEART RATE: 58 BPM | HEIGHT: 74 IN

## 2020-05-29 DIAGNOSIS — C43.72 MALIGNANT MELANOMA OF LEFT FOOT (HCC): Primary | ICD-10-CM

## 2020-05-29 DIAGNOSIS — C61 PROSTATE CANCER (HCC): Primary | ICD-10-CM

## 2020-05-29 DIAGNOSIS — C61 PROSTATE CANCER (HCC): ICD-10-CM

## 2020-05-29 DIAGNOSIS — Z85.820 ENCOUNTER FOR FOLLOW-UP SURVEILLANCE OF MELANOMA: ICD-10-CM

## 2020-05-29 DIAGNOSIS — Z08 ENCOUNTER FOR FOLLOW-UP SURVEILLANCE OF MELANOMA: ICD-10-CM

## 2020-05-29 DIAGNOSIS — G62.9 NEUROPATHY: ICD-10-CM

## 2020-05-29 LAB
ALBUMIN SERPL-MCNC: 3.5 G/DL
ALBUMIN/GLOB SERPL: 1 {RATIO}
ALP SERPL-CCNC: 66 U/L
ALT SERPL-CCNC: 29 UNITS/L
ANION GAP SERPL CALC-SCNC: 5 MMOL/L
AST SERPL-CCNC: 27 UNITS/L
BILIRUB SERPL-MCNC: 0.3 MG/DL
BUN SERPL-MCNC: 13 MG/DL
BUN/CREAT SERPL: 12.5
CALCIUM SERPL-MCNC: 9.1 MG/DL
CHLORIDE SERPL-SCNC: 110 MMOL/L
CO2 SERPL-SCNC: 29 MMOL/L
CREAT SERPL-MCNC: 1.04 MG/DL
GLOBULIN SER-MCNC: 3.6 G/DL
GLUCOSE SERPL-MCNC: 93 MG/DL
HCT VFR BLD CALC: 30.8 %
HGB BLD-MCNC: 10.3 G/DL
PLATELET # BLD: 175 K/MCL
POTASSIUM SERPL-SCNC: 3.5 MMOL/L
PROT SERPL-MCNC: 7.1 G/DL
RBC # BLD: 3.58 10*6/UL
SODIUM SERPL-SCNC: 144 MMOL/L
TSH SERPL-ACNC: 1.06 MCUNITS/ML
WBC # BLD: 3.1 K/MCL

## 2020-05-29 PROCEDURE — 80053 COMPREHEN METABOLIC PANEL: CPT

## 2020-05-29 PROCEDURE — 85025 COMPLETE CBC W/AUTO DIFF WBC: CPT

## 2020-05-29 PROCEDURE — 99215 OFFICE O/P EST HI 40 MIN: CPT | Performed by: INTERNAL MEDICINE

## 2020-05-29 PROCEDURE — 36591 DRAW BLOOD OFF VENOUS DEVICE: CPT

## 2020-05-29 RX ORDER — 0.9 % SODIUM CHLORIDE 0.9 %
VIAL (ML) INJECTION
Status: DISPENSED
Start: 2020-05-29 | End: 2020-05-30

## 2020-05-29 RX ORDER — HEPARIN SODIUM (PORCINE) LOCK FLUSH IV SOLN 100 UNIT/ML 100 UNIT/ML
5 SOLUTION INTRAVENOUS ONCE
Status: COMPLETED | OUTPATIENT
Start: 2020-05-29 | End: 2020-05-29

## 2020-05-29 RX ORDER — HEPARIN SODIUM (PORCINE) LOCK FLUSH IV SOLN 100 UNIT/ML 100 UNIT/ML
5 SOLUTION INTRAVENOUS ONCE
Status: CANCELLED | OUTPATIENT
Start: 2020-05-29

## 2020-05-29 RX ORDER — 0.9 % SODIUM CHLORIDE 0.9 %
10 VIAL (ML) INJECTION ONCE
Status: CANCELLED | OUTPATIENT
Start: 2020-05-29

## 2020-05-29 RX ADMIN — HEPARIN SODIUM (PORCINE) LOCK FLUSH IV SOLN 100 UNIT/ML 500 UNITS: 100 SOLUTION INTRAVENOUS at 12:45:00

## 2020-05-29 NOTE — PROGRESS NOTES
Cancer Center Progress Note    Patient Name: Cezar Garcia   YOB: 1950   Medical Record Number: [id number]   CSN: [id number]  Consulting Physician: Randy Thompson MD  Referring Physician(s): Kelsey Rosales  Date of Visit: 5/29/2020    ALLEN closure with split- thickness skin graft from right thigh by plastic surgeon, Dr. Zach Ralph. Johnathan Domínguez presents at the time of consultation alone.   He reports being in his usual state of health over the last few months prior to diagnosis or curre the bracytherapy procedure on 3/17/20. He is continuing to have regular urinary frequency causing fatigue from nocturia. Patty Slaughter is mentions bowel movements are formed but occurring with each episode of urination which is fatiguing.      Patty Slaughter DEFIBRILLATOR PLACEMENT  11/2013   • CATHETER INSERTION PORT-A-CATH N/A 1/24/2020    Performed by Travis Garnica MD at [facility] MAIN OR   • COLONOSCOPY N/A 4/26/2018    Performed by Juliana Cardozo MD at [facility] ENDOSCOPY   • COLONOSCOPY N/A 2/24/2018    Performe insecurity:        Worry: Not on file        Inability: Not on file      Transportation needs:        Medical: Not on file        Non-medical: Not on file    Tobacco Use      Smoking status: Never Smoker      Smokeless tobacco: Never Used    Substance and Bike Helmet: Not Asked        Seat Belt: Not Asked        Self-Exams: Not Asked    Social History Narrative      Anabel Diaz is  for 44 yrs to spouse, Etienne Anguiano. Father of 2 adult daughter.  He works as a  for a [employer] based in [facility] Disp: , Rfl:   aspirin 81 MG Oral Tab, Take 81 mg by mouth nightly.  , Disp: , Rfl:     leuprolide (LUPRON) depot injection 45 mg, 45 mg, Subcutaneous, Q6 Months, Galina Zimmerman MD, 45 mg at 04/22/20 7765        Review of Systems:    Constitutional: Neg skin thickness/yellowing from inc activity  Neurological: Grossly intact.      Performance Status:    ECOG 0: Fully active, able to carry on all pre-disease performance without restriction      Labs:    Lab Results   Component Value Date/Time    WBC 3.1 (L) pathology consistent with malignant melanoma, Breslow's depth of invasion, 2.1, Nish's level 4, all peripheral margins free of melanoma, pT3bN1, as there was 1 out of 8 (1/8) local regional lymph nodes identified as there was left popliteal sentinel lymph metastatic disease regarding melanoma. Patient has a low level activity of his left foot at the heel consistent with postsurgical changes as well as right heel activity consistent with some mild trauma likely 2/2 overuse/overcompensation of that foot. stage III    History of Present Illness:     Cezar Garcia is a 68year old male that was seen today in the [facility] for evaluation of the above condition.  Patient's PMH is relevant for CAD s/p MI, hx of LUE DVT which was unprovoked in 2014 and tx unintentional weight loss, no change in energy level or functional status. Patient has a compression stocking as in his left foot following the surgery mentions some numbness tingling in the affected limb.   Denies any significant bleeding or bruising but d side effects. He continues to work regularly as a  with his [employer]. ROS is otherwise negative.       Past Medical History:  Past Medical History:   Diagnosis Date   • Asthma 1956    as a child   • Calculus of kidney    • Colon polyp 2018   • [facility]   • REMOVAL OF KIDNEY STONE  1974   • SENT LYMPH NODE BIOPSY Left 03/08/2019    inguinal and popliteal   • SENTINEL LYMPH NODE BIOPSY Left 3/8/2019    Performed by Judah Ely MD at [address] [place] [address] OR   • SKIN GRAFT SPLIT THICKNESS Rig Not on file        Attends meetings of clubs or organizations: Not on file        Relationship status: Not on file      Intimate partner violence:        Fear of current or ex partner: Not on file        Emotionally abused: Not on file        Physically ab 1  Acetaminophen-Codeine #3 300-30 MG Oral Tab, Take 1 tablet by mouth., Disp: , Rfl:   gabapentin 300 MG Oral Cap, Take 1 capsule by mouth in the morning and 2 capsules at bedtime. , Disp: 270 capsule, Rfl: 3  Cetirizine HCl (ZYRTEC ALLERGY) 10 MG Oral Cap lesions, and pruritus. Hematologic/lymphatic: Negative for easy bruising, bleeding, and lymphadenopathy. Musculoskeletal: Negative for myalgias, arthralgias, muscle weakness.   Neurological: Negative for headaches, dizziness, speech problems, gait problem 05/29/2020 12:43 PM    CA 9.1 05/29/2020 12:43 PM    ALB 3.5 05/29/2020 12:43 PM     05/29/2020 12:43 PM    K 3.5 05/29/2020 12:43 PM     05/29/2020 12:43 PM    CO2 29.0 05/29/2020 12:43 PM    ALKPHO 66 05/29/2020 12:43 PM    AST 27 05/29/2020 consistent with postsurgical change. 2. Low-level FDG uptake in the right heel slightly decreased since previous study, probably secondary to inflammatory process. 3. No evidence of metastatic disease. Impression:    No diagnosis found.   69 year signs concerning for progression of disease.    --he underwent repeat PET/CT scan prior to cycle 14 on 2/6/2020 decreased FDG activity at the left heel, representing likely post procedural changes.   Stable 1.2 x 1 point 4 subcutaneous soft tissue focus in [address]  [facility]

## 2020-06-15 ENCOUNTER — PATIENT MESSAGE (OUTPATIENT)
Dept: FAMILY MEDICINE CLINIC | Facility: CLINIC | Age: 70
End: 2020-06-15

## 2020-06-15 DIAGNOSIS — I10 ESSENTIAL HYPERTENSION: ICD-10-CM

## 2020-06-15 RX ORDER — POTASSIUM CHLORIDE 750 MG/1
10 TABLET, EXTENDED RELEASE ORAL DAILY
Qty: 90 TABLET | Refills: 1 | Status: SHIPPED | OUTPATIENT
Start: 2020-06-15 | End: 2020-11-14

## 2020-06-15 RX ORDER — LOSARTAN POTASSIUM 100 MG/1
100 TABLET ORAL DAILY
Qty: 90 TABLET | Refills: 1 | Status: SHIPPED | OUTPATIENT
Start: 2020-06-15 | End: 2020-11-24

## 2020-06-15 NOTE — TELEPHONE ENCOUNTER
From: Navin Stallworth  To: Rodriguez Stevenson DO  Sent: 6/15/2020 3:40 PM CDT  Subject: Prescription Question    Pharmacy is waiting on call from Dr. Duane Mackintosh concerning my prescription.

## 2020-07-06 ENCOUNTER — APPOINTMENT (OUTPATIENT)
Dept: CARDIOLOGY | Age: 70
End: 2020-07-06

## 2020-07-07 RX ORDER — METOPROLOL SUCCINATE 100 MG/1
TABLET, EXTENDED RELEASE ORAL
Qty: 135 TABLET | Refills: 1 | Status: SHIPPED | OUTPATIENT
Start: 2020-07-07 | End: 2020-11-24

## 2020-07-15 ENCOUNTER — OFFICE VISIT (OUTPATIENT)
Dept: DERMATOLOGY CLINIC | Facility: CLINIC | Age: 70
End: 2020-07-15
Payer: COMMERCIAL

## 2020-07-15 DIAGNOSIS — C43.72: Primary | ICD-10-CM

## 2020-07-15 DIAGNOSIS — D23.30 BENIGN NEOPLASM OF SKIN OF FACE: ICD-10-CM

## 2020-07-15 DIAGNOSIS — D23.70 BENIGN NEOPLASM OF SKIN OF LOWER LIMB, INCLUDING HIP, UNSPECIFIED LATERALITY: ICD-10-CM

## 2020-07-15 DIAGNOSIS — D22.9 MULTIPLE NEVI: ICD-10-CM

## 2020-07-15 DIAGNOSIS — D23.4 BENIGN NEOPLASM OF SCALP AND SKIN OF NECK: ICD-10-CM

## 2020-07-15 DIAGNOSIS — D23.5 BENIGN NEOPLASM OF SKIN OF TRUNK, EXCEPT SCROTUM: ICD-10-CM

## 2020-07-15 DIAGNOSIS — D23.60 BENIGN NEOPLASM OF SKIN OF UPPER LIMB, INCLUDING SHOULDER, UNSPECIFIED LATERALITY: ICD-10-CM

## 2020-07-15 PROCEDURE — 99213 OFFICE O/P EST LOW 20 MIN: CPT | Performed by: DERMATOLOGY

## 2020-07-15 PROCEDURE — G0463 HOSPITAL OUTPT CLINIC VISIT: HCPCS | Performed by: DERMATOLOGY

## 2020-07-20 NOTE — PROGRESS NOTES
Kala Jefferson is a 65year old male. HPI:     CC:  Patient presents with:  Lesion: LOV 2/20/2019 Patient present to f/u on chemo for Melanoma . Allergies:  Patient has no known allergies.     HISTORY:    Past Medical History:   Diagnosis Date • PROSTATE BRACHYTHERAPY N/A 3/17/2020    Performed by Dianne Vincent MD at [address] [place] [address]   • SENT LYMPH NODE BIOPSY Left 03/08/2019    inguinal and popliteal   • SENTINEL LYMPH NODE BIOPSY Left 3/8/2019 MOUTH EVERY MORNING BEFORE BREAKFAST 90 tablet 1   • nitroGLYCERIN 0.4 MG Sublingual SL Tab Place 1 tablet (0.4 mg total) under the tongue every 5 (five) minutes as needed for Chest pain.  100 tablet 1   • SIMVASTATIN 80 MG Oral Tab TAKE 1 TABLET BY MOUTH E ENDOSCOPY   • COLONOSCOPY N/A 2/24/2018    Performed by Brandon Larios MD at [address] [facility] ENDOSCOPY   • COLONOSCOPY N/A 2/23/2018    Performed by Thanh Teague MD at [address] ([place])   • [address] [place] [address] Dr >4CM service: Not on file        Active member of club or organization: Not on file        Attends meetings of clubs or organizations: Not on file        Relationship status: Not on file      Intimate partner violence:        Fear of current or ex partner: Not Grandmother    • Colon Cancer Daughter 40        this individual's maternal grandfather has colon cancer   • Bleeding Disorders Neg    • Clotting Disorder Neg    • Sickle Cell Neg        There were no vitals filed for this visit.     HPI:    Patient present Visit:  Requested Prescriptions      No prescriptions requested or ordered in this encounter         Melanoma of plantar aspect of foot, left (hcc)  (primary encounter diagnosis)  Multiple nevi  Benign neoplasm of scalp and skin of neck  Benign neoplasm of in grid. Instructions reviewed at length. Benign nevi, seborrheic  keratoses, cherry angiomas:  Reassurance regarding other benign skin lesions. Signs and symptoms of skin cancer, ABCDE's of melanoma discussed with patient.  Sunscreen use, sun protection

## 2020-07-23 ENCOUNTER — TELEPHONE (OUTPATIENT)
Dept: SURGERY | Facility: CLINIC | Age: 70
End: 2020-07-23

## 2020-07-23 ENCOUNTER — HOSPITAL ENCOUNTER (EMERGENCY)
Facility: HOSPITAL | Age: 70
Discharge: HOME | End: 2020-07-23
Payer: MEDICARE

## 2020-07-23 VITALS
DIASTOLIC BLOOD PRESSURE: 83 MMHG | BODY MASS INDEX: 31.7 KG/M2 | SYSTOLIC BLOOD PRESSURE: 120 MMHG | WEIGHT: 247 LBS | RESPIRATION RATE: 16 BRPM | TEMPERATURE: 98 F | HEART RATE: 68 BPM | OXYGEN SATURATION: 97 % | HEIGHT: 74 IN

## 2020-07-23 DIAGNOSIS — N30.01 ACUTE CYSTITIS WITH HEMATURIA: Primary | ICD-10-CM

## 2020-07-23 LAB
BILIRUB UR QL: NEGATIVE
GLUCOSE UR-MCNC: NEGATIVE MG/DL
KETONES UR-MCNC: NEGATIVE MG/DL
PH UR: 6 [PH] (ref 5–8)
PROT UR-MCNC: 100 MG/DL
RBC #/AREA URNS AUTO: 4 /HPF
SP GR UR STRIP: 1.01 (ref 1–1.03)
UROBILINOGEN UR STRIP-ACNC: 4
WBC #/AREA URNS AUTO: 488 /HPF

## 2020-07-23 PROCEDURE — 81001 URINALYSIS AUTO W/SCOPE: CPT

## 2020-07-23 PROCEDURE — 99283 EMERGENCY DEPT VISIT LOW MDM: CPT

## 2020-07-23 PROCEDURE — 87186 SC STD MICRODIL/AGAR DIL: CPT

## 2020-07-23 PROCEDURE — 87077 CULTURE AEROBIC IDENTIFY: CPT

## 2020-07-23 PROCEDURE — 87086 URINE CULTURE/COLONY COUNT: CPT

## 2020-07-23 RX ORDER — SULFAMETHOXAZOLE AND TRIMETHOPRIM 800; 160 MG/1; MG/1
1 TABLET ORAL 2 TIMES DAILY
Qty: 20 TABLET | Refills: 0 | Status: SHIPPED | OUTPATIENT
Start: 2020-07-23 | End: 2020-08-02

## 2020-07-23 NOTE — TELEPHONE ENCOUNTER
----- Message from [address] [place] [address].  Ciera sent at 7/23/2020  1:43 PM CDT -----  Regarding: Other  Contact: [phone]  Dear doctor I have been in pain since Saturday I finally figured out that I have a UTI all started taking the medication from over the count

## 2020-07-23 NOTE — ED NOTES
Pt states suprapubic pain and pain with urination that started on Saturday. Pt has been trying to drink lots of water and cranberry juice.  Pt has hx of UTI states this feels like that but has worsening pain

## 2020-07-23 NOTE — TELEPHONE ENCOUNTER
Noted. Thank you, Jeff Retana lov 4/22/20 -for prostate ca and luts. Phoned pt and spoke with him. He states he is having constant pain in his Pelvic region since Saturday(today is Thursday). He states \" now I know I have a uti. \"  When asked how he knows it i

## 2020-07-23 NOTE — ED PROVIDER NOTES
Patient Seen in: [facility] AND [facility] Emergency Department      History   Patient presents with:  Urinary Symptoms    Stated Complaint: low abd pain    71yo/m with hx of asthma, gerd, HTN, HLD,melanoma and active prostate cancer currently with seed implante STENT PLACEMENT  [address] [place] [address] ([place])   • EXC SKIN MALIG >4CM TRUNK,ARM,LEG Left 03/08/2019    heel melanoma   • EXCISION OF MELANOMA Left 3/8/2019    Performed by Gerhardt Angelica, MD at [facility] OR   • PACEMAKER/DEFIBRILLATOR     • PROSTATE BRACHYTHERAPY N/A regular rhythm. Heart sounds: Normal heart sounds. Pulmonary:      Effort: Pulmonary effort is normal.      Breath sounds: Normal breath sounds. Abdominal:      General: Bowel sounds are normal.      Palpations: Abdomen is soft.    Musculoskeletal: care provider within the next three months to obtain basic health screening including reassessment of your blood pressure.       Medications Prescribed:  Current Discharge Medication List    START taking these medications    Sulfamethoxazole-TMP -160

## 2020-07-24 NOTE — TELEPHONE ENCOUNTER
Covering Dr. Sherryle Poot. patient went to [address] [place] [address] ER 7/23/2020; evaluated; UTI suspected; UA suspicious for UTI. Urine culture sent; started on Bactrim DS twice daily x10 days.    Dr. Kulwant Hylton

## 2020-07-30 ENCOUNTER — TELEMEDICINE (OUTPATIENT)
Dept: FAMILY MEDICINE CLINIC | Facility: CLINIC | Age: 70
End: 2020-07-30

## 2020-07-30 DIAGNOSIS — N30.91 CYSTITIS WITH HEMATURIA: Primary | ICD-10-CM

## 2020-07-30 PROCEDURE — 99213 OFFICE O/P EST LOW 20 MIN: CPT | Performed by: FAMILY MEDICINE

## 2020-07-30 NOTE — PATIENT INSTRUCTIONS
Medication reviewed and renewed where needed and appropriate. Comply with medications. Continue with increased water hydration. Complete antibiotics. Keep urology appointments.

## 2020-07-31 NOTE — PROGRESS NOTES
HPI:    Patient ID: Sonny Williamson is a 65year old male.     This patient is a 47-year-old -American male who is well-established at our clinic who is doing a virtual video visit as a follow-up to his recent ER visit assessment and treatment for a tablet (0.4 mg total) under the tongue every 5 (five) minutes as needed for Chest pain. 100 tablet 1   • SIMVASTATIN 80 MG Oral Tab TAKE 1 TABLET BY MOUTH EVERY EVENING 90 tablet 2   • Ergocalciferol (VITAMIN D2) 400 units Oral Tab Take by mouth.      • Rashel

## 2020-08-04 ENCOUNTER — OFFICE VISIT (OUTPATIENT)
Dept: SURGERY | Facility: CLINIC | Age: 70
End: 2020-08-04
Payer: COMMERCIAL

## 2020-08-04 VITALS
SYSTOLIC BLOOD PRESSURE: 95 MMHG | WEIGHT: 247 LBS | BODY MASS INDEX: 32 KG/M2 | HEART RATE: 55 BPM | DIASTOLIC BLOOD PRESSURE: 65 MMHG

## 2020-08-04 DIAGNOSIS — N40.1 BPH WITH OBSTRUCTION/LOWER URINARY TRACT SYMPTOMS: Primary | ICD-10-CM

## 2020-08-04 DIAGNOSIS — N43.3 RIGHT HYDROCELE: ICD-10-CM

## 2020-08-04 DIAGNOSIS — C61 PROSTATE CANCER (HCC): ICD-10-CM

## 2020-08-04 DIAGNOSIS — N13.8 BPH WITH OBSTRUCTION/LOWER URINARY TRACT SYMPTOMS: Primary | ICD-10-CM

## 2020-08-04 LAB
BILIRUB UR QL: NEGATIVE
CLARITY UR: CLEAR
COLOR UR: YELLOW
GLUCOSE UR-MCNC: NEGATIVE MG/DL
HGB UR QL STRIP.AUTO: NEGATIVE
KETONES UR-MCNC: NEGATIVE MG/DL
LEUKOCYTE ESTERASE UR QL STRIP.AUTO: NEGATIVE
NITRITE UR QL STRIP.AUTO: NEGATIVE
PH UR: 6 [PH] (ref 5–8)
PROT UR-MCNC: NEGATIVE MG/DL
SP GR UR STRIP: 1.01 (ref 1–1.03)
UROBILINOGEN UR STRIP-ACNC: <2

## 2020-08-04 PROCEDURE — 99214 OFFICE O/P EST MOD 30 MIN: CPT | Performed by: UROLOGY

## 2020-08-04 PROCEDURE — 3074F SYST BP LT 130 MM HG: CPT | Performed by: UROLOGY

## 2020-08-04 PROCEDURE — 51702 INSERT TEMP BLADDER CATH: CPT | Performed by: UROLOGY

## 2020-08-04 PROCEDURE — 3078F DIAST BP <80 MM HG: CPT | Performed by: UROLOGY

## 2020-08-04 PROCEDURE — 51798 US URINE CAPACITY MEASURE: CPT | Performed by: UROLOGY

## 2020-08-04 PROCEDURE — G0463 HOSPITAL OUTPT CLINIC VISIT: HCPCS | Performed by: UROLOGY

## 2020-08-04 NOTE — PROGRESS NOTES
[facility], [facility] Urology  Follow-Up Visit    HPI: Sonny Williamson is a 65year old male presents for a follow up visit. Patient was last seen on 4/22/20. He is here by himself. INTERVAL HISTORY: Patient presented to the ER 7/23/2020 with UTI symptoms. GG 4+5=9, 80 % core involvement. No PNI. - Metastatic work-up including NM BS and PET-CT (for his melanoma) revealed no evidence of metasttatic disease.    - 9/30/19 F/U: Discussed path. Decided on definitive radiation + ADT.  Started bicalutamide in pre hernia is present. Hernia confirmed positive in the right inguinal area. Genitourinary: Right testis shows no mass, no swelling and no tenderness. Left testis shows no mass, no swelling and no tenderness. Uncircumcised.     Genitourinary Comments: Right t calcifications        NM Bone Scan (9/16/2019): Whole body planar bone scan demonstrates no evidence of osseous metastatic disease. There are degenerative changes in the shoulders, hands, knees and feet bilaterally.   There are also degenerative changes in radiation oncology as scheduled. 5.  Send urine for analysis and culture reflex. We will schedule patient for office cystoscopy within the next week or so for further evaluation of his symptoms and ruling out a urethral stricture.     At the end of th

## 2020-08-05 ENCOUNTER — PATIENT MESSAGE (OUTPATIENT)
Dept: SURGERY | Facility: CLINIC | Age: 70
End: 2020-08-05

## 2020-08-05 NOTE — TELEPHONE ENCOUNTER
From: Skye Dao  To: Denise Stein MD  Sent: 8/5/2020 9:13 AM CDT  Subject: Non-Urgent Medical Question    How long can I safely where the catheter before it need changing?

## 2020-08-13 ENCOUNTER — ANCILLARY PROCEDURE (OUTPATIENT)
Dept: CARDIOLOGY | Age: 70
End: 2020-08-13
Attending: INTERNAL MEDICINE

## 2020-08-13 ENCOUNTER — TELEPHONE (OUTPATIENT)
Dept: CARDIOLOGY | Age: 70
End: 2020-08-13

## 2020-08-13 ENCOUNTER — PATIENT MESSAGE (OUTPATIENT)
Dept: SURGERY | Facility: CLINIC | Age: 70
End: 2020-08-13

## 2020-08-13 DIAGNOSIS — Z45.02 ENCOUNTER FOR ASSESSMENT OF IMPLANTABLE CARDIOVERTER-DEFIBRILLATOR (ICD): ICD-10-CM

## 2020-08-17 ENCOUNTER — PROCEDURE (OUTPATIENT)
Dept: SURGERY | Facility: CLINIC | Age: 70
End: 2020-08-17
Payer: COMMERCIAL

## 2020-08-17 VITALS — SYSTOLIC BLOOD PRESSURE: 100 MMHG | HEART RATE: 60 BPM | DIASTOLIC BLOOD PRESSURE: 70 MMHG | TEMPERATURE: 98 F

## 2020-08-17 DIAGNOSIS — N13.8 BPH WITH OBSTRUCTION/LOWER URINARY TRACT SYMPTOMS: ICD-10-CM

## 2020-08-17 DIAGNOSIS — R33.9 URINARY RETENTION: Primary | ICD-10-CM

## 2020-08-17 DIAGNOSIS — N40.1 BPH WITH OBSTRUCTION/LOWER URINARY TRACT SYMPTOMS: ICD-10-CM

## 2020-08-17 PROCEDURE — G0463 HOSPITAL OUTPT CLINIC VISIT: HCPCS | Performed by: UROLOGY

## 2020-08-17 PROCEDURE — 93295 DEV INTERROG REMOTE 1/2/MLT: CPT | Performed by: INTERNAL MEDICINE

## 2020-08-17 PROCEDURE — 93296 REM INTERROG EVL PM/IDS: CPT | Performed by: INTERNAL MEDICINE

## 2020-08-17 PROCEDURE — 52000 CYSTOURETHROSCOPY: CPT | Performed by: UROLOGY

## 2020-08-17 PROCEDURE — 99212 OFFICE O/P EST SF 10 MIN: CPT | Performed by: UROLOGY

## 2020-08-17 PROCEDURE — 3078F DIAST BP <80 MM HG: CPT | Performed by: UROLOGY

## 2020-08-17 PROCEDURE — 51798 US URINE CAPACITY MEASURE: CPT | Performed by: UROLOGY

## 2020-08-17 PROCEDURE — 3074F SYST BP LT 130 MM HG: CPT | Performed by: UROLOGY

## 2020-08-17 RX ORDER — CIPROFLOXACIN 500 MG/1
500 TABLET, FILM COATED ORAL ONCE
Status: COMPLETED | OUTPATIENT
Start: 2020-08-17 | End: 2020-08-17

## 2020-08-17 RX ADMIN — CIPROFLOXACIN 500 MG: 500 TABLET, FILM COATED ORAL at 16:58:00

## 2020-08-17 NOTE — PROGRESS NOTES
[facility], [facility] Urology  Follow-Up Visit    HPI: Yemi Valenzuela is a 65year old male presents for a follow up visit. Patient was last seen on 8/4/20. He is here by himself.     INTERVAL HISTORY: Davis placed in the office 8/4/20 for urinary retention (6 right epididymoorchitis with a Streptococcus agalactiae UTI. Initially treated with Bactrim DS x10 days by ER. Switched to cefadroxil 1 g twice daily for 14 days when seen in the office. Developed a reactive right hydrocele.     - 2/2020 F/U: completed 45 intravesical median lobe component. Bladder: Normal.  No tumor, stone, or glomerulation. Trabeculated +2 with some cellules.     U.O's: Normal    Trabeculation: 2+    POST CYSTOSCOPY MEDICATIONS: sample one tablet Cipro 500 mg given to patient    [facility] degenerative changes in the cervical spine. IMPRESSION:  65year old -American male with clinically localized very high risk prostate adenocarcinoma (cT1c cN0 cM0) diagnosed in 09/2019.  Patient elected and completed definitive local treatm visit this week for CIC teaching. Following that we will initiate CIC 3 times daily and additionally as needed using a 14 French coudé red rubber catheter.     At the end of the visit, and in addition to performing office cystoscopy 10 mintues were spent w

## 2020-08-18 ENCOUNTER — TELEPHONE (OUTPATIENT)
Dept: SURGERY | Facility: CLINIC | Age: 70
End: 2020-08-18

## 2020-08-18 NOTE — TELEPHONE ENCOUNTER
Received cic 180 medical order form completed by . faxed orders along with demographics, insurance info and lov notes. Pt is scheduled for cic teaching wed 8/19/2020. copy of orders placed to scan in, as well as in catheter supply request folder. (confi

## 2020-08-19 ENCOUNTER — NURSE ONLY (OUTPATIENT)
Dept: SURGERY | Facility: CLINIC | Age: 70
End: 2020-08-19
Payer: COMMERCIAL

## 2020-08-19 DIAGNOSIS — R33.9 URINARY RETENTION: Primary | ICD-10-CM

## 2020-08-19 PROCEDURE — G0463 HOSPITAL OUTPT CLINIC VISIT: HCPCS | Performed by: UROLOGY

## 2020-08-19 NOTE — TELEPHONE ENCOUNTER
Below is patient's message sent via [employer]:   From: Claudia Mata  To: Keesha Peoples MD  Sent: 8/13/2020  5:49 PM CDT  Subject: Other    Please make sure you update my schedule change to 3:30 PM    RN called patient to clarify his message.  He was ju

## 2020-08-19 NOTE — PROGRESS NOTES
I gathered my supplies and went into the exam room and I introduced myself to the pt and I verified his name and .  I explained the orders I was given orders by [facility] to teach pt how to perform CIC, 3 times daily and possibly more often depending on the amt urine with a C- notating that this is from the cath. I also gave the pt my name and told him to call if he has any problems or questions. Pt verbalized understanding and compliance.

## 2020-08-20 ENCOUNTER — TELEPHONE (OUTPATIENT)
Dept: SURGERY | Facility: CLINIC | Age: 70
End: 2020-08-20

## 2020-08-20 RX ORDER — CATHETER 12 FR
EACH MISCELLANEOUS
Qty: 90 EACH | Refills: 11 | Status: SHIPPED | OUTPATIENT
Start: 2020-08-20 | End: 2021-12-03 | Stop reason: ALTCHOICE

## 2020-08-20 NOTE — TELEPHONE ENCOUNTER
I called [facility] at [phone], option # 3 and spoke with Addison Medina and he informed that they received a script for this pt for 90 caths per month with refills and as long as there are no changes to that amt then the pt is good to go.  Addison Medina informed that pt an

## 2020-08-25 NOTE — TELEPHONE ENCOUNTER
Received standard written order from [facility] for cic supplies placed on Dr Lucio Monzon desar for sig

## 2020-08-26 DIAGNOSIS — E78.2 MIXED HYPERLIPIDEMIA: ICD-10-CM

## 2020-08-26 DIAGNOSIS — N40.0 BENIGN PROSTATIC HYPERPLASIA, UNSPECIFIED WHETHER LOWER URINARY TRACT SYMPTOMS PRESENT: ICD-10-CM

## 2020-08-26 DIAGNOSIS — K21.9 GASTROESOPHAGEAL REFLUX DISEASE, ESOPHAGITIS PRESENCE NOT SPECIFIED: ICD-10-CM

## 2020-08-26 RX ORDER — TAMSULOSIN HYDROCHLORIDE 0.4 MG/1
CAPSULE ORAL
Qty: 90 CAPSULE | Refills: 1 | Status: SHIPPED | OUTPATIENT
Start: 2020-08-26 | End: 2020-11-24

## 2020-08-26 RX ORDER — PANTOPRAZOLE SODIUM 40 MG/1
TABLET, DELAYED RELEASE ORAL
Qty: 90 TABLET | Refills: 1 | Status: SHIPPED | OUTPATIENT
Start: 2020-08-26 | End: 2020-11-24

## 2020-08-27 ENCOUNTER — PATIENT MESSAGE (OUTPATIENT)
Dept: SURGERY | Facility: CLINIC | Age: 70
End: 2020-08-27

## 2020-08-27 RX ORDER — SIMVASTATIN 80 MG
TABLET ORAL
Qty: 90 TABLET | Refills: 2 | Status: SHIPPED | OUTPATIENT
Start: 2020-08-27 | End: 2021-05-20

## 2020-08-31 ENCOUNTER — OFFICE VISIT (OUTPATIENT)
Dept: HEMATOLOGY/ONCOLOGY | Facility: HOSPITAL | Age: 70
End: 2020-08-31
Attending: INTERNAL MEDICINE
Payer: MEDICARE

## 2020-08-31 ENCOUNTER — OFFICE VISIT (OUTPATIENT)
Dept: CARDIOLOGY | Age: 70
End: 2020-08-31

## 2020-08-31 ENCOUNTER — NURSE ONLY (OUTPATIENT)
Dept: HEMATOLOGY/ONCOLOGY | Facility: HOSPITAL | Age: 70
End: 2020-08-31
Attending: PHYSICIAN ASSISTANT
Payer: MEDICARE

## 2020-08-31 ENCOUNTER — TELEPHONE (OUTPATIENT)
Dept: HEMATOLOGY/ONCOLOGY | Facility: HOSPITAL | Age: 70
End: 2020-08-31

## 2020-08-31 ENCOUNTER — LAB ENCOUNTER (OUTPATIENT)
Dept: LAB | Facility: HOSPITAL | Age: 70
End: 2020-08-31
Attending: INTERNAL MEDICINE
Payer: MEDICARE

## 2020-08-31 VITALS
HEART RATE: 55 BPM | SYSTOLIC BLOOD PRESSURE: 111 MMHG | DIASTOLIC BLOOD PRESSURE: 66 MMHG | OXYGEN SATURATION: 100 % | RESPIRATION RATE: 16 BRPM | TEMPERATURE: 98 F

## 2020-08-31 VITALS
RESPIRATION RATE: 16 BRPM | OXYGEN SATURATION: 100 % | HEIGHT: 74 IN | SYSTOLIC BLOOD PRESSURE: 111 MMHG | BODY MASS INDEX: 31.83 KG/M2 | DIASTOLIC BLOOD PRESSURE: 66 MMHG | WEIGHT: 248 LBS | HEART RATE: 55 BPM | TEMPERATURE: 98 F

## 2020-08-31 VITALS
WEIGHT: 247 LBS | HEIGHT: 74 IN | DIASTOLIC BLOOD PRESSURE: 66 MMHG | HEART RATE: 63 BPM | SYSTOLIC BLOOD PRESSURE: 128 MMHG | OXYGEN SATURATION: 98 % | BODY MASS INDEX: 31.7 KG/M2

## 2020-08-31 VITALS
HEIGHT: 74 IN | SYSTOLIC BLOOD PRESSURE: 128 MMHG | WEIGHT: 246 LBS | OXYGEN SATURATION: 98 % | HEART RATE: 63 BPM | DIASTOLIC BLOOD PRESSURE: 66 MMHG | BODY MASS INDEX: 31.57 KG/M2

## 2020-08-31 DIAGNOSIS — Z08 ENCOUNTER FOR FOLLOW-UP SURVEILLANCE OF MELANOMA: ICD-10-CM

## 2020-08-31 DIAGNOSIS — D64.9 NORMOCYTIC ANEMIA: ICD-10-CM

## 2020-08-31 DIAGNOSIS — Z85.820 ENCOUNTER FOR FOLLOW-UP SURVEILLANCE OF MELANOMA: ICD-10-CM

## 2020-08-31 DIAGNOSIS — E78.49 OTHER HYPERLIPIDEMIA: ICD-10-CM

## 2020-08-31 DIAGNOSIS — I25.10 ATHEROSCLEROSIS OF NATIVE CORONARY ARTERY OF NATIVE HEART WITHOUT ANGINA PECTORIS: Primary | ICD-10-CM

## 2020-08-31 DIAGNOSIS — I42.0 PRIMARY DILATED CARDIOMYOPATHY (CMD): ICD-10-CM

## 2020-08-31 DIAGNOSIS — C43.72 MALIGNANT MELANOMA OF LEFT FOOT (HCC): ICD-10-CM

## 2020-08-31 DIAGNOSIS — C61 PROSTATE CANCER (HCC): Primary | ICD-10-CM

## 2020-08-31 DIAGNOSIS — I25.10 ATHEROSCLEROSIS OF NATIVE CORONARY ARTERY OF NATIVE HEART WITHOUT ANGINA PECTORIS: ICD-10-CM

## 2020-08-31 DIAGNOSIS — I34.0 NONRHEUMATIC MITRAL (VALVE) INSUFFICIENCY: ICD-10-CM

## 2020-08-31 DIAGNOSIS — Z95.810 ICD (IMPLANTABLE CARDIOVERTER-DEFIBRILLATOR), SINGLE, IN SITU: ICD-10-CM

## 2020-08-31 DIAGNOSIS — I50.22 CHRONIC SYSTOLIC HEART FAILURE (CMD): Primary | ICD-10-CM

## 2020-08-31 DIAGNOSIS — D64.9 NORMOCYTIC ANEMIA: Primary | ICD-10-CM

## 2020-08-31 LAB
ALBUMIN SERPL-MCNC: 3.2 G/DL (ref 3.4–5)
ALBUMIN/GLOB SERPL: 0.9 {RATIO} (ref 1–2)
ALP LIVER SERPL-CCNC: 62 U/L (ref 45–117)
ALT SERPL-CCNC: 24 U/L (ref 16–61)
ANION GAP SERPL CALC-SCNC: 4 MMOL/L (ref 0–18)
AST SERPL-CCNC: 18 U/L (ref 15–37)
BASOPHILS # BLD AUTO: 0.02 X10(3) UL (ref 0–0.2)
BASOPHILS NFR BLD AUTO: 0.4 %
BILIRUB SERPL-MCNC: 0.3 MG/DL (ref 0.1–2)
BUN BLD-MCNC: 19 MG/DL (ref 7–18)
BUN/CREAT SERPL: 18.6 (ref 10–20)
CALCIUM BLD-MCNC: 9 MG/DL (ref 8.5–10.1)
CHLORIDE SERPL-SCNC: 108 MMOL/L (ref 98–112)
CO2 SERPL-SCNC: 29 MMOL/L (ref 21–32)
CREAT BLD-MCNC: 1.02 MG/DL (ref 0.7–1.3)
DEPRECATED HBV CORE AB SER IA-ACNC: 130.7 NG/ML (ref 30–530)
DEPRECATED RDW RBC AUTO: 52 FL (ref 35.1–46.3)
EOSINOPHIL # BLD AUTO: 0.12 X10(3) UL (ref 0–0.7)
EOSINOPHIL NFR BLD AUTO: 2.3 %
ERYTHROCYTE [DISTWIDTH] IN BLOOD BY AUTOMATED COUNT: 16.5 % (ref 11–15)
FOLATE SERPL-MCNC: 18.7 NG/ML (ref 8.7–?)
GLOBULIN PLAS-MCNC: 3.4 G/DL (ref 2.8–4.4)
GLUCOSE BLD-MCNC: 95 MG/DL (ref 70–99)
HAPTOGLOB SERPL-MCNC: 222 MG/DL (ref 30–200)
HCT VFR BLD AUTO: 29.8 % (ref 39–53)
HGB BLD-MCNC: 9.6 G/DL (ref 13–17.5)
HGB RETIC QN AUTO: 30.1 PG (ref 28.2–36.6)
IMM GRANULOCYTES # BLD AUTO: 0.03 X10(3) UL (ref 0–1)
IMM GRANULOCYTES NFR BLD: 0.6 %
IMM RETICS NFR: 0.32 RATIO (ref 0.1–0.3)
IRON SATURATION: 20 % (ref 20–50)
IRON SERPL-MCNC: 64 UG/DL (ref 65–175)
LDH SERPL L TO P-CCNC: 277 U/L
LYMPHOCYTES # BLD AUTO: 0.54 X10(3) UL (ref 1–4)
LYMPHOCYTES NFR BLD AUTO: 10.3 %
M PROTEIN MFR SERPL ELPH: 6.6 G/DL (ref 6.4–8.2)
MCH RBC QN AUTO: 27.7 PG (ref 26–34)
MCHC RBC AUTO-ENTMCNC: 32.2 G/DL (ref 31–37)
MCV RBC AUTO: 86.1 FL (ref 80–100)
MONOCYTES # BLD AUTO: 0.57 X10(3) UL (ref 0.1–1)
MONOCYTES NFR BLD AUTO: 10.9 %
NEUTROPHILS # BLD AUTO: 3.95 X10 (3) UL (ref 1.5–7.7)
NEUTROPHILS # BLD AUTO: 3.95 X10(3) UL (ref 1.5–7.7)
NEUTROPHILS NFR BLD AUTO: 75.5 %
OSMOLALITY SERPL CALC.SUM OF ELEC: 294 MOSM/KG (ref 275–295)
PLATELET # BLD AUTO: 183 10(3)UL (ref 150–450)
POTASSIUM SERPL-SCNC: 3.7 MMOL/L (ref 3.5–5.1)
RBC # BLD AUTO: 3.46 X10(6)UL (ref 3.8–5.8)
RETICS # AUTO: 88 X10(3) UL (ref 22.5–147.5)
RETICS/RBC NFR AUTO: 2.4 % (ref 0.5–2.5)
SODIUM SERPL-SCNC: 141 MMOL/L (ref 136–145)
TOTAL IRON BINDING CAPACITY: 323 UG/DL (ref 240–450)
TRANSFERRIN SERPL-MCNC: 217 MG/DL (ref 200–360)
VIT B12 SERPL-MCNC: 398 PG/ML (ref 193–986)
WBC # BLD AUTO: 5.2 X10(3) UL (ref 4–11)

## 2020-08-31 PROCEDURE — 82746 ASSAY OF FOLIC ACID SERUM: CPT

## 2020-08-31 PROCEDURE — 3074F SYST BP LT 130 MM HG: CPT | Performed by: INTERNAL MEDICINE

## 2020-08-31 PROCEDURE — 3078F DIAST BP <80 MM HG: CPT | Performed by: INTERNAL MEDICINE

## 2020-08-31 PROCEDURE — 36415 COLL VENOUS BLD VENIPUNCTURE: CPT

## 2020-08-31 PROCEDURE — 83010 ASSAY OF HAPTOGLOBIN QUANT: CPT

## 2020-08-31 PROCEDURE — 82607 VITAMIN B-12: CPT

## 2020-08-31 PROCEDURE — 82728 ASSAY OF FERRITIN: CPT

## 2020-08-31 PROCEDURE — 99214 OFFICE O/P EST MOD 30 MIN: CPT | Performed by: INTERNAL MEDICINE

## 2020-08-31 PROCEDURE — 36591 DRAW BLOOD OFF VENOUS DEVICE: CPT

## 2020-08-31 PROCEDURE — 83615 LACTATE (LD) (LDH) ENZYME: CPT

## 2020-08-31 PROCEDURE — 85045 AUTOMATED RETICULOCYTE COUNT: CPT

## 2020-08-31 PROCEDURE — 85025 COMPLETE CBC W/AUTO DIFF WBC: CPT

## 2020-08-31 PROCEDURE — 84466 ASSAY OF TRANSFERRIN: CPT

## 2020-08-31 PROCEDURE — 83540 ASSAY OF IRON: CPT

## 2020-08-31 PROCEDURE — 80053 COMPREHEN METABOLIC PANEL: CPT

## 2020-08-31 RX ORDER — 0.9 % SODIUM CHLORIDE 0.9 %
10 VIAL (ML) INJECTION ONCE
Status: CANCELLED | OUTPATIENT
Start: 2020-08-31

## 2020-08-31 RX ORDER — HEPARIN SODIUM (PORCINE) LOCK FLUSH IV SOLN 100 UNIT/ML 100 UNIT/ML
5 SOLUTION INTRAVENOUS ONCE
Status: COMPLETED | OUTPATIENT
Start: 2020-08-31 | End: 2020-08-31

## 2020-08-31 RX ORDER — 0.9 % SODIUM CHLORIDE 0.9 %
VIAL (ML) INJECTION
Status: DISCONTINUED
Start: 2020-08-31 | End: 2020-08-31

## 2020-08-31 RX ORDER — HEPARIN SODIUM (PORCINE) LOCK FLUSH IV SOLN 100 UNIT/ML 100 UNIT/ML
5 SOLUTION INTRAVENOUS ONCE
Status: CANCELLED | OUTPATIENT
Start: 2020-08-31

## 2020-08-31 RX ADMIN — HEPARIN SODIUM (PORCINE) LOCK FLUSH IV SOLN 100 UNIT/ML 500 UNITS: 100 SOLUTION INTRAVENOUS at 10:23:00

## 2020-08-31 SDOH — HEALTH STABILITY: MENTAL HEALTH: HOW OFTEN DO YOU HAVE A DRINK CONTAINING ALCOHOL?: NEVER

## 2020-08-31 ASSESSMENT — PATIENT HEALTH QUESTIONNAIRE - PHQ9
SUM OF ALL RESPONSES TO PHQ9 QUESTIONS 1 AND 2: 0
1. LITTLE INTEREST OR PLEASURE IN DOING THINGS: NOT AT ALL
1. LITTLE INTEREST OR PLEASURE IN DOING THINGS: NOT AT ALL
CLINICAL INTERPRETATION OF PHQ2 SCORE: NO FURTHER SCREENING NEEDED
CLINICAL INTERPRETATION OF PHQ2 SCORE: NO FURTHER SCREENING NEEDED
CLINICAL INTERPRETATION OF PHQ9 SCORE: NO FURTHER SCREENING NEEDED
SUM OF ALL RESPONSES TO PHQ9 QUESTIONS 1 AND 2: 0
SUM OF ALL RESPONSES TO PHQ9 QUESTIONS 1 AND 2: 0
2. FEELING DOWN, DEPRESSED OR HOPELESS: NOT AT ALL
2. FEELING DOWN, DEPRESSED OR HOPELESS: NOT AT ALL
SUM OF ALL RESPONSES TO PHQ9 QUESTIONS 1 AND 2: 0
CLINICAL INTERPRETATION OF PHQ9 SCORE: NO FURTHER SCREENING NEEDED

## 2020-08-31 NOTE — PROGRESS NOTES
Cancer Center Progress Note    Patient Name: Adriano Wood   YOB: 1950   Medical Record Number: [id number]   CSN: [id number]  Consulting Physician: Shelby Miller MD  Referring Physician(s): Ainsley Turner  Date of Visit: 8/31/2020    ALLEN closure with split- thickness skin graft from right thigh by plastic surgeon, Dr. Sage Waldrop. Kushal Werner presents at the time of consultation alone.   He reports being in his usual state of health over the last few months prior to diagnosis or curre his left foot at the heel consistent with postsurgical changes as well as right heel activity consistent with some mild trauma likely 2/2 overuse/overcompensation of that foot.     He is also being treated for prostate cancer and underwent the bracytherapy Surgical History:  Past Surgical History:   Procedure Laterality Date   • BIOPSY OF PROSTATE,NEEDLE/PUNCH  09/04/2019    Dr. Dania Kearns   • BIOPSY OF SKIN LESION Left 11/29/2018    Dr. Jeremias gonzalez   • CARDIAC CATHERTERIZATION (PBP)  11/29/2013   • CARDIAC D name: Not on file      Number of children: 2      Years of education: Not on file      Highest education level: Not on file    Occupational History      Occupation:         Employer: [employer]     Social Needs      Financial resource strain: Not Caffeine Concern: No        Occupational Exposure: Not Asked        Hobby Hazards: Not Asked        Sleep Concern: Not Asked        Stress Concern: Not Asked        Weight Concern: Not Asked        Special Diet: Not Asked        Back Care: Not Asked tablet, Rfl: 1  FUROSEMIDE 40 MG Oral Tab, TAKE 1 TABLET(40 MG) BY MOUTH EVERY MORNING, Disp: 90 tablet, Rfl: 1  gabapentin 300 MG Oral Cap, Take 1 capsule by mouth in the morning and 2 capsules at bedtime. , Disp: 270 capsule, Rfl: 3  Cetirizine HCl (Alley Rodgers HPI.     Vital Signs:  /66 (BP Location: Right arm, Patient Position: Sitting, Cuff Size: adult)   Pulse 55   Temp 98 °F (36.7 °C)   Resp 16   Ht 1.88 m (6' 2\")   Wt 112.5 kg (248 lb)   SpO2 100%   BMI 31.84 kg/m²     Physical Examination:    Deanaclotilde Obregonter consistent with postsurgical change. 2. Low-level FDG uptake in the right heel slightly decreased since previous study, probably secondary to inflammatory process. 3. No evidence of metastatic disease.       Impression:    (D64.9) Normocytic anemia  (prim active malignancy. His imaging can be every 3-12 months on adjuvant therapy or if there are signs suggestive of progression of disease.  Clinically doing well, no signs concerning for progression of disease.    --he underwent repeat PET/CT scan prior to cyc to have undergone TRUS-Guided prostate biopsy 8/27/2015 by Dr. Mark Rosales. Negative results with no evidence of malignancy in 21 cores.     --TRUS guided prostate biopsy on 9/4/19  --patient is currently undergoing definitive treatment with EBRT+ADT

## 2020-08-31 NOTE — TELEPHONE ENCOUNTER
Spoke with Hillary Manzo and advised him that Dr Cook has ordered a PET scan for him which is due before he returns for his appt on 12/1/20. Call central scheduling and schedule this scan for the last week in November, not before. Phone number provided.    H

## 2020-09-01 ENCOUNTER — OFFICE VISIT (OUTPATIENT)
Dept: FAMILY MEDICINE CLINIC | Facility: CLINIC | Age: 70
End: 2020-09-01
Payer: COMMERCIAL

## 2020-09-01 VITALS
HEIGHT: 74 IN | RESPIRATION RATE: 16 BRPM | SYSTOLIC BLOOD PRESSURE: 120 MMHG | HEART RATE: 62 BPM | DIASTOLIC BLOOD PRESSURE: 83 MMHG | BODY MASS INDEX: 31.83 KG/M2 | TEMPERATURE: 97 F | WEIGHT: 248 LBS

## 2020-09-01 DIAGNOSIS — J44.9 ASTHMA WITH COPD (CHRONIC OBSTRUCTIVE PULMONARY DISEASE) (HCC): ICD-10-CM

## 2020-09-01 DIAGNOSIS — Z00.00 MEDICARE ANNUAL WELLNESS VISIT, INITIAL: Primary | ICD-10-CM

## 2020-09-01 DIAGNOSIS — C77.4 SECONDARY AND UNSPECIFIED MALIGNANT NEOPLASM OF INGUINAL AND LOWER LIMB LYMPH NODES (HCC): ICD-10-CM

## 2020-09-01 DIAGNOSIS — N13.8 BPH WITH OBSTRUCTION/LOWER URINARY TRACT SYMPTOMS: ICD-10-CM

## 2020-09-01 DIAGNOSIS — I77.1 TORTUOUS AORTA (HCC): ICD-10-CM

## 2020-09-01 DIAGNOSIS — N40.1 BPH WITH OBSTRUCTION/LOWER URINARY TRACT SYMPTOMS: ICD-10-CM

## 2020-09-01 DIAGNOSIS — I10 ESSENTIAL HYPERTENSION: ICD-10-CM

## 2020-09-01 DIAGNOSIS — E78.5 HYPERLIPIDEMIA, UNSPECIFIED HYPERLIPIDEMIA TYPE: ICD-10-CM

## 2020-09-01 PROBLEM — I50.20 SYSTOLIC HEART FAILURE (HCC): Status: ACTIVE | Noted: 2020-01-01

## 2020-09-01 PROBLEM — D69.6 THROMBOCYTOPENIA (HCC): Chronic | Status: ACTIVE | Noted: 2020-09-01

## 2020-09-01 PROBLEM — I50.20 SYSTOLIC HEART FAILURE (HCC): Status: ACTIVE | Noted: 2020-09-01

## 2020-09-01 PROBLEM — D69.6 THROMBOCYTOPENIA (HCC): Chronic | Status: ACTIVE | Noted: 2020-01-01

## 2020-09-01 PROCEDURE — 3074F SYST BP LT 130 MM HG: CPT | Performed by: FAMILY MEDICINE

## 2020-09-01 PROCEDURE — 3079F DIAST BP 80-89 MM HG: CPT | Performed by: FAMILY MEDICINE

## 2020-09-01 PROCEDURE — G0439 PPPS, SUBSEQ VISIT: HCPCS | Performed by: FAMILY MEDICINE

## 2020-09-01 PROCEDURE — 3008F BODY MASS INDEX DOCD: CPT | Performed by: FAMILY MEDICINE

## 2020-09-01 PROCEDURE — 99397 PER PM REEVAL EST PAT 65+ YR: CPT | Performed by: FAMILY MEDICINE

## 2020-09-01 PROCEDURE — 96160 PT-FOCUSED HLTH RISK ASSMT: CPT | Performed by: FAMILY MEDICINE

## 2020-09-01 NOTE — PROGRESS NOTES
HPI:    Patient ID: Zoë Vargas is a 65year old male.     The patient is a 65year old male here for medicare annual visit and for status update on any confirmed chronic medical illnesses and follow up on any previous labs or procedures that were sugg retention related to his history of radiation therapy for his prostate cancer. CPT--09414 90 each 11   • losartan 100 MG Oral Tab Take 1 tablet (100 mg total) by mouth daily. 90 tablet 1   • Cetirizine HCl (ZYRTEC ALLERGY) 10 MG Oral Cap Take by mouth. previous visit.  Update Immunization Activity if applicable    Tetanus Orders placed or performed in visit on 04/13/18   • TETANUS, DIPHTHERIA TOXOIDS AND ACELLULAR PERTUSIS VACCINE (TDAP), >7 YEARS, IM USE    Update Immunization Activity if applicable    Z B, Tetanus, or Pneumococcal?: (P) No     Functional Ability     Bathing or Showering: (P) Able without help    Toileting: (P) Able without help    Dressing: (P) Able without help    Eating: (P) Able without help    Driving: (P) Able without help    Prepari restaurant:  No   I get confused about where sounds come from:  No I misunderstand some words in a sentence and need to ask people to repeat themselves:  No   I especially have trouble understanding the speech of women and children:  No I have trouble unde hypertension  Blood pressure measures to goal when measured by physician manually and also portably by the nurse. 6. Hyperlipidemia, unspecified hyperlipidemia type  Stable. 7. BPH with obstruction/lower urinary tract symptoms  Under care of urology.

## 2020-09-02 ENCOUNTER — PATIENT MESSAGE (OUTPATIENT)
Dept: FAMILY MEDICINE CLINIC | Facility: CLINIC | Age: 70
End: 2020-09-02

## 2020-09-03 ENCOUNTER — NURSE TRIAGE (OUTPATIENT)
Dept: FAMILY MEDICINE CLINIC | Facility: CLINIC | Age: 70
End: 2020-09-03

## 2020-09-03 NOTE — TELEPHONE ENCOUNTER
From: Yennifer Fernando  To: Elisha Schwarz DO  Sent: 9/2/2020 8:52 PM CDT  Subject: Other    Dr. Ana Cristina Aguiar on yesterday you asked me twice if I felt dizzy or lightheaded and twice I answered no.  Today as I experience bending over several times I disco

## 2020-09-03 NOTE — TELEPHONE ENCOUNTER
Left message to call back. Transfer to triage    Dr. Duane Mackintosh, please see the [employer] message below.   The patient was seen yesterday 9/1/2020

## 2020-09-03 NOTE — TELEPHONE ENCOUNTER
Action Requested: Summary for Provider     []  Critical Lab, Recommendations Needed  [] Need Additional Advice  []   FYI    []   Need Orders  [] Need Medications Sent to Pharmacy  []  Other     SUMMARY:   Please advise.     The patient was seen in yesterday

## 2020-09-14 NOTE — TELEPHONE ENCOUNTER
From: Laurette Seip  To: Art Henning MD  Sent: 8/27/2020 7:50 PM CDT  Subject: Prescription Question    The first photo represents the Catheters I received from [facility] they are extremely painful.     The second photo represent the catheter that wor

## 2020-09-23 ENCOUNTER — OFFICE VISIT (OUTPATIENT)
Dept: SURGERY | Facility: CLINIC | Age: 70
End: 2020-09-23
Payer: COMMERCIAL

## 2020-09-23 VITALS
BODY MASS INDEX: 32 KG/M2 | WEIGHT: 248 LBS | DIASTOLIC BLOOD PRESSURE: 61 MMHG | HEART RATE: 60 BPM | SYSTOLIC BLOOD PRESSURE: 99 MMHG

## 2020-09-23 DIAGNOSIS — C61 PROSTATE CANCER (HCC): ICD-10-CM

## 2020-09-23 DIAGNOSIS — N13.8 BPH WITH OBSTRUCTION/LOWER URINARY TRACT SYMPTOMS: Primary | ICD-10-CM

## 2020-09-23 DIAGNOSIS — N40.1 BPH WITH OBSTRUCTION/LOWER URINARY TRACT SYMPTOMS: Primary | ICD-10-CM

## 2020-09-23 PROCEDURE — G0463 HOSPITAL OUTPT CLINIC VISIT: HCPCS | Performed by: UROLOGY

## 2020-09-23 PROCEDURE — 3078F DIAST BP <80 MM HG: CPT | Performed by: UROLOGY

## 2020-09-23 PROCEDURE — 99213 OFFICE O/P EST LOW 20 MIN: CPT | Performed by: UROLOGY

## 2020-09-23 PROCEDURE — 3074F SYST BP LT 130 MM HG: CPT | Performed by: UROLOGY

## 2020-09-23 NOTE — PROGRESS NOTES
[facility], [facility] Urology  Follow-Up Visit    HPI: Sara Horn is a 65year old male presents for a follow up visit. Patient was last seen on 8/17/20. He is here by himself.     INTERVAL HISTORY: Performing CIC 3-4 times daily using a 14 Fr silicone cou agalactiae UTI. Initially treated with Bactrim DS x10 days by ER. Switched to cefadroxil 1 g twice daily for 14 days when seen in the office. Developed a reactive right hydrocele.     - 2/2020 F/U: completed 45 Gy of EBRT at [facility] 1/30/2020.     - 3/17/20: involvement. No PNI. Prostate volume on TRUS 45.3 cc.        LABS:    Component PSA   Latest Ref Rng & Units <=4.00 ng/mL   5/26/2020 0.04   7/15/2019 14.40 (H)   3/14/2019 16.50 (H)   10/2/2018 10.9 (H)   4/13/2018 7.9 (H)     Component TESTOSTERONE   Late minimally symptomatic and patient electing watchful waiting. Prostate cancer surveillance per NCCN guidelines. Will continue CIC for his BPH and incomplete bladder emptying.  Discussed channeling TURP again however patient is comfortable with CIC for

## 2020-09-25 ENCOUNTER — TELEPHONE (OUTPATIENT)
Dept: FAMILY MEDICINE CLINIC | Facility: CLINIC | Age: 70
End: 2020-09-25

## 2020-09-25 DIAGNOSIS — C43.72 MALIGNANT MELANOMA OF LEFT FOOT (HCC): Primary | ICD-10-CM

## 2020-09-25 NOTE — TELEPHONE ENCOUNTER
I received a call from [facility] office asking for a referral. I asked her what he's being seen for and she states she doesn't know. Spoke to patient and he states he's following up with podiatry for Malignant melanoma of left foot.      Ref

## 2020-09-28 ENCOUNTER — TELEPHONE (OUTPATIENT)
Dept: SURGERY | Facility: CLINIC | Age: 70
End: 2020-09-28

## 2020-10-02 ENCOUNTER — TELEPHONE (OUTPATIENT)
Dept: SURGERY | Facility: CLINIC | Age: 70
End: 2020-10-02

## 2020-10-02 NOTE — TELEPHONE ENCOUNTER
Patient has upcoming appointment. May require prior auth for injection.      Future Appointments   Date Time Provider Geraldo Horner   10/21/2020  2:40 PM Karen Mendoza MD [facility]   10/26/2020  1:45 PM Keegan Rothman MD [facility]  P

## 2020-10-14 ENCOUNTER — TELEPHONE (OUTPATIENT)
Dept: CARDIOLOGY | Age: 70
End: 2020-10-14

## 2020-10-14 NOTE — TELEPHONE ENCOUNTER
I am not sure if this is the podiatrist that this patient has been seeing regarding his foot neoplasm. If it is, can someone look into this and see why now it is out of network issue?   If this is not the foot specialist she has been seen, can someone call

## 2020-10-14 NOTE — TELEPHONE ENCOUNTER
Jasmeet Kwan,    The podiatrist listed below is not in the patient's network. Please redirect to an [facility].     Thank you  Thomas Floyd

## 2020-10-14 NOTE — TELEPHONE ENCOUNTER
I believe this message was sent in error to Gastroenterology. Forwarded to Dr Heber Vidales. Please note messages below. Thanks.

## 2020-10-16 NOTE — TELEPHONE ENCOUNTER
Late entry from 10/14 I called Beronica Mccormick on the provider line and s/w Jennifer Munoz who then told me I had to go thru the dedicated medicare dept and she transferred me to [facility] who informed that I had to go thru [facility] at [phone] and I called there and romario

## 2020-10-18 RX ORDER — ACETAMINOPHEN AND CODEINE PHOSPHATE 300; 30 MG/1; MG/1
TABLET ORAL
Qty: 50 TABLET | Refills: 0 | Status: SHIPPED | OUTPATIENT
Start: 2020-10-18 | End: 2020-12-09

## 2020-10-19 ENCOUNTER — PATIENT MESSAGE (OUTPATIENT)
Dept: FAMILY MEDICINE CLINIC | Facility: CLINIC | Age: 70
End: 2020-10-19

## 2020-10-19 ENCOUNTER — TELEPHONE (OUTPATIENT)
Dept: SURGERY | Facility: CLINIC | Age: 70
End: 2020-10-19

## 2020-10-19 DIAGNOSIS — C61 PROSTATE CANCER (HCC): ICD-10-CM

## 2020-10-19 DIAGNOSIS — C43.70 MALIGNANT MELANOMA OF LOWER EXTREMITY, INCLUDING HIP, UNSPECIFIED LATERALITY (HCC): Primary | ICD-10-CM

## 2020-10-19 NOTE — TELEPHONE ENCOUNTER
From: Phu Canela  To: Hortencia Quintero DO  Sent: 10/19/2020 11:36 AM CDT  Subject: Other    Dr. Jonathan Kraft is requesting that I have a PET scan in November 2020 I need a referral In order to set up an appointment.

## 2020-10-20 ENCOUNTER — LAB ENCOUNTER (OUTPATIENT)
Dept: LAB | Facility: REFERENCE LAB | Age: 70
End: 2020-10-20
Attending: UROLOGY
Payer: MEDICARE

## 2020-10-20 DIAGNOSIS — C61 PROSTATE CANCER (HCC): ICD-10-CM

## 2020-10-20 PROCEDURE — 36415 COLL VENOUS BLD VENIPUNCTURE: CPT

## 2020-10-20 PROCEDURE — 84153 ASSAY OF PSA TOTAL: CPT

## 2020-10-21 ENCOUNTER — OFFICE VISIT (OUTPATIENT)
Dept: SURGERY | Facility: CLINIC | Age: 70
End: 2020-10-21
Payer: COMMERCIAL

## 2020-10-21 ENCOUNTER — LAB ENCOUNTER (OUTPATIENT)
Dept: LAB | Facility: HOSPITAL | Age: 70
End: 2020-10-21
Attending: FAMILY MEDICINE
Payer: MEDICARE

## 2020-10-21 VITALS
BODY MASS INDEX: 31.57 KG/M2 | SYSTOLIC BLOOD PRESSURE: 128 MMHG | HEART RATE: 58 BPM | WEIGHT: 246 LBS | DIASTOLIC BLOOD PRESSURE: 71 MMHG | HEIGHT: 74 IN

## 2020-10-21 DIAGNOSIS — Z79.818 ANDROGEN DEPRIVATION THERAPY: ICD-10-CM

## 2020-10-21 DIAGNOSIS — N40.1 BPH WITH OBSTRUCTION/LOWER URINARY TRACT SYMPTOMS: ICD-10-CM

## 2020-10-21 DIAGNOSIS — R33.9 URINARY RETENTION: ICD-10-CM

## 2020-10-21 DIAGNOSIS — N13.8 BPH WITH OBSTRUCTION/LOWER URINARY TRACT SYMPTOMS: ICD-10-CM

## 2020-10-21 DIAGNOSIS — N30.91 HEMATURIA DUE TO CYSTITIS: Primary | ICD-10-CM

## 2020-10-21 DIAGNOSIS — Z78.9 INTERMITTENT SELF-CATHETERIZATION OF BLADDER: ICD-10-CM

## 2020-10-21 DIAGNOSIS — C61 PROSTATE CANCER (HCC): Primary | ICD-10-CM

## 2020-10-21 PROCEDURE — 87077 CULTURE AEROBIC IDENTIFY: CPT

## 2020-10-21 PROCEDURE — 99213 OFFICE O/P EST LOW 20 MIN: CPT | Performed by: UROLOGY

## 2020-10-21 PROCEDURE — G0463 HOSPITAL OUTPT CLINIC VISIT: HCPCS | Performed by: UROLOGY

## 2020-10-21 PROCEDURE — 96402 CHEMO HORMON ANTINEOPL SQ/IM: CPT | Performed by: UROLOGY

## 2020-10-21 PROCEDURE — 81001 URINALYSIS AUTO W/SCOPE: CPT

## 2020-10-21 PROCEDURE — 3074F SYST BP LT 130 MM HG: CPT | Performed by: UROLOGY

## 2020-10-21 PROCEDURE — 3008F BODY MASS INDEX DOCD: CPT | Performed by: UROLOGY

## 2020-10-21 PROCEDURE — 87086 URINE CULTURE/COLONY COUNT: CPT

## 2020-10-21 PROCEDURE — 87186 SC STD MICRODIL/AGAR DIL: CPT

## 2020-10-21 PROCEDURE — 3078F DIAST BP <80 MM HG: CPT | Performed by: UROLOGY

## 2020-10-21 RX ORDER — DOXYCYCLINE 100 MG/1
100 CAPSULE ORAL 2 TIMES DAILY
Qty: 14 CAPSULE | Refills: 0 | Status: SHIPPED | OUTPATIENT
Start: 2020-10-21 | End: 2020-10-28

## 2020-10-21 NOTE — PROGRESS NOTES
[facility] Urology  Follow-Up Visit    HPI: Emil Bell is a 65year old male presents for a follow up visit. Patient was last seen on 9/23/20. He is here by himself. INTERVAL HISTORY: Here for ADT injection for his prostate cancer.  Leoncio with Dr. Garrett Byrd.    - 12/2019 F/U: Episode of right epididymoorchitis with a Streptococcus agalactiae UTI. Initially treated with Bactrim DS x10 days by ER. Switched to cefadroxil 1 g twice daily for 14 days when seen in the office.  Developed a reactive r Genitourinary: Discharge (cloudy discharge at urethral meatus. no tenderness.) found. Neurological: He is alert and oriented to person, place, and time. Skin: Skin is warm and dry. Psychiatric: He has a normal mood and affect.        PATHOLOGY:    T symptomatic and patient electing watchful waiting. Will continue CIC for his BPH and incomplete bladder emptying. Patient previously not interested in a channeling TURP.      Will prescribe doxycycline 100 mg twice daily for 7 days for management of pos

## 2020-10-21 NOTE — TELEPHONE ENCOUNTER
PET scan has been ordered. Make sure when you call to set up your appointment you let them know about the melanoma and the prostate. It makes a difference which 1 they will perform.

## 2020-10-21 NOTE — PROGRESS NOTES
I was asked by [facility] to administer this pt an Eligard 45 mg 6 month injection. The med was already out of the fridge and warmed up by the CMA and I was informed that No PA was needed either.   I went to the exam room with the med and I introduced myself to the

## 2020-10-22 NOTE — TELEPHONE ENCOUNTER
Patient informed of provider's response/recommendations below and voiced understating and agrees with all.

## 2020-10-23 DIAGNOSIS — N39.0 URINARY TRACT INFECTION WITHOUT HEMATURIA, SITE UNSPECIFIED: Primary | ICD-10-CM

## 2020-10-23 RX ORDER — CIPROFLOXACIN 500 MG/1
500 TABLET, FILM COATED ORAL 2 TIMES DAILY
Qty: 14 TABLET | Refills: 0 | Status: SHIPPED | OUTPATIENT
Start: 2020-10-23 | End: 2020-10-30

## 2020-10-28 ENCOUNTER — HOSPITAL ENCOUNTER (OUTPATIENT)
Dept: NUCLEAR MEDICINE | Facility: HOSPITAL | Age: 70
Discharge: HOME | End: 2020-10-28
Attending: FAMILY MEDICINE
Payer: MEDICARE

## 2020-10-28 DIAGNOSIS — C61 PROSTATE CANCER (HCC): ICD-10-CM

## 2020-10-28 DIAGNOSIS — C43.70 MALIGNANT MELANOMA OF LOWER EXTREMITY, INCLUDING HIP, UNSPECIFIED LATERALITY (HCC): ICD-10-CM

## 2020-10-28 PROCEDURE — 82962 GLUCOSE BLOOD TEST: CPT

## 2020-10-28 PROCEDURE — 78816 PET IMAGE W/CT FULL BODY: CPT | Performed by: FAMILY MEDICINE

## 2020-11-02 ENCOUNTER — ANCILLARY PROCEDURE (OUTPATIENT)
Dept: CARDIOLOGY | Age: 70
End: 2020-11-02
Attending: INTERNAL MEDICINE

## 2020-11-02 VITALS
DIASTOLIC BLOOD PRESSURE: 60 MMHG | SYSTOLIC BLOOD PRESSURE: 108 MMHG | HEART RATE: 64 BPM | WEIGHT: 243 LBS | RESPIRATION RATE: 14 BRPM | BODY MASS INDEX: 31.2 KG/M2

## 2020-11-02 DIAGNOSIS — Z45.02 ENCOUNTER FOR ASSESSMENT OF IMPLANTABLE CARDIOVERTER-DEFIBRILLATOR (ICD): Primary | ICD-10-CM

## 2020-11-02 PROCEDURE — 93282 PRGRMG EVAL IMPLANTABLE DFB: CPT | Performed by: INTERNAL MEDICINE

## 2020-11-14 RX ORDER — POTASSIUM CHLORIDE 750 MG/1
TABLET, EXTENDED RELEASE ORAL
Qty: 90 TABLET | Refills: 1 | Status: SHIPPED | OUTPATIENT
Start: 2020-11-14 | End: 2021-05-20

## 2020-11-16 PROCEDURE — X1094 NO CHARGE VISIT: HCPCS | Performed by: INTERNAL MEDICINE

## 2020-11-24 DIAGNOSIS — I10 ESSENTIAL HYPERTENSION: ICD-10-CM

## 2020-11-24 DIAGNOSIS — K21.9 GASTROESOPHAGEAL REFLUX DISEASE: ICD-10-CM

## 2020-11-24 DIAGNOSIS — N40.0 BENIGN PROSTATIC HYPERPLASIA, UNSPECIFIED WHETHER LOWER URINARY TRACT SYMPTOMS PRESENT: ICD-10-CM

## 2020-11-24 RX ORDER — TAMSULOSIN HYDROCHLORIDE 0.4 MG/1
CAPSULE ORAL
Qty: 90 CAPSULE | Refills: 1 | Status: SHIPPED | OUTPATIENT
Start: 2020-11-24 | End: 2021-07-15

## 2020-11-24 RX ORDER — FUROSEMIDE 40 MG/1
TABLET ORAL
Qty: 90 TABLET | Refills: 1 | Status: SHIPPED | OUTPATIENT
Start: 2020-11-24 | End: 2021-05-20

## 2020-11-24 RX ORDER — METOPROLOL SUCCINATE 100 MG/1
TABLET, EXTENDED RELEASE ORAL
Qty: 135 TABLET | Refills: 1 | Status: SHIPPED | OUTPATIENT
Start: 2020-11-24 | End: 2021-05-20

## 2020-11-24 RX ORDER — PANTOPRAZOLE SODIUM 40 MG/1
TABLET, DELAYED RELEASE ORAL
Qty: 90 TABLET | Refills: 1 | Status: SHIPPED | OUTPATIENT
Start: 2020-11-24 | End: 2021-05-20

## 2020-11-24 RX ORDER — LOSARTAN POTASSIUM 100 MG/1
TABLET ORAL
Qty: 90 TABLET | Refills: 1 | Status: SHIPPED | OUTPATIENT
Start: 2020-11-24 | End: 2021-05-20

## 2020-12-01 ENCOUNTER — OFFICE VISIT (OUTPATIENT)
Dept: HEMATOLOGY/ONCOLOGY | Facility: HOSPITAL | Age: 70
End: 2020-12-01
Attending: INTERNAL MEDICINE
Payer: MEDICARE

## 2020-12-01 VITALS
HEIGHT: 74 IN | DIASTOLIC BLOOD PRESSURE: 59 MMHG | SYSTOLIC BLOOD PRESSURE: 115 MMHG | OXYGEN SATURATION: 99 % | TEMPERATURE: 98 F | BODY MASS INDEX: 31.44 KG/M2 | HEART RATE: 56 BPM | RESPIRATION RATE: 16 BRPM | WEIGHT: 245 LBS

## 2020-12-01 DIAGNOSIS — Z08 ENCOUNTER FOR FOLLOW-UP SURVEILLANCE OF MELANOMA: Primary | ICD-10-CM

## 2020-12-01 DIAGNOSIS — Z85.820 ENCOUNTER FOR FOLLOW-UP SURVEILLANCE OF MELANOMA: Primary | ICD-10-CM

## 2020-12-01 PROCEDURE — 99214 OFFICE O/P EST MOD 30 MIN: CPT | Performed by: INTERNAL MEDICINE

## 2020-12-01 NOTE — PROGRESS NOTES
Cancer Center Progress Note    Patient Name: Williams Gibson   YOB: 1950   Medical Record Number: [id number]   CSN: [id number]  Consulting Physician: Andria Ly MD  Referring Physician(s): Chuck Schmitt  Date of Visit: 11/02/2020 closure with split- thickness skin graft from right thigh by plastic surgeon, Dr. Honey Richter Home presents at the time of consultation alone.   He reports being in his usual state of health over the last few months prior to diagnosis or curre 5/5/2020. He is also being treated for prostate cancer and underwent radiation therapy which began on 3/17/20. He is now self catheterizing based on his incomplete bladder emptying feature. He denies fever, sweats and chills at this time.  Patient h Performed by Gulshan Yarbrough MD at [address] [place] [address] ENDOSCOPY   • COLONOSCOPY N/A 2/24/2018    Performed by Gulshan Yarbrough MD at [address] [place] [address] ENDOSCOPY   • COLONOSCOPY N/A 2/23/2018    Performed by Travis Martinez MD at [address] [place] [address] Not on file    Tobacco Use      Smoking status: Never Smoker      Smokeless tobacco: Never Used    Substance and Sexual Activity      Alcohol use: No      Drug use: No      Sexual activity: Not on file    Lifestyle      Physical activity        Days per we to spouse, Grzegorz Robertson. Father of 2 adult daughter. He works as a  for a [employer] based in [facility]. He and his wife live in [place]. The patient does not use an assistive device. .        The patient does not live in a home wit under the tongue every 5 (five) minutes as needed for Chest pain., Disp: 100 tablet, Rfl: 1    •  Ergocalciferol (VITAMIN D2) 400 units Oral Tab, Take by mouth., Disp: , Rfl:     •  Calcium 500-125 MG-UNIT Oral Tab, Take by mouth., Disp: , Rfl:     •  diph lymphadenopathy. Neck is supple. Chest: Clear to auscultation. No wheezes or rales. Heart: Regular rate and rhythm. S1S2 normal.  Abdomen: Soft, NABS x 4, No hepatosplenomegaly. No palpable mass. Extremities: No edema or calf tenderness.  +left heel i malignant melanoma of the left heel of his foot with local regional lymph node involvement; pT3bN1      Plan:    1.) Malignant Melanoma -pT3bN1    --underwent wide excision of his left heel melanoma measuring 5.5 cm in diameter, with left popliteal fossa l proximal distal colon representing peristalsis which is likely related to radiation-induced diarrhea for treatment of his prostate cancer    --patient completed 18 cycles/1 yr of pembrolizumab for adjuvant treatment.  His adjuvant systemic therapy was plann 2014    -- unprovoked in 2014 and tx for anticoagulation for 6 months      4.) Left foot neuropathy    --pt was seen by Dr. Adry Cardoza in [facility] with improved symptoms on gabapentin 300 mg qAM+600 mg qHS; and also has a tylenol #3, recently refilled       [employer]

## 2020-12-09 RX ORDER — ACETAMINOPHEN AND CODEINE PHOSPHATE 300; 30 MG/1; MG/1
1 TABLET ORAL EVERY 4 HOURS PRN
Qty: 50 TABLET | Refills: 0 | Status: SHIPPED | OUTPATIENT
Start: 2020-12-09 | End: 2021-01-27

## 2020-12-09 NOTE — TELEPHONE ENCOUNTER
•  ACETAMINOPHEN-CODEINE #3 300-30 MG Oral Tab, TAKE 1 TABLET BY MOUTH EVERY 4 HOURS AS NEEDED FOR PAIN, Disp: 50 tablet, Rfl: 0

## 2021-01-02 DIAGNOSIS — G62.9 NEUROPATHY: ICD-10-CM

## 2021-01-04 RX ORDER — GABAPENTIN 300 MG/1
CAPSULE ORAL
Qty: 270 CAPSULE | Refills: 3 | Status: SHIPPED | OUTPATIENT
Start: 2021-01-04 | End: 2021-05-11

## 2021-01-13 ENCOUNTER — E-VISIT (OUTPATIENT)
Dept: TELEHEALTH | Age: 71
End: 2021-01-13

## 2021-01-13 ENCOUNTER — TELEPHONE (OUTPATIENT)
Dept: SURGERY | Facility: CLINIC | Age: 71
End: 2021-01-13

## 2021-01-13 ENCOUNTER — LAB ENCOUNTER (OUTPATIENT)
Dept: LAB | Facility: HOSPITAL | Age: 71
End: 2021-01-13
Attending: UROLOGY
Payer: MEDICARE

## 2021-01-13 DIAGNOSIS — R39.198 DIFFICULTY URINATING: Primary | ICD-10-CM

## 2021-01-13 DIAGNOSIS — Z02.9 ADMINISTRATIVE ENCOUNTER: Primary | ICD-10-CM

## 2021-01-13 LAB
BACTERIA UR QL AUTO: NEGATIVE /HPF
BILIRUB UR QL: NEGATIVE
CLARITY UR: CLEAR
GLUCOSE UR-MCNC: NEGATIVE MG/DL
KETONES UR-MCNC: NEGATIVE MG/DL
LEUKOCYTE ESTERASE UR QL STRIP.AUTO: NEGATIVE
PH UR: 6 [PH] (ref 5–8)
PROT UR-MCNC: NEGATIVE MG/DL
RBC #/AREA URNS AUTO: 215 /HPF
SP GR UR STRIP: 1.01 (ref 1–1.03)
UROBILINOGEN UR STRIP-ACNC: 4
WBC #/AREA URNS AUTO: 2 /HPF

## 2021-01-13 PROCEDURE — 81001 URINALYSIS AUTO W/SCOPE: CPT | Performed by: UROLOGY

## 2021-01-13 NOTE — TELEPHONE ENCOUNTER
Pt contacted Urology thru [facility]; Identified with name & . He takes his Lasix in the morning, \"& I pee on my own real good. I usually use the catheter 3-4Xs in a day. But I haven't been able to pee(start a stream)   since 9 pm last night.   I to

## 2021-01-13 NOTE — TELEPHONE ENCOUNTER
Urine for analysis and culture reflex. Will follow results and prescribe antibiotics as needed. Continue CIC for the time being, may increase frequency as needed. Watch symptoms.  If not improved within a day or 2 then need to call and schedule office c

## 2021-01-13 NOTE — TELEPHONE ENCOUNTER
S/w pt & read [facility] message as stated below. He agreed to complete UA/ CS @ [facility] today. Reports he \"started taking AZO this am & I started peeing again. \"  However, he will call in 2 days to give the dept a status of his condition. All questions answered.

## 2021-01-13 NOTE — PROGRESS NOTES
Pt with urinary obstruction problems. Advised to contact his Urologist or PCP. If unavailable, advised to go to an IC. E-visit cancelled with no charge.
---

## 2021-01-25 ENCOUNTER — HOSPITAL ENCOUNTER (EMERGENCY)
Facility: HOSPITAL | Age: 71
Discharge: HOME | End: 2021-01-25
Attending: EMERGENCY MEDICINE
Payer: MEDICARE

## 2021-01-25 VITALS
HEIGHT: 74 IN | TEMPERATURE: 98 F | DIASTOLIC BLOOD PRESSURE: 79 MMHG | HEART RATE: 58 BPM | RESPIRATION RATE: 20 BRPM | OXYGEN SATURATION: 99 % | SYSTOLIC BLOOD PRESSURE: 130 MMHG | BODY MASS INDEX: 31.7 KG/M2 | WEIGHT: 247 LBS

## 2021-01-25 DIAGNOSIS — R31.0 GROSS HEMATURIA: Primary | ICD-10-CM

## 2021-01-25 LAB
ANION GAP SERPL CALC-SCNC: 3 MMOL/L (ref 0–18)
BACTERIA UR QL AUTO: NEGATIVE /HPF
BASOPHILS # BLD AUTO: 0.02 X10(3) UL (ref 0–0.2)
BASOPHILS NFR BLD AUTO: 0.5 %
BILIRUB UR QL: NEGATIVE
BUN BLD-MCNC: 15 MG/DL (ref 7–18)
BUN/CREAT SERPL: 15.6 (ref 10–20)
CALCIUM BLD-MCNC: 8.9 MG/DL (ref 8.5–10.1)
CHLORIDE SERPL-SCNC: 111 MMOL/L (ref 98–112)
CO2 SERPL-SCNC: 29 MMOL/L (ref 21–32)
CREAT BLD-MCNC: 0.96 MG/DL
DEPRECATED RDW RBC AUTO: 45 FL (ref 35.1–46.3)
EOSINOPHIL # BLD AUTO: 0.21 X10(3) UL (ref 0–0.7)
EOSINOPHIL NFR BLD AUTO: 4.8 %
ERYTHROCYTE [DISTWIDTH] IN BLOOD BY AUTOMATED COUNT: 15 % (ref 11–15)
GLUCOSE BLD-MCNC: 115 MG/DL (ref 70–99)
GLUCOSE UR-MCNC: NEGATIVE MG/DL
HCT VFR BLD AUTO: 28.2 %
HGB BLD-MCNC: 9 G/DL
IMM GRANULOCYTES # BLD AUTO: 0.01 X10(3) UL (ref 0–1)
IMM GRANULOCYTES NFR BLD: 0.2 %
KETONES UR-MCNC: NEGATIVE MG/DL
LEUKOCYTE ESTERASE UR QL STRIP.AUTO: NEGATIVE
LYMPHOCYTES # BLD AUTO: 0.68 X10(3) UL (ref 1–4)
LYMPHOCYTES NFR BLD AUTO: 15.4 %
MCH RBC QN AUTO: 26.4 PG (ref 26–34)
MCHC RBC AUTO-ENTMCNC: 31.9 G/DL (ref 31–37)
MCV RBC AUTO: 82.7 FL
MONOCYTES # BLD AUTO: 0.57 X10(3) UL (ref 0.1–1)
MONOCYTES NFR BLD AUTO: 12.9 %
NEUTROPHILS # BLD AUTO: 2.92 X10 (3) UL (ref 1.5–7.7)
NEUTROPHILS # BLD AUTO: 2.92 X10(3) UL (ref 1.5–7.7)
NEUTROPHILS NFR BLD AUTO: 66.2 %
NITRITE UR QL STRIP.AUTO: NEGATIVE
OSMOLALITY SERPL CALC.SUM OF ELEC: 298 MOSM/KG (ref 275–295)
PH UR: 6 [PH] (ref 5–8)
PLATELET # BLD AUTO: 188 10(3)UL (ref 150–450)
POTASSIUM SERPL-SCNC: 3.6 MMOL/L (ref 3.5–5.1)
PROT UR-MCNC: 100 MG/DL
RBC # BLD AUTO: 3.41 X10(6)UL
RBC #/AREA URNS AUTO: 1675 /HPF
SODIUM SERPL-SCNC: 143 MMOL/L (ref 136–145)
SP GR UR STRIP: 1.01 (ref 1–1.03)
UROBILINOGEN UR STRIP-ACNC: <2
WBC # BLD AUTO: 4.4 X10(3) UL (ref 4–11)
WBC #/AREA URNS AUTO: 4 /HPF

## 2021-01-25 PROCEDURE — 36415 COLL VENOUS BLD VENIPUNCTURE: CPT

## 2021-01-25 PROCEDURE — 99283 EMERGENCY DEPT VISIT LOW MDM: CPT

## 2021-01-25 PROCEDURE — 80048 BASIC METABOLIC PNL TOTAL CA: CPT | Performed by: EMERGENCY MEDICINE

## 2021-01-25 PROCEDURE — 81001 URINALYSIS AUTO W/SCOPE: CPT | Performed by: EMERGENCY MEDICINE

## 2021-01-25 PROCEDURE — 85025 COMPLETE CBC W/AUTO DIFF WBC: CPT | Performed by: EMERGENCY MEDICINE

## 2021-01-25 RX ORDER — HEPARIN SODIUM (PORCINE) LOCK FLUSH IV SOLN 100 UNIT/ML 100 UNIT/ML
5 SOLUTION INTRAVENOUS ONCE
Status: COMPLETED | OUTPATIENT
Start: 2021-01-25 | End: 2021-01-25

## 2021-01-25 RX ORDER — LIDOCAINE HYDROCHLORIDE 20 MG/ML
20 JELLY TOPICAL ONCE
Status: COMPLETED | OUTPATIENT
Start: 2021-01-25 | End: 2021-01-25

## 2021-01-25 NOTE — ED INITIAL ASSESSMENT (HPI)
Pt comes to ER with c/o blood in urine. Pt self caths for the last 6 months. Self caths three to four times a day. Pt noticed blood last night. Pt is currently being treated for prostate cancer.

## 2021-01-25 NOTE — ED NOTES
Attempted to insert 3-way catheter without success. Called for coude 3 way catheter. Patient was incontinent and began to urinate after catheterization attempt. Urine caught with sterile cup. Appears brown. No clots noted. Dr. Stephy Ugarte made aware.   No fu

## 2021-01-25 NOTE — ED PROVIDER NOTES
Patient Seen in: [facility] AND [facility] Emergency Department      History   Patient presents with:  Urinary Symptoms    Stated Complaint: blood in urine     HPI/Subjective:   HPI  64-year-old male with history of prostate cancer who self caths daily, here wit CATHETER INSERTION PORT-A-CATH N/A 1/24/2020    Performed by Travis Garnica MD at [address] [place] [address] MAIN OR   • COLONOSCOPY N/A 4/26/2018    Performed by Juliana Cardozo MD at [address] [place] [address] ENDOSCOPY   • COLONOSCOPY N/A 2/24/2018    Performed by Juliana Cardozo MD at [address] [place] [address] ENDO and vital signs reviewed. All other systems reviewed and negative except as noted above.     Physical Exam     ED Triage Vitals   BP 01/25/21 1009 120/74   Pulse 01/25/21 1009 67   Resp 01/25/21 1009 18   Temp 01/25/21 1011 98 °F (36.7 °C)   Temp src 0 Specimen: Urine   Result Value Ref Range    Urine Color Red (A) Yellow    Clarity Urine Cloudy (A) Clear    Spec Gravity 1.011 1.002 - 1.035    Glucose Urine Negative Negative mg/dL    Bilirubin Urine Negative Negative    Ketones Urine Negative Negative mg Imaging Results Available and Reviewed by me while in ED:  No results found. EMERGENCY DEPARTMENT COURSE AND TREATMENT:  Patient's condition was stable during Emergency Department evaluation.      70yoM with hematuria  - I personally reviewed and blood pressure.       Medications Prescribed:  Discharge Medication List as of 1/25/2021 12:37 PM

## 2021-01-25 NOTE — ED NOTES
Port deaccessed. Daija Azul states that he urinated several more times here and urine is beginning to clear up.

## 2021-01-26 DIAGNOSIS — Z23 NEED FOR VACCINATION: ICD-10-CM

## 2021-01-27 ENCOUNTER — TELEMEDICINE (OUTPATIENT)
Dept: FAMILY MEDICINE CLINIC | Facility: CLINIC | Age: 71
End: 2021-01-27

## 2021-01-27 DIAGNOSIS — R31.0 GROSS HEMATURIA: Primary | ICD-10-CM

## 2021-01-27 PROCEDURE — 99212 OFFICE O/P EST SF 10 MIN: CPT | Performed by: FAMILY MEDICINE

## 2021-01-27 RX ORDER — ACETAMINOPHEN AND CODEINE PHOSPHATE 300; 30 MG/1; MG/1
TABLET ORAL
Qty: 50 TABLET | Refills: 0 | Status: SHIPPED | OUTPATIENT
Start: 2021-01-27 | End: 2021-03-29

## 2021-01-27 NOTE — PROGRESS NOTES
Sara Horn is a 65year old male. No chief complaint on file.       HPI:   Telehealth outside of [address] Verbal Consent   I conducted a telehealth visit with Sara Horn today, 01/27/21, which was completed using two-way, real-time i started noticing leslie blood coming with the urine.   He went to the ER states at that time they did he did not have an infection and they told him to follow-up with his P primary care doctor and urologist.  Patient states he has been able to urinate on his minutes as needed for Chest pain. 100 tablet 1   • Ergocalciferol (VITAMIN D2) 400 units Oral Tab Take by mouth. • Calcium 500-125 MG-UNIT Oral Tab Take by mouth.      • diphenhydrAMINE HCl 25 MG Oral Tab PRN     • aspirin 81 MG Oral Tab Take 81 mg by m 1974   • Sent lymph node biopsy Left 03/08/2019    inguinal and popliteal   • Spine surgery procedure unlisted  2000    lumbar discectomy   • Split grft proc head,fac,hand <100cm Left 03/08/2019    heel reconstruction      Social History:  Social History

## 2021-02-04 ENCOUNTER — TELEPHONE (OUTPATIENT)
Dept: SURGERY | Facility: CLINIC | Age: 71
End: 2021-02-04

## 2021-02-04 DIAGNOSIS — R30.0 DYSURIA: Primary | ICD-10-CM

## 2021-02-04 NOTE — TELEPHONE ENCOUNTER
Mychart message converted to Acute TE    ----- Message from [address] [place] [address]. Ciera sent at 2/3/2021  8:45 PM CST -----  Regarding: Other  Contact: [phone]  Dr. Neville Liao, this is Trey Melendrez, birthday July 4, 1950.  On January 25, 2021 I went to the

## 2021-02-04 NOTE — TELEPHONE ENCOUNTER
S/W pt and determined that this morning when he performed self cath he had intense burning in his urethra. He states that yesterday he had to self cath 23 times, expelling anywhere from 2-6 ounces each time.  Also informed that he is not urinating at all na

## 2021-02-06 LAB
APPEARANCE: CLEAR
BILIRUBIN: NEGATIVE
GLUCOSE: NEGATIVE
KETONES: NEGATIVE
LEUKOCYTE ESTERASE: NEGATIVE
NITRITE: POSITIVE
OCCULT BLOOD: NEGATIVE
PH: 6.5 (ref 5–8)
SPECIFIC GRAVITY: 1.02 (ref 1–1.03)

## 2021-02-08 ENCOUNTER — OFFICE VISIT (OUTPATIENT)
Dept: SURGERY | Facility: CLINIC | Age: 71
End: 2021-02-08
Payer: COMMERCIAL

## 2021-02-08 VITALS
SYSTOLIC BLOOD PRESSURE: 101 MMHG | HEART RATE: 66 BPM | HEIGHT: 74 IN | DIASTOLIC BLOOD PRESSURE: 58 MMHG | BODY MASS INDEX: 31.57 KG/M2 | WEIGHT: 246 LBS | RESPIRATION RATE: 16 BRPM

## 2021-02-08 DIAGNOSIS — Z78.9 INTERMITTENT SELF-CATHETERIZATION OF BLADDER: ICD-10-CM

## 2021-02-08 DIAGNOSIS — N40.1 BPH WITH OBSTRUCTION/LOWER URINARY TRACT SYMPTOMS: Primary | ICD-10-CM

## 2021-02-08 DIAGNOSIS — N13.8 BPH WITH OBSTRUCTION/LOWER URINARY TRACT SYMPTOMS: Primary | ICD-10-CM

## 2021-02-08 DIAGNOSIS — C61 PROSTATE CANCER (HCC): ICD-10-CM

## 2021-02-08 PROCEDURE — 3008F BODY MASS INDEX DOCD: CPT | Performed by: UROLOGY

## 2021-02-08 PROCEDURE — 3074F SYST BP LT 130 MM HG: CPT | Performed by: UROLOGY

## 2021-02-08 PROCEDURE — 99213 OFFICE O/P EST LOW 20 MIN: CPT | Performed by: UROLOGY

## 2021-02-08 PROCEDURE — 51702 INSERT TEMP BLADDER CATH: CPT | Performed by: UROLOGY

## 2021-02-08 PROCEDURE — 3078F DIAST BP <80 MM HG: CPT | Performed by: UROLOGY

## 2021-02-08 RX ORDER — SOLIFENACIN SUCCINATE 5 MG/1
5 TABLET, FILM COATED ORAL DAILY
Qty: 30 TABLET | Refills: 1 | Status: SHIPPED | OUTPATIENT
Start: 2021-02-08 | End: 2021-04-02

## 2021-02-08 NOTE — PROGRESS NOTES
[facility], [facility] Urology  Follow-Up Visit    HPI: Narcisa Hardwick is a 65year old male presents for a follow up visit. Patient was last seen on 10/21/20. He is here by himself.     INTERVAL HISTORY:     Performing CIC 3-4 times daily using a 14 Fr silicon definitive radiation + ADT. Started bicalutamide in preparation for ADT initiation. - 10/23/19 F/U: Lupron 45 mg SubQ 6 months depot.  Plan for EBRT + brachy boost with Dr. Ron Duque.    - 12/2019 F/U: Episode of right epididymoorchitis with a Streptococc Position: Sitting, Cuff Size: large)   Pulse 66   Resp 16   Ht 6' 2\" (1.88 m)   Wt 246 lb (111.6 kg)   BMI 31.58 kg/m²      Physical Exam    Constitutional: He is oriented to person, place, and time. He appears well-developed.    HENT:   Head: Normocephali this ginette. Prostate cancer surveillance per NCCN guidelines. Next PSA and [facility] agonist 6-month depot due 4/2021. Plan to continue ADT for 2-3 years total (at least until 10/2021) pending patient tolerance of ADT.      Post-infectious right hydrocele counseling, further management and treatment planning.      Alin Contreras MD  02/08/21

## 2021-02-11 ENCOUNTER — TELEPHONE (OUTPATIENT)
Dept: FAMILY MEDICINE CLINIC | Facility: CLINIC | Age: 71
End: 2021-02-11

## 2021-02-15 ENCOUNTER — NURSE ONLY (OUTPATIENT)
Dept: SURGERY | Facility: CLINIC | Age: 71
End: 2021-02-15
Payer: COMMERCIAL

## 2021-02-15 DIAGNOSIS — Z46.6 ENCOUNTER FOR FOLEY CATHETER REMOVAL: Primary | ICD-10-CM

## 2021-02-15 PROCEDURE — 51700 IRRIGATION OF BLADDER: CPT | Performed by: UROLOGY

## 2021-02-15 NOTE — PROGRESS NOTES
Patient's name and  verified. Patient assisted to exam table. Patient's urine in rodriguez bag is clear yellow. Patient's rodriguez catheter balloon deflated, rodriguez gently removed, tip intact. Patient tolerated well. Patient redressed himself.      All questi

## 2021-02-18 ENCOUNTER — TELEPHONE (OUTPATIENT)
Dept: SURGERY | Facility: CLINIC | Age: 71
End: 2021-02-18

## 2021-02-23 PROCEDURE — 93296 REM INTERROG EVL PM/IDS: CPT | Performed by: INTERNAL MEDICINE

## 2021-02-23 PROCEDURE — 93295 DEV INTERROG REMOTE 1/2/MLT: CPT | Performed by: INTERNAL MEDICINE

## 2021-02-24 ENCOUNTER — NURSE TRIAGE (OUTPATIENT)
Dept: FAMILY MEDICINE CLINIC | Facility: CLINIC | Age: 71
End: 2021-02-24

## 2021-02-24 ENCOUNTER — PATIENT MESSAGE (OUTPATIENT)
Dept: SURGERY | Facility: CLINIC | Age: 71
End: 2021-02-24

## 2021-02-24 DIAGNOSIS — R31.0 GROSS HEMATURIA: Primary | ICD-10-CM

## 2021-02-24 NOTE — TELEPHONE ENCOUNTER
Patient informed urology referral has been generated & that it has 3 visits should he need them. When asked if contacted urology, pt states sent a mychart message; advised pt it is better to call with symptoms.  Pt states urine has cleared up now but will c

## 2021-02-24 NOTE — TELEPHONE ENCOUNTER
From: Thee Steve  To: Gloria Glass MD  Sent: 2/24/2021 12:24 PM CST  Subject: Other    I spoke with my primary care physician nurse because I knew I could not see you without a referral. I am sending you photos of some of my concerns and I hope I

## 2021-02-24 NOTE — TELEPHONE ENCOUNTER
Pt states he preforms self catheterizations. He states 2 days ago he noticed blood on the catheter. He thinks he caused trauma to the urethra. Pt states he also has been taking Azo for the past 2 days.  He states he had the same issue about a month ago and

## 2021-02-24 NOTE — TELEPHONE ENCOUNTER
----- Message from [address] [place] [address]. Ciera sent at 2/24/2021 12:43 AM CST -----  Regarding: Other  Contact: [phone]  I believe there is blood in my urine please advise, I have included a photo as of 12:15 AM this morning.

## 2021-02-25 NOTE — TELEPHONE ENCOUNTER
From: Kala Jefferson  To: Deneen Gosselin, MD  Sent: 2/24/2021 3:22 PM CST  Subject: Other    Please disregard my previous message, my concerns or issues have corrected it self sorry for any inconvenience.   [employer]

## 2021-02-25 NOTE — TELEPHONE ENCOUNTER
I called pt and determined that he has intense burning with urination and is now using Azo but states that before he stared the AZO his urine color had improved greatly and was a light brownish color.  He admits that he thinks he advanced the cath too far a

## 2021-02-26 ENCOUNTER — ANCILLARY PROCEDURE (OUTPATIENT)
Dept: CARDIOLOGY | Age: 71
End: 2021-02-26
Attending: INTERNAL MEDICINE

## 2021-02-26 DIAGNOSIS — Z45.02 ENCOUNTER FOR ASSESSMENT OF IMPLANTABLE CARDIOVERTER-DEFIBRILLATOR (ICD): ICD-10-CM

## 2021-03-01 RX ORDER — 0.9 % SODIUM CHLORIDE 0.9 %
10 VIAL (ML) INJECTION ONCE
Status: CANCELLED | OUTPATIENT
Start: 2021-03-01

## 2021-03-01 RX ORDER — HEPARIN SODIUM (PORCINE) LOCK FLUSH IV SOLN 100 UNIT/ML 100 UNIT/ML
5 SOLUTION INTRAVENOUS ONCE
Status: CANCELLED | OUTPATIENT
Start: 2021-03-01

## 2021-03-02 ENCOUNTER — NURSE ONLY (OUTPATIENT)
Dept: HEMATOLOGY/ONCOLOGY | Facility: HOSPITAL | Age: 71
End: 2021-03-02
Attending: INTERNAL MEDICINE
Payer: MEDICARE

## 2021-03-02 ENCOUNTER — LAB ENCOUNTER (OUTPATIENT)
Dept: LAB | Facility: HOSPITAL | Age: 71
End: 2021-03-02
Attending: INTERNAL MEDICINE
Payer: MEDICARE

## 2021-03-02 VITALS
HEIGHT: 74.02 IN | WEIGHT: 243 LBS | HEART RATE: 55 BPM | BODY MASS INDEX: 31.18 KG/M2 | TEMPERATURE: 98 F | RESPIRATION RATE: 16 BRPM | SYSTOLIC BLOOD PRESSURE: 110 MMHG | DIASTOLIC BLOOD PRESSURE: 63 MMHG | OXYGEN SATURATION: 98 %

## 2021-03-02 DIAGNOSIS — D64.9 NORMOCYTIC ANEMIA: ICD-10-CM

## 2021-03-02 DIAGNOSIS — Z08 ENCOUNTER FOR FOLLOW-UP SURVEILLANCE OF MELANOMA: Primary | ICD-10-CM

## 2021-03-02 DIAGNOSIS — Z85.820 ENCOUNTER FOR FOLLOW-UP SURVEILLANCE OF MELANOMA: Primary | ICD-10-CM

## 2021-03-02 DIAGNOSIS — C61 PROSTATE CANCER (HCC): Primary | ICD-10-CM

## 2021-03-02 DIAGNOSIS — D64.9 NORMOCYTIC ANEMIA: Primary | ICD-10-CM

## 2021-03-02 LAB
ALBUMIN SERPL-MCNC: 3.4 G/DL (ref 3.4–5)
ALBUMIN/GLOB SERPL: 1 {RATIO} (ref 1–2)
ALP LIVER SERPL-CCNC: 71 U/L
ALT SERPL-CCNC: 18 U/L
ANION GAP SERPL CALC-SCNC: 6 MMOL/L (ref 0–18)
AST SERPL-CCNC: 15 U/L (ref 15–37)
BASOPHILS # BLD AUTO: 0.02 X10(3) UL (ref 0–0.2)
BASOPHILS NFR BLD AUTO: 0.5 %
BILIRUB SERPL-MCNC: 0.4 MG/DL (ref 0.1–2)
BUN BLD-MCNC: 13 MG/DL (ref 7–18)
BUN/CREAT SERPL: 12.6 (ref 10–20)
CALCIUM BLD-MCNC: 9.3 MG/DL (ref 8.5–10.1)
CHLORIDE SERPL-SCNC: 108 MMOL/L (ref 98–112)
CO2 SERPL-SCNC: 30 MMOL/L (ref 21–32)
CREAT BLD-MCNC: 1.03 MG/DL
DEPRECATED HBV CORE AB SER IA-ACNC: 93.7 NG/ML
DEPRECATED RDW RBC AUTO: 42.8 FL (ref 35.1–46.3)
EOSINOPHIL # BLD AUTO: 0.24 X10(3) UL (ref 0–0.7)
EOSINOPHIL NFR BLD AUTO: 5.7 %
ERYTHROCYTE [DISTWIDTH] IN BLOOD BY AUTOMATED COUNT: 14.6 % (ref 11–15)
FOLATE SERPL-MCNC: 17.2 NG/ML (ref 8.7–?)
GLOBULIN PLAS-MCNC: 3.4 G/DL (ref 2.8–4.4)
GLUCOSE BLD-MCNC: 123 MG/DL (ref 70–99)
HAPTOGLOB SERPL-MCNC: 264 MG/DL (ref 30–200)
HCT VFR BLD AUTO: 30.3 %
HGB BLD-MCNC: 9.5 G/DL
HGB RETIC QN AUTO: 27.1 PG (ref 28.2–36.6)
IMM GRANULOCYTES # BLD AUTO: 0.01 X10(3) UL (ref 0–1)
IMM GRANULOCYTES NFR BLD: 0.2 %
IMM RETICS NFR: 0.29 RATIO (ref 0.1–0.3)
IRON SATURATION: 16 %
IRON SERPL-MCNC: 44 UG/DL
LDH SERPL L TO P-CCNC: 189 U/L
LYMPHOCYTES # BLD AUTO: 0.64 X10(3) UL (ref 1–4)
LYMPHOCYTES NFR BLD AUTO: 15.2 %
M PROTEIN MFR SERPL ELPH: 6.8 G/DL (ref 6.4–8.2)
MCH RBC QN AUTO: 25.5 PG (ref 26–34)
MCHC RBC AUTO-ENTMCNC: 31.4 G/DL (ref 31–37)
MCV RBC AUTO: 81.2 FL
MONOCYTES # BLD AUTO: 0.46 X10(3) UL (ref 0.1–1)
MONOCYTES NFR BLD AUTO: 10.9 %
NEUTROPHILS # BLD AUTO: 2.85 X10 (3) UL (ref 1.5–7.7)
NEUTROPHILS # BLD AUTO: 2.85 X10(3) UL (ref 1.5–7.7)
NEUTROPHILS NFR BLD AUTO: 67.5 %
OSMOLALITY SERPL CALC.SUM OF ELEC: 299 MOSM/KG (ref 275–295)
PLATELET # BLD AUTO: 219 10(3)UL (ref 150–450)
POTASSIUM SERPL-SCNC: 3.5 MMOL/L (ref 3.5–5.1)
RBC # BLD AUTO: 3.73 X10(6)UL
RETICS # AUTO: 57.9 X10(3) UL (ref 22.5–147.5)
RETICS/RBC NFR AUTO: 1.6 %
SODIUM SERPL-SCNC: 144 MMOL/L (ref 136–145)
TOTAL IRON BINDING CAPACITY: 282 UG/DL (ref 240–450)
TRANSFERRIN SERPL-MCNC: 189 MG/DL (ref 200–360)
VIT B12 SERPL-MCNC: 441 PG/ML (ref 193–986)
WBC # BLD AUTO: 4.2 X10(3) UL (ref 4–11)

## 2021-03-02 PROCEDURE — 83010 ASSAY OF HAPTOGLOBIN QUANT: CPT

## 2021-03-02 PROCEDURE — 82728 ASSAY OF FERRITIN: CPT

## 2021-03-02 PROCEDURE — 85045 AUTOMATED RETICULOCYTE COUNT: CPT

## 2021-03-02 PROCEDURE — 85025 COMPLETE CBC W/AUTO DIFF WBC: CPT

## 2021-03-02 PROCEDURE — 36591 DRAW BLOOD OFF VENOUS DEVICE: CPT

## 2021-03-02 PROCEDURE — 80053 COMPREHEN METABOLIC PANEL: CPT

## 2021-03-02 PROCEDURE — 36415 COLL VENOUS BLD VENIPUNCTURE: CPT

## 2021-03-02 PROCEDURE — 82607 VITAMIN B-12: CPT

## 2021-03-02 PROCEDURE — 99215 OFFICE O/P EST HI 40 MIN: CPT | Performed by: INTERNAL MEDICINE

## 2021-03-02 PROCEDURE — 83615 LACTATE (LD) (LDH) ENZYME: CPT

## 2021-03-02 PROCEDURE — 83540 ASSAY OF IRON: CPT

## 2021-03-02 PROCEDURE — 84466 ASSAY OF TRANSFERRIN: CPT

## 2021-03-02 PROCEDURE — 82746 ASSAY OF FOLIC ACID SERUM: CPT

## 2021-03-02 RX ORDER — HEPARIN SODIUM (PORCINE) LOCK FLUSH IV SOLN 100 UNIT/ML 100 UNIT/ML
5 SOLUTION INTRAVENOUS ONCE
Status: COMPLETED | OUTPATIENT
Start: 2021-03-02 | End: 2021-03-02

## 2021-03-02 RX ORDER — SODIUM CHLORIDE 9 MG/ML
INJECTION INTRAVENOUS
Status: DISCONTINUED
Start: 2021-03-02 | End: 2021-03-02

## 2021-03-02 RX ORDER — HEPARIN SODIUM (PORCINE) LOCK FLUSH IV SOLN 100 UNIT/ML 100 UNIT/ML
5 SOLUTION INTRAVENOUS ONCE
Status: CANCELLED | OUTPATIENT
Start: 2021-03-02

## 2021-03-02 RX ORDER — 0.9 % SODIUM CHLORIDE 0.9 %
10 VIAL (ML) INJECTION ONCE
Status: CANCELLED | OUTPATIENT
Start: 2021-03-02

## 2021-03-02 RX ADMIN — HEPARIN SODIUM (PORCINE) LOCK FLUSH IV SOLN 100 UNIT/ML 500 UNITS: 100 SOLUTION INTRAVENOUS at 08:43:00

## 2021-03-02 NOTE — PROGRESS NOTES
Cancer Center Progress Note    Patient Name: Cori Spears   YOB: 1950   Medical Record Number: [id number]   CSN: [id number]  Consulting Physician: Andressa Wong MD  Referring Physician(s): Karyna Menjivar  Date of Visit: 3/02/2021    ALLEN closure with split- thickness skin graft from right thigh by plastic surgeon, Dr. Elle Giang. Washington Andrews presents at the time of consultation alone.   He reports being in his usual state of health over the last few months prior to diagnosis or curre immunotherapy with pemborlizumab given over 18 cycles as of 5/5/2020. He is also being treated for prostate cancer and underwent radiation therapy which began on 3/17/20. He is now self catheterizing based on his incomplete bladder emptying feature. (PBP)  11/29/2013   • CARDIAC DEFIBRILLATOR PLACEMENT  11/2013   • CATHETER INSERTION PORT-A-CATH N/A 1/24/2020    Performed by Jeffry Castro MD at [facility]   • COLONOSCOPY N/A 4/26/2018    Performed by Ginger Rea MD at [facility] Financial resource strain: Not on file      Food insecurity        Worry: Not on file        Inability: Not on file      Transportation needs        Medical: Not on file        Non-medical: Not on file    Tobacco Use      Smoking status: Never Smoker Care: Not Asked        Exercise: No        Bike Helmet: Not Asked        Seat Belt: Not Asked        Self-Exams: Not Asked    Social History Narrative      Santos Rondon is  for 44 yrs to spouse, Fred Johnson. Father of 2 adult daughter.  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    CPT--75884, Disp: 90 each, Rfl: 11    •  Alpha-D-Galactosidase (BEANO ULTRA 800 OR), Take by mouth., Disp: , Rfl:     •  Cetirizine HCl (ZYRTEC ALLERGY) 10 MG Oral Cap, Take by mouth., Disp: , Rfl:     •  nitroGLYCERIN 0. 1.88 m (6' 2.02\")   Wt 110.2 kg (243 lb)   SpO2 98%   BMI 31.19 kg/m²     Physical Examination:    General: Patient is alert and oriented x 3, not in acute distress.   Psych: Friendly, cooperative with appropriate questions and responses  HEENT: EOMs intac FOLIC ACID SERUM(FOLATE), FERRITIN, IRON AND         TIBC, RETICULOCYTE COUNT, VITAMIN B12,         CANCELED: HAPTOGLOBIN, CANCELED: LDH, CANCELED:        RETICULOCYTE COUNT, CANCELED: HAPTOGLOBIN,         CANCELED: LDH    65year old M with PMH relevant f progression of disease.    --he underwent repeat PET/CT scan prior to cycle 14 on 2/6/2020 decreased FDG activity at the left heel, representing likely post procedural changes.   Stable 1.2 x 1 point 4 subcutaneous soft tissue focus in the right posterior w clinic today, no signs of recurrence of melanoma by H&P; needs a repeat PET/CT scan and survivorship visit.      --I will phone him with results from his PET/CT scan as there is a low clinical concern for recurrence    --Discussed these imaging tests can be

## 2021-03-05 ENCOUNTER — TELEPHONE (OUTPATIENT)
Dept: HEMATOLOGY/ONCOLOGY | Facility: HOSPITAL | Age: 71
End: 2021-03-05

## 2021-03-05 NOTE — TELEPHONE ENCOUNTER
Still awaiting auth for PET, called to patient to advise PET appt for Monday is cancelled. Once we receive auth, will call to help reschedule PET. He confirms understanding and thanked me for the call. Arlyne Duverney notifying PET dept to cancel.

## 2021-03-07 LAB
APPEARANCE: CLEAR
BILIRUBIN: NEGATIVE
COLOR: YELLOW
GLUCOSE: NEGATIVE
KETONES: NEGATIVE
LEUKOCYTE ESTERASE: NEGATIVE
NITRITE: NEGATIVE
OCCULT BLOOD: NEGATIVE
PH: 7 (ref 5–8)
PROTEIN: NEGATIVE
SPECIFIC GRAVITY: 1.01 (ref 1–1.03)

## 2021-03-12 DIAGNOSIS — C77.4 SECONDARY AND UNSPECIFIED MALIGNANT NEOPLASM OF INGUINAL AND LOWER LIMB LYMPH NODES (HCC): Primary | ICD-10-CM

## 2021-03-12 DIAGNOSIS — C43.72 MALIGNANT MELANOMA OF LEFT FOOT (HCC): ICD-10-CM

## 2021-03-15 ENCOUNTER — TELEPHONE (OUTPATIENT)
Dept: HEMATOLOGY/ONCOLOGY | Facility: HOSPITAL | Age: 71
End: 2021-03-15

## 2021-03-15 NOTE — TELEPHONE ENCOUNTER
LM indicating we could not get authorization for the PET scan, however, we were able to get authorization for a CT scan. Please call  to schedule the CT. Call our office for any difficulty with scheduling.

## 2021-03-22 ENCOUNTER — HOSPITAL ENCOUNTER (OUTPATIENT)
Dept: CT IMAGING | Facility: HOSPITAL | Age: 71
Discharge: HOME | End: 2021-03-22
Attending: PHYSICIAN ASSISTANT
Payer: MEDICARE

## 2021-03-22 DIAGNOSIS — C43.72 MALIGNANT MELANOMA OF LEFT FOOT (HCC): ICD-10-CM

## 2021-03-22 DIAGNOSIS — C77.4 SECONDARY AND UNSPECIFIED MALIGNANT NEOPLASM OF INGUINAL AND LOWER LIMB LYMPH NODES (HCC): ICD-10-CM

## 2021-03-22 PROCEDURE — 71260 CT THORAX DX C+: CPT | Performed by: PHYSICIAN ASSISTANT

## 2021-03-22 PROCEDURE — 74177 CT ABD & PELVIS W/CONTRAST: CPT | Performed by: PHYSICIAN ASSISTANT

## 2021-03-22 PROCEDURE — A4216 STERILE WATER/SALINE, 10 ML: HCPCS

## 2021-03-22 RX ORDER — HEPARIN SODIUM (PORCINE) LOCK FLUSH IV SOLN 100 UNIT/ML 100 UNIT/ML
SOLUTION INTRAVENOUS
Status: COMPLETED
Start: 2021-03-22 | End: 2021-03-22

## 2021-03-22 RX ADMIN — Medication 10 ML: at 14:30:00

## 2021-03-22 RX ADMIN — HEPARIN SODIUM (PORCINE) LOCK FLUSH IV SOLN 100 UNIT/ML: 100 SOLUTION INTRAVENOUS at 14:43:00

## 2021-03-23 ENCOUNTER — TELEPHONE (OUTPATIENT)
Dept: HEMATOLOGY/ONCOLOGY | Facility: HOSPITAL | Age: 71
End: 2021-03-23

## 2021-03-23 NOTE — TELEPHONE ENCOUNTER
Called patient and let him know that he should call and schedule the PET scan that was ordered. Number to schedule provided to patient he states he will call and schedule PET today.

## 2021-03-29 RX ORDER — ACETAMINOPHEN AND CODEINE PHOSPHATE 300; 30 MG/1; MG/1
TABLET ORAL
Qty: 50 TABLET | Refills: 0 | Status: SHIPPED | OUTPATIENT
Start: 2021-03-29 | End: 2021-05-11

## 2021-03-30 ENCOUNTER — OFFICE VISIT (OUTPATIENT)
Dept: HEMATOLOGY/ONCOLOGY | Facility: HOSPITAL | Age: 71
End: 2021-03-30
Attending: NURSE PRACTITIONER
Payer: MEDICARE

## 2021-03-30 DIAGNOSIS — Z08 ENCOUNTER FOR FOLLOW-UP EXAMINATION AFTER COMPLETED TREATMENT FOR MALIGNANT NEOPLASM: ICD-10-CM

## 2021-03-30 DIAGNOSIS — C43.9 MALIGNANT MELANOMA, UNSPECIFIED SITE (HCC): ICD-10-CM

## 2021-03-30 DIAGNOSIS — C61 PROSTATE CANCER (HCC): Primary | ICD-10-CM

## 2021-03-30 DIAGNOSIS — Z71.9 COUNSELING, UNSPECIFIED: ICD-10-CM

## 2021-03-30 PROCEDURE — 99215 OFFICE O/P EST HI 40 MIN: CPT | Performed by: NURSE PRACTITIONER

## 2021-03-30 NOTE — PROGRESS NOTES
I met with Uziel Koo for a Survivorship Clinic visit to provide a survivorship care plan (SCP) and information related to post-treatment care.   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 We reviewed all elements of both documents. He is aware that a letter and SCP will be sent to his PCP, Dr. Sawyer Doshi.      Reviewed discussed importance of taking care of himself as well and we reviewed some resources that could help from [facility]. Various survivorship resources were provided to use going forward as needed (see below).      Reviewed Lifestyle/behaviors that can a

## 2021-04-01 ENCOUNTER — PATIENT MESSAGE (OUTPATIENT)
Dept: FAMILY MEDICINE CLINIC | Facility: CLINIC | Age: 71
End: 2021-04-01

## 2021-04-01 NOTE — TELEPHONE ENCOUNTER
From: Yennifer Fernando  To: Elisha Schwarz DO  Sent: 4/1/2021 8:13 AM CDT  Subject: Referral Request    Do I need a referral for my appointment in April with Dr. Connor Alston for the 6 month follow up and Injection?

## 2021-04-02 RX ORDER — SOLIFENACIN SUCCINATE 5 MG/1
TABLET, FILM COATED ORAL
Qty: 30 TABLET | Refills: 0 | Status: SHIPPED | OUTPATIENT
Start: 2021-04-02 | End: 2021-04-05

## 2021-04-02 NOTE — TELEPHONE ENCOUNTER
Received refill request for Solifenacin 5 mg  Patient's LOV within 12 months and scheduled for an OV with [facility] within the next 2 months  Medication meets refill criteria protocol, med approved    Overactive Bladder Medications    Protocol criteria:  • Appoin

## 2021-04-05 ENCOUNTER — OFFICE VISIT (OUTPATIENT)
Dept: SURGERY | Facility: CLINIC | Age: 71
End: 2021-04-05
Payer: COMMERCIAL

## 2021-04-05 VITALS
DIASTOLIC BLOOD PRESSURE: 60 MMHG | HEIGHT: 74 IN | HEART RATE: 60 BPM | BODY MASS INDEX: 31.18 KG/M2 | WEIGHT: 243 LBS | SYSTOLIC BLOOD PRESSURE: 112 MMHG

## 2021-04-05 DIAGNOSIS — N40.1 BPH WITH OBSTRUCTION/LOWER URINARY TRACT SYMPTOMS: ICD-10-CM

## 2021-04-05 DIAGNOSIS — C61 MALIGNANT NEOPLASM OF PROSTATE (HCC): Primary | ICD-10-CM

## 2021-04-05 DIAGNOSIS — N13.8 BPH WITH OBSTRUCTION/LOWER URINARY TRACT SYMPTOMS: ICD-10-CM

## 2021-04-05 DIAGNOSIS — Z79.818 ANDROGEN DEPRIVATION THERAPY: ICD-10-CM

## 2021-04-05 PROCEDURE — 3008F BODY MASS INDEX DOCD: CPT | Performed by: UROLOGY

## 2021-04-05 PROCEDURE — 96402 CHEMO HORMON ANTINEOPL SQ/IM: CPT | Performed by: UROLOGY

## 2021-04-05 PROCEDURE — 99213 OFFICE O/P EST LOW 20 MIN: CPT | Performed by: UROLOGY

## 2021-04-05 PROCEDURE — 3078F DIAST BP <80 MM HG: CPT | Performed by: UROLOGY

## 2021-04-05 PROCEDURE — 3074F SYST BP LT 130 MM HG: CPT | Performed by: UROLOGY

## 2021-04-05 RX ORDER — SOLIFENACIN SUCCINATE 5 MG/1
5 TABLET, FILM COATED ORAL DAILY
Qty: 30 TABLET | Refills: 5 | Status: SHIPPED | OUTPATIENT
Start: 2021-04-05 | End: 2021-04-28

## 2021-04-05 NOTE — PROGRESS NOTES
[facility], [facility] Urology  Follow-Up Visit    HPI: Delvin Ann is a 65year old male presents for a follow up visit. Patient was last seen on 2/8/2021. He is here by himself. INTERVAL HISTORY: Here for continuation of ADT for prostate cancer.     Per Patient decided on definitive radiation + ADT. Started bicalutamide in preparation for ADT initiation. - 10/23/19 F/U: Lupron 45 mg SubQ 6 months depot.  Plan for EBRT + brachy boost with Dr. Ron Duque.    - 12/2019 F/U: Episode of right epididymoorch allergies. REVIEW OF SYSTEMS:  Pertinent positives and negatives per HPI. A 5 point review of systems was performed and is otherwise negative.       EXAM:  /60 (BP Location: Right arm, Patient Position: Sitting, Cuff Size: large)   Pulse 60   Ht RT (EBRT + LDR brachytherapy) + ADT. BPH with incomplete bladder emptying, currently managed with CIC. Findings reviewed with patient at length. Most recent PSA from 10/2020 was 0.01 ng/mL. Instructed to complete updated PSA and testosterone levels.

## 2021-04-07 NOTE — TELEPHONE ENCOUNTER
[employer] message with recommendation sent to pt.      Future Appointments   Date Time Provider Geraldo Horner   4/12/2021  7:45 AM [facility] PET DOSE RM [facility] PET SCAN EM [facility]   4/12/2021  9:00 AM [facility] PET RM1 [facility] PET SCAN EM [facility]   6/3/2021  8:30 AM EM CC LAB2 [facility]

## 2021-04-08 ENCOUNTER — PATIENT MESSAGE (OUTPATIENT)
Dept: SURGERY | Facility: CLINIC | Age: 71
End: 2021-04-08

## 2021-04-09 ENCOUNTER — TELEPHONE (OUTPATIENT)
Dept: HEMATOLOGY/ONCOLOGY | Facility: HOSPITAL | Age: 71
End: 2021-04-09

## 2021-04-09 NOTE — TELEPHONE ENCOUNTER
I called and spoke with Sae Flores and let him know that the insurance company has not approved the PET scan yet. We will call and reschedule as soon as it is authorized. He verbalized understanding.

## 2021-04-12 ENCOUNTER — TELEPHONE (OUTPATIENT)
Dept: HEMATOLOGY/ONCOLOGY | Facility: HOSPITAL | Age: 71
End: 2021-04-12

## 2021-04-12 NOTE — TELEPHONE ENCOUNTER
Returned call to patient scheduled him for PET scan this Friday 4/16 at 10:30am- patient reminded no carbohydrates day before- and NPO for 6 hours pror to test.  Patient stated understanding and states he has instructions for PET scan and will follow instr

## 2021-04-12 NOTE — TELEPHONE ENCOUNTER
Called patient to let him know he can reschedule PET scan - It was approved today. Patient asked if I could call and schedule for him and let him know when the appointment is scheduled. LM with PET  to get exam scheduled.   Will call patient back

## 2021-04-16 ENCOUNTER — HOSPITAL ENCOUNTER (OUTPATIENT)
Dept: NUCLEAR MEDICINE | Facility: HOSPITAL | Age: 71
End: 2021-04-16
Attending: INTERNAL MEDICINE
Payer: MEDICARE

## 2021-04-16 ENCOUNTER — PATIENT MESSAGE (OUTPATIENT)
Dept: FAMILY MEDICINE CLINIC | Facility: CLINIC | Age: 71
End: 2021-04-16

## 2021-04-16 ENCOUNTER — HOSPITAL ENCOUNTER (OUTPATIENT)
Dept: NUCLEAR MEDICINE | Facility: HOSPITAL | Age: 71
Discharge: HOME | End: 2021-04-16
Attending: INTERNAL MEDICINE
Payer: MEDICARE

## 2021-04-16 DIAGNOSIS — Z85.820 ENCOUNTER FOR FOLLOW-UP SURVEILLANCE OF MELANOMA: ICD-10-CM

## 2021-04-16 DIAGNOSIS — Z08 ENCOUNTER FOR FOLLOW-UP SURVEILLANCE OF MELANOMA: ICD-10-CM

## 2021-04-16 PROCEDURE — 82962 GLUCOSE BLOOD TEST: CPT

## 2021-04-16 PROCEDURE — 78816 PET IMAGE W/CT FULL BODY: CPT | Performed by: INTERNAL MEDICINE

## 2021-04-17 ENCOUNTER — TELEPHONE (OUTPATIENT)
Dept: FAMILY MEDICINE CLINIC | Facility: CLINIC | Age: 71
End: 2021-04-17

## 2021-04-17 NOTE — TELEPHONE ENCOUNTER
From: Phu Canela  To:  Hortencia Quintero DO  Sent: 4/16/2021 5:32 PM CDT  Subject: Other    Doctor Margit Goodpasture or Doctor Erasmo:  Today I experienced a new situation, after having a bowel movement I felt a vibration which I thought was my cell phone

## 2021-04-17 NOTE — TELEPHONE ENCOUNTER
Pt called and states that he felt a vibration in his urethra yesterday while having a bowel movement as well as when self catheter. He states he has never had this occur before and didn't occur today. Pt denies any symptoms at all today.  He denies abdomina

## 2021-04-17 NOTE — TELEPHONE ENCOUNTER
----- Message from [address] [place] [address].  Ciera sent at 4/16/2021  5:32 PM CDT -----  Regarding: Other  Contact: [phone]  Doctor Duane Mackintosh or Doctor Zimmerman:  Today I experienced a new situation, after having a bowel movement I felt a vibration which I thought w

## 2021-04-19 ENCOUNTER — TELEPHONE (OUTPATIENT)
Dept: SURGERY | Facility: CLINIC | Age: 71
End: 2021-04-19

## 2021-04-19 NOTE — TELEPHONE ENCOUNTER
----- Message from [address] [place] [address].  Ciera sent at 4/16/2021  5:32 PM CDT -----  Regarding: Other  Contact: [phone]  Doctor Jacquelene Severe or Doctor Zimmerman:  Today I experienced a new situation, after having a bowel movement I felt a vibration which I thought w

## 2021-04-20 NOTE — TELEPHONE ENCOUNTER
S/W pt and informed that he may be experiencing gas bubbles as peristalsis occurs which is audibile to him. Pt states that it is gone now and I asked if he is having daily soft BM's and he confirmed and is also not having any issues with his CIC.

## 2021-04-21 ENCOUNTER — OFFICE VISIT (OUTPATIENT)
Dept: SURGERY | Facility: CLINIC | Age: 71
End: 2021-04-21

## 2021-04-21 ENCOUNTER — TELEPHONE (OUTPATIENT)
Dept: SURGERY | Facility: CLINIC | Age: 71
End: 2021-04-21

## 2021-04-21 DIAGNOSIS — C43.72 MALIGNANT MELANOMA OF LEFT FOOT (HCC): Primary | ICD-10-CM

## 2021-04-21 NOTE — TELEPHONE ENCOUNTER
Instruct the patient to continue to record theses events if they continue. This may be sensory nerve related to repetitive catheterization or possible neuropathy from low back nerve route compression. We can discuss this at the patient's next appointment.

## 2021-04-21 NOTE — TELEPHONE ENCOUNTER
Regarding: Other  Contact: [phone]  ----- Message from Gloria Glass MD sent at 4/21/2021  8:59 AM CDT -----       ----- Message from Elida Baldwin to Arabella Berman MD sent at 4/8/2021  2:48 PM -----   I have a question about TESTOSTERONE T

## 2021-04-21 NOTE — TELEPHONE ENCOUNTER
Called pt verified name and d.o.b I informed pt that Dr. Vaishali Carrasco wanted to have him come in for a cystoscopy and prostate ultrasound so we scheduled him for 04/28/2021 at 1:00pm I did inform the pt to do a fleets enema prior to the visit.

## 2021-04-21 NOTE — TELEPHONE ENCOUNTER
Spoke with patient ( verified) and relayed message below--patient verbalizes understanding and agreement.      Patient also states he spoke with urology RN yesterday (see below) and Dr. Renay Arroyo this morning--they discussed Urolift procedure and patient

## 2021-04-21 NOTE — TELEPHONE ENCOUNTER
From [facility]    Urology staff: Please contact and schedule patient for office cystoscopy and transrectal ultrasound of the prostate within the next week.

## 2021-04-28 ENCOUNTER — PROCEDURE (OUTPATIENT)
Dept: SURGERY | Facility: CLINIC | Age: 71
End: 2021-04-28
Payer: COMMERCIAL

## 2021-04-28 VITALS — SYSTOLIC BLOOD PRESSURE: 114 MMHG | DIASTOLIC BLOOD PRESSURE: 70 MMHG | HEART RATE: 60 BPM

## 2021-04-28 DIAGNOSIS — R21 PENILE RASH: ICD-10-CM

## 2021-04-28 DIAGNOSIS — Z01.818 PREOP EXAMINATION: ICD-10-CM

## 2021-04-28 DIAGNOSIS — C61 PROSTATE CANCER (HCC): ICD-10-CM

## 2021-04-28 DIAGNOSIS — N13.8 BPH WITH OBSTRUCTION/LOWER URINARY TRACT SYMPTOMS: Primary | ICD-10-CM

## 2021-04-28 DIAGNOSIS — Z79.01 MONITORING FOR ANTICOAGULANT USE: ICD-10-CM

## 2021-04-28 DIAGNOSIS — N40.1 BPH WITH OBSTRUCTION/LOWER URINARY TRACT SYMPTOMS: Primary | ICD-10-CM

## 2021-04-28 DIAGNOSIS — Z51.81 MONITORING FOR ANTICOAGULANT USE: ICD-10-CM

## 2021-04-28 PROCEDURE — 99214 OFFICE O/P EST MOD 30 MIN: CPT | Performed by: UROLOGY

## 2021-04-28 PROCEDURE — 3078F DIAST BP <80 MM HG: CPT | Performed by: UROLOGY

## 2021-04-28 PROCEDURE — 76872 US TRANSRECTAL: CPT | Performed by: UROLOGY

## 2021-04-28 PROCEDURE — 52000 CYSTOURETHROSCOPY: CPT | Performed by: UROLOGY

## 2021-04-28 PROCEDURE — 3074F SYST BP LT 130 MM HG: CPT | Performed by: UROLOGY

## 2021-04-28 RX ORDER — CLOTRIMAZOLE AND BETAMETHASONE DIPROPIONATE 10; .64 MG/G; MG/G
1 CREAM TOPICAL 2 TIMES DAILY
Qty: 1 TUBE | Refills: 0 | Status: SHIPPED | OUTPATIENT
Start: 2021-04-28 | End: 2021-05-05

## 2021-04-28 RX ORDER — CIPROFLOXACIN 500 MG/1
500 TABLET, FILM COATED ORAL ONCE
Status: COMPLETED | OUTPATIENT
Start: 2021-04-28 | End: 2021-04-28

## 2021-04-28 RX ORDER — SOLIFENACIN SUCCINATE 5 MG/1
5 TABLET, FILM COATED ORAL DAILY
Qty: 30 TABLET | Refills: 5 | Status: ON HOLD | OUTPATIENT
Start: 2021-04-28 | End: 2021-05-07

## 2021-04-28 RX ADMIN — CIPROFLOXACIN 500 MG: 500 TABLET, FILM COATED ORAL at 14:43:00

## 2021-04-28 NOTE — PROGRESS NOTES
[facility], [facility] Urology  Follow-Up Visit    HPI: Klaa Jefferson is a 65year old male presents for a follow up visit. Patient was last seen on 4/5/2021. He is here by himself.     INTERVAL HISTORY: Here for cystoscopy and TRUS of the prostate for further work-up including NM BS and PET-CT (for his melanoma) was negative. Patient decided on definitive radiation + ADT. Started bicalutamide in preparation for ADT initiation. - 10/23/19 F/U: Lupron 45 mg SubQ 6 months depot.  Plan for EBRT + brachy nivia and social history. Reviewed med list and allergies. REVIEW OF SYSTEMS:  Pertinent positives and negatives per HPI. A 5 point review of systems was performed and is otherwise negative.       EXAM:  /70      Physical Exam    Constitutional: He i probe. Representative pictures were taken of the prostate gland and these were preserved on hard copy. Pathology was noted.  Prostate volume was determined by imaging the prostate in the transverse and sagittal planes and determining maximum height, width a then discussed his BPH with LUTS and incomplete bladder emptying, OAB symptoms. Currently managed with CIC, tamsulosin, and Vesicare. Discussed findings of cystoscopy and transrectal ultrasound of the prostate as mentioned above.     Patient interested

## 2021-04-28 NOTE — PROGRESS NOTES
Patient seen in office, scheduled Cystoscopy, Uro lift Friday 05/07/2021, [facility]/outpatient, went over pre-op/lab instructions, all labs will be done tomorrow, all questions answered patient verbalized understanding.

## 2021-04-29 ENCOUNTER — LAB ENCOUNTER (OUTPATIENT)
Dept: LAB | Facility: HOSPITAL | Age: 71
End: 2021-04-29
Attending: UROLOGY
Payer: MEDICARE

## 2021-04-29 DIAGNOSIS — Z01.818 PREOP EXAMINATION: ICD-10-CM

## 2021-04-29 PROCEDURE — 87086 URINE CULTURE/COLONY COUNT: CPT | Performed by: UROLOGY

## 2021-04-29 PROCEDURE — 93005 ELECTROCARDIOGRAM TRACING: CPT

## 2021-04-29 PROCEDURE — 36415 COLL VENOUS BLD VENIPUNCTURE: CPT | Performed by: UROLOGY

## 2021-04-29 PROCEDURE — 80048 BASIC METABOLIC PNL TOTAL CA: CPT | Performed by: UROLOGY

## 2021-04-29 PROCEDURE — 85025 COMPLETE CBC W/AUTO DIFF WBC: CPT | Performed by: UROLOGY

## 2021-04-29 PROCEDURE — 81003 URINALYSIS AUTO W/O SCOPE: CPT | Performed by: UROLOGY

## 2021-04-29 PROCEDURE — 85610 PROTHROMBIN TIME: CPT | Performed by: UROLOGY

## 2021-04-29 PROCEDURE — 85730 THROMBOPLASTIN TIME PARTIAL: CPT | Performed by: UROLOGY

## 2021-04-29 PROCEDURE — 93010 ELECTROCARDIOGRAM REPORT: CPT | Performed by: UROLOGY

## 2021-05-04 ENCOUNTER — LAB ENCOUNTER (OUTPATIENT)
Dept: LAB | Facility: HOSPITAL | Age: 71
End: 2021-05-04
Attending: UROLOGY
Payer: MEDICARE

## 2021-05-04 DIAGNOSIS — Z01.818 PRE-OP TESTING: ICD-10-CM

## 2021-05-05 RX ORDER — SODIUM CHLORIDE, SODIUM LACTATE, POTASSIUM CHLORIDE, CALCIUM CHLORIDE 600; 310; 30; 20 MG/100ML; MG/100ML; MG/100ML; MG/100ML
INJECTION, SOLUTION INTRAVENOUS CONTINUOUS
Status: CANCELLED | OUTPATIENT
Start: 2021-05-05

## 2021-05-07 ENCOUNTER — ANESTHESIA EVENT (OUTPATIENT)
Dept: SURGERY | Facility: HOSPITAL | Age: 71
End: 2021-05-07
Payer: MEDICARE

## 2021-05-07 ENCOUNTER — TELEPHONE (OUTPATIENT)
Dept: SURGERY | Facility: CLINIC | Age: 71
End: 2021-05-07

## 2021-05-07 ENCOUNTER — ANESTHESIA (OUTPATIENT)
Dept: SURGERY | Facility: HOSPITAL | Age: 71
End: 2021-05-07
Payer: MEDICARE

## 2021-05-07 ENCOUNTER — HOSPITAL ENCOUNTER (OUTPATIENT)
Facility: HOSPITAL | Age: 71
Setting detail: HOSPITAL OUTPATIENT SURGERY
Discharge: HOME | End: 2021-05-07
Attending: UROLOGY | Admitting: UROLOGY
Payer: MEDICARE

## 2021-05-07 VITALS
HEIGHT: 74 IN | TEMPERATURE: 97 F | HEART RATE: 54 BPM | SYSTOLIC BLOOD PRESSURE: 143 MMHG | WEIGHT: 240 LBS | BODY MASS INDEX: 30.8 KG/M2 | RESPIRATION RATE: 18 BRPM | DIASTOLIC BLOOD PRESSURE: 76 MMHG | OXYGEN SATURATION: 97 %

## 2021-05-07 DIAGNOSIS — Z01.818 PRE-OP TESTING: Primary | ICD-10-CM

## 2021-05-07 DIAGNOSIS — N40.1 BPH WITH OBSTRUCTION/LOWER URINARY TRACT SYMPTOMS: ICD-10-CM

## 2021-05-07 DIAGNOSIS — N13.8 BPH WITH OBSTRUCTION/LOWER URINARY TRACT SYMPTOMS: ICD-10-CM

## 2021-05-07 PROCEDURE — 52441 CYSTO INSJ TRNSPRSTC 1 IMPLT: CPT | Performed by: UROLOGY

## 2021-05-07 PROCEDURE — 52442 CYSTO INS TRNSPRSTC IMPLT EA: CPT | Performed by: UROLOGY

## 2021-05-07 PROCEDURE — 0T7D8DZ DILATION OF URETHRA WITH INTRALUMINAL DEVICE, VIA NATURAL OR ARTIFICIAL OPENING ENDOSCOPIC: ICD-10-PCS | Performed by: UROLOGY

## 2021-05-07 RX ORDER — HYDROMORPHONE HYDROCHLORIDE 1 MG/ML
0.4 INJECTION, SOLUTION INTRAMUSCULAR; INTRAVENOUS; SUBCUTANEOUS EVERY 5 MIN PRN
Status: DISCONTINUED | OUTPATIENT
Start: 2021-05-07 | End: 2021-05-07

## 2021-05-07 RX ORDER — METOPROLOL TARTRATE 5 MG/5ML
2.5 INJECTION INTRAVENOUS ONCE
Status: DISCONTINUED | OUTPATIENT
Start: 2021-05-07 | End: 2021-05-07

## 2021-05-07 RX ORDER — METOCLOPRAMIDE 10 MG/1
10 TABLET ORAL ONCE
Status: COMPLETED | OUTPATIENT
Start: 2021-05-07 | End: 2021-05-07

## 2021-05-07 RX ORDER — SODIUM CHLORIDE, SODIUM LACTATE, POTASSIUM CHLORIDE, CALCIUM CHLORIDE 600; 310; 30; 20 MG/100ML; MG/100ML; MG/100ML; MG/100ML
INJECTION, SOLUTION INTRAVENOUS CONTINUOUS PRN
Status: DISCONTINUED | OUTPATIENT
Start: 2021-05-07 | End: 2021-05-07 | Stop reason: SURG

## 2021-05-07 RX ORDER — SODIUM CHLORIDE 9 MG/ML
INJECTION, SOLUTION INTRAVENOUS ONCE
Status: COMPLETED | OUTPATIENT
Start: 2021-05-07 | End: 2021-05-07

## 2021-05-07 RX ORDER — SODIUM CHLORIDE, SODIUM LACTATE, POTASSIUM CHLORIDE, CALCIUM CHLORIDE 600; 310; 30; 20 MG/100ML; MG/100ML; MG/100ML; MG/100ML
INJECTION, SOLUTION INTRAVENOUS CONTINUOUS
Status: DISCONTINUED | OUTPATIENT
Start: 2021-05-07 | End: 2021-05-07

## 2021-05-07 RX ORDER — HYDROMORPHONE HYDROCHLORIDE 1 MG/ML
0.2 INJECTION, SOLUTION INTRAMUSCULAR; INTRAVENOUS; SUBCUTANEOUS EVERY 5 MIN PRN
Status: DISCONTINUED | OUTPATIENT
Start: 2021-05-07 | End: 2021-05-07

## 2021-05-07 RX ORDER — HYDROCODONE BITARTRATE AND ACETAMINOPHEN 5; 325 MG/1; MG/1
2 TABLET ORAL AS NEEDED
Status: COMPLETED | OUTPATIENT
Start: 2021-05-07 | End: 2021-05-07

## 2021-05-07 RX ORDER — LIDOCAINE HYDROCHLORIDE 10 MG/ML
INJECTION, SOLUTION EPIDURAL; INFILTRATION; INTRACAUDAL; PERINEURAL AS NEEDED
Status: DISCONTINUED | OUTPATIENT
Start: 2021-05-07 | End: 2021-05-07 | Stop reason: SURG

## 2021-05-07 RX ORDER — NALOXONE HYDROCHLORIDE 0.4 MG/ML
80 INJECTION, SOLUTION INTRAMUSCULAR; INTRAVENOUS; SUBCUTANEOUS AS NEEDED
Status: DISCONTINUED | OUTPATIENT
Start: 2021-05-07 | End: 2021-05-07

## 2021-05-07 RX ORDER — PROCHLORPERAZINE EDISYLATE 5 MG/ML
5 INJECTION INTRAMUSCULAR; INTRAVENOUS ONCE AS NEEDED
Status: DISCONTINUED | OUTPATIENT
Start: 2021-05-07 | End: 2021-05-07

## 2021-05-07 RX ORDER — FAMOTIDINE 20 MG/1
20 TABLET ORAL ONCE
Status: DISCONTINUED | OUTPATIENT
Start: 2021-05-07 | End: 2021-05-07 | Stop reason: HOSPADM

## 2021-05-07 RX ORDER — SULFAMETHOXAZOLE AND TRIMETHOPRIM 800; 160 MG/1; MG/1
1 TABLET ORAL 2 TIMES DAILY
Qty: 10 TABLET | Refills: 0 | Status: SHIPPED | OUTPATIENT
Start: 2021-05-07 | End: 2021-05-12

## 2021-05-07 RX ORDER — HALOPERIDOL 5 MG/ML
0.25 INJECTION INTRAMUSCULAR ONCE AS NEEDED
Status: DISCONTINUED | OUTPATIENT
Start: 2021-05-07 | End: 2021-05-07

## 2021-05-07 RX ORDER — HYDROCODONE BITARTRATE AND ACETAMINOPHEN 5; 325 MG/1; MG/1
1 TABLET ORAL EVERY 4 HOURS PRN
Status: DISCONTINUED | OUTPATIENT
Start: 2021-05-07 | End: 2021-05-07

## 2021-05-07 RX ORDER — ONDANSETRON 2 MG/ML
4 INJECTION INTRAMUSCULAR; INTRAVENOUS ONCE AS NEEDED
Status: DISCONTINUED | OUTPATIENT
Start: 2021-05-07 | End: 2021-05-07

## 2021-05-07 RX ORDER — MORPHINE SULFATE 4 MG/ML
2 INJECTION, SOLUTION INTRAMUSCULAR; INTRAVENOUS EVERY 10 MIN PRN
Status: DISCONTINUED | OUTPATIENT
Start: 2021-05-07 | End: 2021-05-07

## 2021-05-07 RX ORDER — LIDOCAINE HYDROCHLORIDE 20 MG/ML
JELLY TOPICAL AS NEEDED
Status: DISCONTINUED | OUTPATIENT
Start: 2021-05-07 | End: 2021-05-07 | Stop reason: HOSPADM

## 2021-05-07 RX ORDER — MORPHINE SULFATE 10 MG/ML
6 INJECTION, SOLUTION INTRAMUSCULAR; INTRAVENOUS EVERY 10 MIN PRN
Status: DISCONTINUED | OUTPATIENT
Start: 2021-05-07 | End: 2021-05-07

## 2021-05-07 RX ORDER — ACETAMINOPHEN 325 MG/1
650 TABLET ORAL EVERY 4 HOURS PRN
Status: DISCONTINUED | OUTPATIENT
Start: 2021-05-07 | End: 2021-05-07

## 2021-05-07 RX ORDER — MORPHINE SULFATE 4 MG/ML
4 INJECTION, SOLUTION INTRAMUSCULAR; INTRAVENOUS EVERY 10 MIN PRN
Status: DISCONTINUED | OUTPATIENT
Start: 2021-05-07 | End: 2021-05-07

## 2021-05-07 RX ORDER — ACETAMINOPHEN 500 MG
1000 TABLET ORAL ONCE
Status: COMPLETED | OUTPATIENT
Start: 2021-05-07 | End: 2021-05-07

## 2021-05-07 RX ORDER — HYDROCODONE BITARTRATE AND ACETAMINOPHEN 5; 325 MG/1; MG/1
1 TABLET ORAL AS NEEDED
Status: COMPLETED | OUTPATIENT
Start: 2021-05-07 | End: 2021-05-07

## 2021-05-07 RX ORDER — HYDROCODONE BITARTRATE AND ACETAMINOPHEN 5; 325 MG/1; MG/1
2 TABLET ORAL EVERY 4 HOURS PRN
Status: DISCONTINUED | OUTPATIENT
Start: 2021-05-07 | End: 2021-05-07

## 2021-05-07 RX ORDER — HYDROMORPHONE HYDROCHLORIDE 1 MG/ML
0.6 INJECTION, SOLUTION INTRAMUSCULAR; INTRAVENOUS; SUBCUTANEOUS EVERY 5 MIN PRN
Status: DISCONTINUED | OUTPATIENT
Start: 2021-05-07 | End: 2021-05-07

## 2021-05-07 RX ADMIN — LIDOCAINE HYDROCHLORIDE 20 MG: 10 INJECTION, SOLUTION EPIDURAL; INFILTRATION; INTRACAUDAL; PERINEURAL at 10:08:00

## 2021-05-07 RX ADMIN — SODIUM CHLORIDE, SODIUM LACTATE, POTASSIUM CHLORIDE, CALCIUM CHLORIDE: 600; 310; 30; 20 INJECTION, SOLUTION INTRAVENOUS at 10:04:00

## 2021-05-07 NOTE — OPERATIVE REPORT
[facility]   Urology Operative Note     Emil Bell Location: OR   CSN [id number] MRN [id number]   Admission Date 5/7/2021 Operation Date 5/7/2021   Service Urology Surgeon Bobby Howard MD      Primary Surgeon: Bobby Howard MD Procedure Performed: Cystoscopy, Prostatic Urethral Lift (UroLift) procedure. Anesthesia: MAC IV sedation and 2% lidocaine jelly per urethra. Surgical Findings:   1. Trilobar hypertrophy with enlarged lateral lobes and median lobe.  Total of 5 Ur being centered in the delivery bay. The urethral end piece was then affixed to the monofilament thereby tailoring the size of the implant. Excess filament was then severed. The delivery device was then readvanced into the bladder.   The delivery device wa with Davis catheter in place. Electronic prescriptions for Bactrim DS p.o. twice daily for 5 days. May resume aspirin on Monday, 5/10/2021 if urine clear. Stop solifenacin. Continue Flomax. We will schedule voiding trial in the office in 5 to 7 days.

## 2021-05-07 NOTE — ANESTHESIA PREPROCEDURE EVALUATION
Anesthesia PreOp Note    HPI:     Yvonne Ornelas is a 65year old male who presents for preoperative consultation requested by: Tressa Sandoval MD    Date of Surgery: 5/7/2021    Procedure(s):  cystoscopy, uro lift  Indication: BPH with obstruction/low stones         Date Noted: 04/11/2018      Asthma with COPD (chronic obstructive pulmonary disease) ([facility] [address]         Date Noted: 03/05/2018      Rectal bleeding         Date Noted: 02/23/2018      Encounter for medical examination to establish care         Da SENT LYMPH NODE BIOPSY Left 03/08/2019    inguinal and popliteal   • SPINE SURGERY PROCEDURE UNLISTED  2000    lumbar discectomy   • SPLIT GRFT PROC HEAD,FAC,HAND <100CM Left 03/08/2019    heel reconstruction       leuprolide (LUPRON) depot injection 45 mg mg by mouth nightly.  , Disp: , Rfl: , 4/29  Catheters (BARD COUDE TIP CATHETER) Does not apply Misc, Pt to use a 14 Fr coude tip straight cath to perform clean intermittent catheterization 3 times daily, and additional if necessary, as instructed, indmariahin Smoker      Smokeless tobacco: Never Used    Vaping Use      Vaping Use: Never used    Substance and Sexual Activity      Alcohol use: No      Drug use: No      Sexual activity: Not on file    Other Topics      Concerns:        Grew up on a farm: Not Asked of Exercise per Session:   Stress:       Feeling of Stress :   Social Connections:       Frequency of Communication with Friends and Family:       Frequency of Social Gatherings with Friends and Family:       Attends Faith Services:       Active Member Anesthesia Plan:   ASA:  3  Plan:   General  Airway:  LMA  Post-op Pain Management: IV analgesics      I have informed Arno Stain and/or legal guardian or family member of the nature of the anesthetic plan, benefits, risks including pos

## 2021-05-07 NOTE — H&P
History & Physical Examination    Patient Name: Corey Lee  MRN: [id number]  CSN: [id number]  YOB: 1950    Diagnosis: BPH with LUTS and urinary retention. Prostate Cancer s/p definitive RT.      Present Illness: Pollo Matthews is a very 6 months of anticoagulation using Xarelto; etiology of this clot is unknown, doesn't appear catheter induced   • Diverticular disease 1992   • Esophageal reflux    • Exposure to medical diagnostic radiation     25 cycles   • Gastroenteritis 2015   • Gastro grandfather has colon cancer   • Bleeding Disorders Neg    • Clotting Disorder Neg    • Sickle Cell Neg      Social History    Tobacco Use      Smoking status: Never Smoker      Smokeless tobacco: Never Used    Alcohol use: No      SYSTEM Check if Review i

## 2021-05-07 NOTE — TELEPHONE ENCOUNTER
I called pt to offer courtney with Vanessa MOREL for a fill up decath, I will sent this message to pt SanteVet.

## 2021-05-07 NOTE — TELEPHONE ENCOUNTER
Patient is s/p urolift procedure today 5/7/21. He was discharged home with a rodriguez catheter. Please bring him in on Wednesday or Thursday (5/12 or 5/13) for a voiding trial with me or with Kanika.      Thanks

## 2021-05-07 NOTE — ANESTHESIA POSTPROCEDURE EVALUATION
Patient: Kavin Milligan    Procedure Summary     Date: 05/07/21 Room / Location: [address] [place] [address] MAIN OR 14 / [address] [place] [address] MAIN OR    Anesthesia Start: 1004 Anesthesia Stop: 7119    Procedure: cystoscopy, uro lift, prostatic urethral lift (N/A ) Diagnosis:       BPH with obs

## 2021-05-11 ENCOUNTER — PATIENT MESSAGE (OUTPATIENT)
Dept: SURGERY | Facility: CLINIC | Age: 71
End: 2021-05-11

## 2021-05-11 DIAGNOSIS — G62.9 NEUROPATHY: ICD-10-CM

## 2021-05-11 RX ORDER — GABAPENTIN 300 MG/1
CAPSULE ORAL
Qty: 270 CAPSULE | Refills: 3 | Status: SHIPPED | OUTPATIENT
Start: 2021-05-11 | End: 2021-05-19

## 2021-05-11 RX ORDER — ACETAMINOPHEN AND CODEINE PHOSPHATE 300; 30 MG/1; MG/1
1 TABLET ORAL EVERY 4 HOURS PRN
Qty: 50 TABLET | Refills: 0 | Status: SHIPPED | OUTPATIENT
Start: 2021-05-11 | End: 2021-07-01

## 2021-05-11 NOTE — TELEPHONE ENCOUNTER
From: Leslie Reynoso  To: Armen Thrasher MD  Sent: 5/11/2021 4:29 AM CDT  Subject: Other    This morning my use of the toilet showed joni colored stool, is this something I should be concern about?

## 2021-05-12 NOTE — TELEPHONE ENCOUNTER
Called pastor Tasha Hutchinson and spoke to him over the phone. He was inquiring about UroLift procedure to resolve his urinary retention or at least improve his voiding and decrease CIC frequency.     Discussed the procedure including rationale, approach, benefits, Per Dr. Ramirez pt should come in today to have IUD removed.  TC placed back to pt, pt scheduled for appointment today at 11:45.

## 2021-05-13 ENCOUNTER — OFFICE VISIT (OUTPATIENT)
Dept: SURGERY | Facility: CLINIC | Age: 71
End: 2021-05-13
Payer: COMMERCIAL

## 2021-05-13 VITALS
HEART RATE: 58 BPM | SYSTOLIC BLOOD PRESSURE: 118 MMHG | DIASTOLIC BLOOD PRESSURE: 72 MMHG | WEIGHT: 240 LBS | RESPIRATION RATE: 16 BRPM | BODY MASS INDEX: 30.8 KG/M2 | HEIGHT: 74 IN

## 2021-05-13 DIAGNOSIS — N40.1 BPH WITH OBSTRUCTION/LOWER URINARY TRACT SYMPTOMS: Primary | ICD-10-CM

## 2021-05-13 DIAGNOSIS — N13.8 BPH WITH OBSTRUCTION/LOWER URINARY TRACT SYMPTOMS: Primary | ICD-10-CM

## 2021-05-13 PROCEDURE — 3078F DIAST BP <80 MM HG: CPT | Performed by: NURSE PRACTITIONER

## 2021-05-13 PROCEDURE — 99024 POSTOP FOLLOW-UP VISIT: CPT | Performed by: NURSE PRACTITIONER

## 2021-05-13 PROCEDURE — 3008F BODY MASS INDEX DOCD: CPT | Performed by: NURSE PRACTITIONER

## 2021-05-13 PROCEDURE — 3074F SYST BP LT 130 MM HG: CPT | Performed by: NURSE PRACTITIONER

## 2021-05-13 NOTE — PROGRESS NOTES
[facility], [facility] Urology  Follow-Up Visit    HPI: Silvana Hodge is a 65year old male presents for a follow up visit. Patient was last seen on 4/28/2021 by Dr. Jacob Alvarado. He is here by himself.     INTERVAL HISTORY: s/p cystoscopy, prostatic urethral lift Switched to cefadroxil 1 g twice daily for 14 days when seen in the office. Developed a reactive right hydrocele.     - 2/2020 F/U: completed 45 Gy of EBRT at [facility] 1/30/2020.     - 3/17/20: LDR brachytherapy boost by Dr. Ron Duque    - 4/22/20: eligard 45 mg Silver Physical Exam    Constitutional: He is oriented to person, place, and time. He appears well-developed. No distress. HENT:   Head: Normocephalic. Eyes: Conjunctivae are normal.   Cardiovascular: Normal rate.     Pulmonary/Chest: Effort normal.   Amaury Hay were taken of the prostate gland and these were preserved on hard copy. Pathology was noted.  Prostate volume was determined by imaging the prostate in the transverse and sagittal planes and determining maximum height, width and length dimensions.      FIND with about 75 ml of sterile saline. 10 ml of fluid was removed from balloon and catheter was removed. Patient was able to void about 125 ml of clear yellow urine. He felt bladder was empty.   He did continue to have some spontaneous leakage from urethr

## 2021-05-17 DIAGNOSIS — G62.9 NEUROPATHY: ICD-10-CM

## 2021-05-19 ENCOUNTER — TELEPHONE (OUTPATIENT)
Dept: HEMATOLOGY/ONCOLOGY | Facility: HOSPITAL | Age: 71
End: 2021-05-19

## 2021-05-19 RX ORDER — GABAPENTIN 300 MG/1
CAPSULE ORAL
Qty: 270 CAPSULE | Refills: 3 | Status: SHIPPED | OUTPATIENT
Start: 2021-05-19

## 2021-05-19 NOTE — TELEPHONE ENCOUNTER
Trey called. He states Geovanny Hutchinson called him yesterday and he would like to provide information to her. He did get the gabapentin rx filled.  He would like all future prescriptions to be sent to [facility] with a 90 day supply as it is less money out of pock

## 2021-05-20 DIAGNOSIS — E78.2 MIXED HYPERLIPIDEMIA: ICD-10-CM

## 2021-05-20 DIAGNOSIS — K21.9 GASTROESOPHAGEAL REFLUX DISEASE: ICD-10-CM

## 2021-05-20 DIAGNOSIS — I10 ESSENTIAL HYPERTENSION: ICD-10-CM

## 2021-05-20 RX ORDER — LOSARTAN POTASSIUM 100 MG/1
100 TABLET ORAL DAILY
Qty: 90 TABLET | Refills: 1 | Status: SHIPPED | OUTPATIENT
Start: 2021-05-20 | End: 2021-05-24

## 2021-05-20 RX ORDER — POTASSIUM CHLORIDE 750 MG/1
10 TABLET, EXTENDED RELEASE ORAL DAILY
Qty: 90 TABLET | Refills: 1 | Status: SHIPPED | OUTPATIENT
Start: 2021-05-20 | End: 2021-05-24

## 2021-05-20 RX ORDER — METOPROLOL SUCCINATE 100 MG/1
150 TABLET, EXTENDED RELEASE ORAL DAILY
Qty: 135 TABLET | Refills: 1 | Status: SHIPPED | OUTPATIENT
Start: 2021-05-20 | End: 2021-05-24

## 2021-05-20 RX ORDER — SIMVASTATIN 80 MG
80 TABLET ORAL EVERY EVENING
Qty: 90 TABLET | Refills: 2 | Status: SHIPPED | OUTPATIENT
Start: 2021-05-20 | End: 2021-05-24

## 2021-05-20 RX ORDER — FUROSEMIDE 40 MG/1
40 TABLET ORAL EVERY MORNING
Qty: 90 TABLET | Refills: 1 | Status: SHIPPED | OUTPATIENT
Start: 2021-05-20 | End: 2021-05-24

## 2021-05-20 RX ORDER — PANTOPRAZOLE SODIUM 40 MG/1
40 TABLET, DELAYED RELEASE ORAL
Qty: 90 TABLET | Refills: 1 | Status: SHIPPED | OUTPATIENT
Start: 2021-05-20 | End: 2021-10-21

## 2021-05-20 NOTE — TELEPHONE ENCOUNTER
•  METOPROLOL SUCCINATE  MG Oral Tablet 24 Hr, TAKE 1 AND 1/2 TABLETS(150 MG) BY MOUTH DAILY, Disp: 135 tablet, Rfl: 1    •  LOSARTAN 100 MG Oral Tab, TAKE 1 TABLET(100 MG) BY MOUTH DAILY, Disp: 90 tablet, Rfl: 1    •  PANTOPRAZOLE SODIUM 40 MG Ora

## 2021-05-23 DIAGNOSIS — I10 ESSENTIAL HYPERTENSION: ICD-10-CM

## 2021-05-23 DIAGNOSIS — E78.2 MIXED HYPERLIPIDEMIA: ICD-10-CM

## 2021-05-24 RX ORDER — METOPROLOL SUCCINATE 100 MG/1
TABLET, EXTENDED RELEASE ORAL
Qty: 135 TABLET | Refills: 1 | Status: SHIPPED | OUTPATIENT
Start: 2021-05-24 | End: 2021-10-21

## 2021-05-24 RX ORDER — POTASSIUM CHLORIDE 750 MG/1
TABLET, EXTENDED RELEASE ORAL
Qty: 90 TABLET | Refills: 1 | Status: SHIPPED | OUTPATIENT
Start: 2021-05-24 | End: 2021-10-21

## 2021-05-24 RX ORDER — SIMVASTATIN 80 MG
TABLET ORAL
Qty: 90 TABLET | Refills: 2 | Status: SHIPPED | OUTPATIENT
Start: 2021-05-24 | End: 2021-12-29

## 2021-05-24 RX ORDER — LOSARTAN POTASSIUM 100 MG/1
TABLET ORAL
Qty: 90 TABLET | Refills: 1 | Status: SHIPPED | OUTPATIENT
Start: 2021-05-24 | End: 2021-10-21

## 2021-05-24 RX ORDER — FUROSEMIDE 40 MG/1
TABLET ORAL
Qty: 90 TABLET | Refills: 1 | Status: SHIPPED | OUTPATIENT
Start: 2021-05-24 | End: 2021-10-21

## 2021-05-28 ENCOUNTER — PATIENT MESSAGE (OUTPATIENT)
Dept: FAMILY MEDICINE CLINIC | Facility: CLINIC | Age: 71
End: 2021-05-28

## 2021-05-28 NOTE — TELEPHONE ENCOUNTER
From: Cezar Garcia  To: Rodo Cho DO  Sent: 5/28/2021 6:31 PM CDT  Subject: Other    Four of my prescriptions have already been filled by [facility] pharmacies that show on the attached. I have change prescription services to [address] [place] [address].  How

## 2021-05-31 PROCEDURE — 93296 REM INTERROG EVL PM/IDS: CPT | Performed by: INTERNAL MEDICINE

## 2021-05-31 PROCEDURE — 93295 DEV INTERROG REMOTE 1/2/MLT: CPT | Performed by: INTERNAL MEDICINE

## 2021-06-01 ENCOUNTER — TELEPHONE (OUTPATIENT)
Dept: FAMILY MEDICINE CLINIC | Facility: CLINIC | Age: 71
End: 2021-06-01

## 2021-06-01 NOTE — TELEPHONE ENCOUNTER
Baljinder Clarke with [facility] called. She advised patient participates in a biometric program where he checks his weight & Bio signs on a daily basis. She advised the patietn has reported fluctuating weights going on for a while and his is up 7 Lbs in 2 days. 5/27 - 240.5  5/28 - 245.5  5/29 - 240 5/30 - 239 5/31 - 244 6/01 - 246    Also, patient is reporting swelling in his Right Ankle & Abdomin seems a little more swollen as well. Please Advise.

## 2021-06-02 ENCOUNTER — OFFICE VISIT (OUTPATIENT)
Dept: SURGERY | Facility: CLINIC | Age: 71
End: 2021-06-02
Payer: COMMERCIAL

## 2021-06-02 VITALS — HEART RATE: 55 BPM | DIASTOLIC BLOOD PRESSURE: 65 MMHG | SYSTOLIC BLOOD PRESSURE: 111 MMHG

## 2021-06-02 DIAGNOSIS — N13.8 BPH WITH OBSTRUCTION/LOWER URINARY TRACT SYMPTOMS: Primary | ICD-10-CM

## 2021-06-02 DIAGNOSIS — N40.1 BPH WITH OBSTRUCTION/LOWER URINARY TRACT SYMPTOMS: Primary | ICD-10-CM

## 2021-06-02 DIAGNOSIS — C61 PROSTATE CANCER (HCC): ICD-10-CM

## 2021-06-02 PROCEDURE — 3074F SYST BP LT 130 MM HG: CPT | Performed by: UROLOGY

## 2021-06-02 PROCEDURE — 99213 OFFICE O/P EST LOW 20 MIN: CPT | Performed by: UROLOGY

## 2021-06-02 PROCEDURE — 3078F DIAST BP <80 MM HG: CPT | Performed by: UROLOGY

## 2021-06-02 NOTE — PROGRESS NOTES
[facility], [facility] Urology  Follow-Up Visit    HPI: Yemi Valenzuela is a 65year old male presents for a follow up visit. Patient was last seen on 5/13/2021 by Claudia Bardales. He is here by himself. INTERVAL HISTORY: status post UroLift 5/7/2021.   Cath Metastatic work-up including NM BS and PET-CT (for his melanoma) was negative. Patient decided on definitive radiation + ADT. Started bicalutamide in preparation for ADT initiation. - 10/23/19 F/U: Lupron 45 mg SubQ 6 months depot.  Plan for EBRT UroLift procedure. Catheter removed 5/13/2021.    - 6/2021 F/U: Decrease CIC to 2-3 times daily down from 5-7 times. Voiding in between. PVR after CIC between 150 and 225 mL. Vesicare stopped. Clyde Skinner is  and has 2 daughters.  He disease. There are degenerative changes in the shoulders, hands, knees and feet bilaterally. There are also degenerative changes in the cervical spine.              IMPRESSION:  65year old -American male with very high risk prostate cancer (cT1c c

## 2021-06-03 ENCOUNTER — NURSE ONLY (OUTPATIENT)
Dept: HEMATOLOGY/ONCOLOGY | Facility: HOSPITAL | Age: 71
End: 2021-06-03
Attending: INTERNAL MEDICINE
Payer: MEDICARE

## 2021-06-03 VITALS
HEART RATE: 56 BPM | DIASTOLIC BLOOD PRESSURE: 54 MMHG | RESPIRATION RATE: 16 BRPM | TEMPERATURE: 98 F | OXYGEN SATURATION: 98 % | WEIGHT: 246 LBS | HEIGHT: 74 IN | BODY MASS INDEX: 31.57 KG/M2 | SYSTOLIC BLOOD PRESSURE: 114 MMHG

## 2021-06-03 DIAGNOSIS — C61 PROSTATE CANCER (HCC): Primary | ICD-10-CM

## 2021-06-03 DIAGNOSIS — Z08 ENCOUNTER FOR FOLLOW-UP SURVEILLANCE OF MELANOMA: Primary | ICD-10-CM

## 2021-06-03 DIAGNOSIS — Z08 ENCOUNTER FOR FOLLOW-UP SURVEILLANCE OF MELANOMA: ICD-10-CM

## 2021-06-03 DIAGNOSIS — D50.8 OTHER IRON DEFICIENCY ANEMIA: ICD-10-CM

## 2021-06-03 DIAGNOSIS — Z85.820 ENCOUNTER FOR FOLLOW-UP SURVEILLANCE OF MELANOMA: ICD-10-CM

## 2021-06-03 DIAGNOSIS — Z85.820 ENCOUNTER FOR FOLLOW-UP SURVEILLANCE OF MELANOMA: Primary | ICD-10-CM

## 2021-06-03 PROCEDURE — 85025 COMPLETE CBC W/AUTO DIFF WBC: CPT

## 2021-06-03 PROCEDURE — 82728 ASSAY OF FERRITIN: CPT

## 2021-06-03 PROCEDURE — 83540 ASSAY OF IRON: CPT

## 2021-06-03 PROCEDURE — 99214 OFFICE O/P EST MOD 30 MIN: CPT | Performed by: INTERNAL MEDICINE

## 2021-06-03 PROCEDURE — 84466 ASSAY OF TRANSFERRIN: CPT

## 2021-06-03 PROCEDURE — 36591 DRAW BLOOD OFF VENOUS DEVICE: CPT

## 2021-06-03 PROCEDURE — 80053 COMPREHEN METABOLIC PANEL: CPT

## 2021-06-03 RX ORDER — 0.9 % SODIUM CHLORIDE 0.9 %
10 VIAL (ML) INJECTION ONCE
Status: CANCELLED | OUTPATIENT
Start: 2021-06-03

## 2021-06-03 RX ORDER — SODIUM CHLORIDE 9 MG/ML
INJECTION INTRAVENOUS
Status: DISCONTINUED
Start: 2021-06-03 | End: 2021-06-03

## 2021-06-03 RX ORDER — HEPARIN SODIUM (PORCINE) LOCK FLUSH IV SOLN 100 UNIT/ML 100 UNIT/ML
5 SOLUTION INTRAVENOUS ONCE
Status: COMPLETED | OUTPATIENT
Start: 2021-06-03 | End: 2021-06-03

## 2021-06-03 RX ORDER — FERROUS SULFATE 325(65) MG
325 TABLET ORAL
Qty: 30 TABLET | Refills: 5 | Status: ON HOLD | OUTPATIENT
Start: 2021-06-03 | End: 2021-09-24

## 2021-06-03 RX ORDER — HEPARIN SODIUM (PORCINE) LOCK FLUSH IV SOLN 100 UNIT/ML 100 UNIT/ML
5 SOLUTION INTRAVENOUS ONCE
Status: CANCELLED | OUTPATIENT
Start: 2021-06-03

## 2021-06-03 RX ADMIN — HEPARIN SODIUM (PORCINE) LOCK FLUSH IV SOLN 100 UNIT/ML 500 UNITS: 100 SOLUTION INTRAVENOUS at 08:34:00

## 2021-06-03 NOTE — PROGRESS NOTES
Cancer Center Progress Note    Patient Name: Angi Hall   YOB: 1950   Medical Record Number: [id number]   CSN: [id number]  Consulting Physician: Blanca Rust MD  Referring Physician(s): Efren Yan  Date of Visit: 6/03/2021    C closure with split- thickness skin graft from right thigh by plastic surgeon, Dr. Hermila Nicholson. Pearl Colonbrian presents at the time of consultation alone.   He reports being in his usual state of health over the last few months prior to diagnosis or curre on 3/17/20. He is sleeping better with less fatigue based on less of a need for self catheterizing based on his incomplete bladder emptying feature. Crystal Lockhart mentions blood loss from the self catheterization is his only source of blood loss. COLONOSCOPY;  Surgeon: Jones Bosworth, MD;  Location: [address] [place] [address] ENDOSCOPY   • CORONARY ST[facility] PL[facility]EMENT  2004    [facility]   • CYSTO INSERTION TRANSPROSTATIC IMPLANT SINGLE  05/07/2021    urolift; Dr. Nnamdi Vogel.    • EXC SKIN MALIG >4CM TRUNK,ARM,LEG Left 03/08 sunburns in the past: Not Asked        Tanning Salons in the Past: Not Asked        Hx of Radiation Treatments: Not Asked        Regular use of sun block: Not Asked         Service: Not Asked        Blood Transfusions: Not Asked        Caffeine Con total) by mouth daily with breakfast. Take with vitamin C for better absorption, Disp: 30 tablet, Rfl: 5  FUROSEMIDE 40 MG Oral Tab, TAKE 1 TABLET(40 MG) BY MOUTH EVERY MORNING, Disp: 90 tablet, Rfl: 1  POTASSIUM CHLORIDE ER 10 MEQ Oral Tab CR, TAKE 1 TABL , Rfl:     leuprolide (LUPRON) depot injection 45 mg, 45 mg, Subcutaneous, Q6 Months, Anais Patton MD, 45 mg at 04/05/21 1030        Review of Systems:    Constitutional: Negative for anorexia, chills, fevers, night sweats and weight loss, no fatigue Performance Status:    ECOG 0: Fully active, able to carry on all pre-disease performance without restriction      Labs:    Lab Results   Component Value Date/Time    WBC 4.2 06/03/2021 08:24 AM    RBC 3.80 06/03/2021 08:24 AM    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 5. Prostate brachytherapy seeds visualized   6. Extensive coronary artery atherosclerotic disease. 7. Bilateral fat containing inguinal hernias.    8. Scoliosis and multilevel thoracolumbar spondylosis most pronounced at L4-5 and L5 Dr. Ioana Kingston and he's been looked at again and may need another skin closure procedure; will see her in 1 week to reassess if the wound has healed.     --Patient's tumor is negative for BRAF-V600 mutation, so will not need combination oral chemotherapy us clinical M0 disease via PET/CT scan. We have reviewed his CT brain with and without contrast shows no evidence of any intracranial metastasis.   Patient could not undergo MRI brain based on prior cardiac device implant    --ordered pt for survivorship visit [facility]

## 2021-06-04 ENCOUNTER — OFFICE VISIT (OUTPATIENT)
Dept: FAMILY MEDICINE CLINIC | Facility: CLINIC | Age: 71
End: 2021-06-04
Payer: COMMERCIAL

## 2021-06-04 VITALS
SYSTOLIC BLOOD PRESSURE: 106 MMHG | RESPIRATION RATE: 17 BRPM | TEMPERATURE: 97 F | BODY MASS INDEX: 31.95 KG/M2 | DIASTOLIC BLOOD PRESSURE: 64 MMHG | WEIGHT: 249 LBS | HEIGHT: 74 IN | HEART RATE: 59 BPM

## 2021-06-04 DIAGNOSIS — E78.5 HYPERLIPIDEMIA, UNSPECIFIED HYPERLIPIDEMIA TYPE: ICD-10-CM

## 2021-06-04 DIAGNOSIS — C43.72 MALIGNANT MELANOMA OF LEFT FOOT (HCC): ICD-10-CM

## 2021-06-04 DIAGNOSIS — I25.2 HISTORY OF MI (MYOCARDIAL INFARCTION): ICD-10-CM

## 2021-06-04 DIAGNOSIS — J44.9 ASTHMA WITH COPD (CHRONIC OBSTRUCTIVE PULMONARY DISEASE) (HCC): Chronic | ICD-10-CM

## 2021-06-04 DIAGNOSIS — Z13.29 THYROID DISORDER SCREEN: ICD-10-CM

## 2021-06-04 DIAGNOSIS — Z00.00 MEDICARE ANNUAL WELLNESS VISIT, INITIAL: Primary | ICD-10-CM

## 2021-06-04 PROCEDURE — 99397 PER PM REEVAL EST PAT 65+ YR: CPT | Performed by: FAMILY MEDICINE

## 2021-06-04 PROCEDURE — 96160 PT-FOCUSED HLTH RISK ASSMT: CPT | Performed by: FAMILY MEDICINE

## 2021-06-04 PROCEDURE — 3078F DIAST BP <80 MM HG: CPT | Performed by: FAMILY MEDICINE

## 2021-06-04 PROCEDURE — 3074F SYST BP LT 130 MM HG: CPT | Performed by: FAMILY MEDICINE

## 2021-06-04 PROCEDURE — 3008F BODY MASS INDEX DOCD: CPT | Performed by: FAMILY MEDICINE

## 2021-06-04 PROCEDURE — G0439 PPPS, SUBSEQ VISIT: HCPCS | Performed by: FAMILY MEDICINE

## 2021-06-04 NOTE — PROGRESS NOTES
HPI/Subjective:   Patient ID: Corinna Jones is a 65year old male.     65year old AA male here for complete preventive care physical and for status update on any confirmed chronic medical illnesses and follow up on any previous labs or procedures that w indefinitely for the treatment of urinary retention related to his history of radiation therapy for his prostate cancer. CPT--28478 90 each 11   • Alpha-D-Galactosidase (BEANO ULTRA 800 OR) Take by mouth.      • Cetirizine HCl (ZYRTEC ALLERGY) 10 MG Oral Immunization Activity if applicable    Hepatitis B No orders found for this or any previous visit.  Update Immunization Activity if applicable    Tetanus Orders placed or performed in visit on 04/13/18   • TETANUS, [address] AND ACELLULAR PERTUSIS Yes    Have you had any immunizations at another office such as Influenza, Hepatitis B, Tetanus, or Pneumococcal?: No     Functional Ability     Bathing or Showering: Able without help    Toileting: Able without help    Dressing: Able without help    Eatin 96.6 °F (35.9 °C)       Physical Exam  Constitutional:       Appearance: Normal appearance. Neck:      Thyroid: No thyroid mass or thyromegaly. Cardiovascular:      Rate and Rhythm: Normal rate and regular rhythm. Heart sounds: Murmur heard.    No

## 2021-06-08 ENCOUNTER — ANCILLARY PROCEDURE (OUTPATIENT)
Dept: CARDIOLOGY | Age: 71
End: 2021-06-08
Attending: INTERNAL MEDICINE

## 2021-06-08 ENCOUNTER — ANCILLARY ORDERS (OUTPATIENT)
Dept: CARDIOLOGY | Age: 71
End: 2021-06-08

## 2021-06-08 DIAGNOSIS — Z45.02 ENCOUNTER FOR ASSESSMENT OF IMPLANTABLE CARDIOVERTER-DEFIBRILLATOR (ICD): ICD-10-CM

## 2021-06-08 PROCEDURE — X1114 CARDIAC DEVICE HOME CHECK - REMOTE UNSCHEDULED: HCPCS | Performed by: INTERNAL MEDICINE

## 2021-06-21 ENCOUNTER — PATIENT MESSAGE (OUTPATIENT)
Dept: HEMATOLOGY/ONCOLOGY | Facility: HOSPITAL | Age: 71
End: 2021-06-21

## 2021-06-21 ENCOUNTER — TELEPHONE (OUTPATIENT)
Dept: HEMATOLOGY/ONCOLOGY | Facility: HOSPITAL | Age: 71
End: 2021-06-21

## 2021-06-21 NOTE — TELEPHONE ENCOUNTER
Called Trey with recommendation from Dr Cuco Benedict to increase iron in diet and may hold off with iron tablets. He verbalizes understanding.

## 2021-06-21 NOTE — TELEPHONE ENCOUNTER
Regarding: FW: Other  Hi Yamila,    This seems like a good plan of action. If his BP has restored and his diarrhea is now controlled. Yes, I would agree he can hold off on taking the iron pills.      Thanks,  HW  ----- Message -----  From: Taj Miramontes RN

## 2021-06-21 NOTE — TELEPHONE ENCOUNTER
[facility], he has had increased number of stools per day, diarrhea. He is also on lasix. He had experienced a period of dizziness and his BP was low he pushed more fluids thinking he was dehydrated.   Was able to bring up his BP but was not sure what

## 2021-06-21 NOTE — TELEPHONE ENCOUNTER
----- Message from [address] [place] [address].  Ciera sent at 6/21/2021  3:01 PM CDT -----  Regarding: Other  Contact: [phone]  Good afternoon Dr. Arabella Olivares this is Rebecca Zepeda July 4, 1950  I am contacting you because on Thursday, June 17, 2021 I experienced dizzines

## 2021-07-01 RX ORDER — ACETAMINOPHEN AND CODEINE PHOSPHATE 300; 30 MG/1; MG/1
TABLET ORAL
Qty: 50 TABLET | Refills: 0 | Status: SHIPPED | OUTPATIENT
Start: 2021-07-01 | End: 2021-08-03

## 2021-07-15 DIAGNOSIS — N40.0 BENIGN PROSTATIC HYPERPLASIA, UNSPECIFIED WHETHER LOWER URINARY TRACT SYMPTOMS PRESENT: ICD-10-CM

## 2021-07-16 RX ORDER — TAMSULOSIN HYDROCHLORIDE 0.4 MG/1
0.4 CAPSULE ORAL DAILY
Qty: 90 CAPSULE | Refills: 1 | Status: SHIPPED | OUTPATIENT
Start: 2021-07-16 | End: 2021-10-04

## 2021-08-02 ENCOUNTER — NURSE TRIAGE (OUTPATIENT)
Dept: FAMILY MEDICINE CLINIC | Facility: CLINIC | Age: 71
End: 2021-08-02

## 2021-08-02 NOTE — TELEPHONE ENCOUNTER
Action Requested: Summary for Provider     []  Critical Lab, Recommendations Needed  [] Need Additional Advice  []   FYI    []   Need Orders  [] Need Medications Sent to Pharmacy  []  Other     SUMMARY: Patient scheduled for appt tomorrow 11am with Dr Stephanie Daly

## 2021-08-03 ENCOUNTER — OFFICE VISIT (OUTPATIENT)
Dept: FAMILY MEDICINE CLINIC | Facility: CLINIC | Age: 71
End: 2021-08-03
Payer: COMMERCIAL

## 2021-08-03 VITALS
SYSTOLIC BLOOD PRESSURE: 125 MMHG | HEART RATE: 56 BPM | WEIGHT: 257 LBS | TEMPERATURE: 97 F | DIASTOLIC BLOOD PRESSURE: 69 MMHG | BODY MASS INDEX: 33 KG/M2 | RESPIRATION RATE: 18 BRPM

## 2021-08-03 DIAGNOSIS — M54.50 ACUTE LEFT-SIDED LOW BACK PAIN WITHOUT SCIATICA: Primary | ICD-10-CM

## 2021-08-03 PROCEDURE — 3078F DIAST BP <80 MM HG: CPT | Performed by: FAMILY MEDICINE

## 2021-08-03 PROCEDURE — 99213 OFFICE O/P EST LOW 20 MIN: CPT | Performed by: FAMILY MEDICINE

## 2021-08-03 PROCEDURE — 3074F SYST BP LT 130 MM HG: CPT | Performed by: FAMILY MEDICINE

## 2021-08-03 RX ORDER — CYCLOBENZAPRINE HCL 10 MG
10 TABLET ORAL 3 TIMES DAILY PRN
Qty: 30 TABLET | Refills: 0 | Status: SHIPPED | OUTPATIENT
Start: 2021-08-03 | End: 2021-10-16

## 2021-08-03 RX ORDER — ACETAMINOPHEN AND CODEINE PHOSPHATE 300; 30 MG/1; MG/1
1 TABLET ORAL EVERY 4 HOURS PRN
Qty: 50 TABLET | Refills: 0 | Status: SHIPPED | OUTPATIENT
Start: 2021-08-03 | End: 2021-10-04

## 2021-08-03 NOTE — PROGRESS NOTES
HPI: Billy Meadows is a 66year old male who presents for back pain. Pt was moving daughter in bed on Sunday evening and was unable to straighten up. Daughter has MS and is immobile. Took hot shower and tried to ice it and lie down. Very painful.  Iced it on Mon TAKE 1 TABLET(10 MEQ) BY MOUTH DAILY, Disp: 90 tablet, Rfl: 1  LOSARTAN 100 MG Oral Tab, TAKE 1 TABLET(100 MG) BY MOUTH DAILY, Disp: 90 tablet, Rfl: 1  METOPROLOL SUCCINATE  MG Oral Tablet 24 Hr, TAKE 1 AND 1/2 TABLETS(150 MG) BY MOUTH DAILY, Disp: 1 raise.  Normal hip exam    Assessment:/Plan:  Acute left-sided low back pain without sciatica  (primary encounter diagnosis)    Suspect MSK etiology. Advised heat. May continue to use Tylenol #3 as needed. Refilled. Cyclobenzaprine prescribed.   Caution

## 2021-09-03 ENCOUNTER — APPOINTMENT (OUTPATIENT)
Dept: HEMATOLOGY/ONCOLOGY | Facility: HOSPITAL | Age: 71
End: 2021-09-03
Attending: INTERNAL MEDICINE
Payer: MEDICARE

## 2021-09-07 ENCOUNTER — TELEPHONE (OUTPATIENT)
Dept: CARDIOLOGY | Age: 71
End: 2021-09-07

## 2021-09-24 ENCOUNTER — HOSPITAL ENCOUNTER (OUTPATIENT)
Facility: HOSPITAL | Age: 71
Setting detail: OBSERVATION
Discharge: HOME | End: 2021-09-25
Attending: EMERGENCY MEDICINE | Admitting: HOSPITALIST
Payer: MEDICARE

## 2021-09-24 ENCOUNTER — APPOINTMENT (OUTPATIENT)
Dept: GENERAL RADIOLOGY | Facility: HOSPITAL | Age: 71
End: 2021-09-24
Payer: MEDICARE

## 2021-09-24 DIAGNOSIS — R91.1 PULMONARY NODULE: ICD-10-CM

## 2021-09-24 DIAGNOSIS — I20.9 ANGINA PECTORIS, UNSPECIFIED (HCC): Primary | ICD-10-CM

## 2021-09-24 PROCEDURE — 71045 X-RAY EXAM CHEST 1 VIEW: CPT | Performed by: EMERGENCY MEDICINE

## 2021-09-24 PROCEDURE — 99220 INITIAL OBSERVATION CARE,LEVL III: CPT | Performed by: HOSPITALIST

## 2021-09-24 RX ORDER — NITROGLYCERIN 0.4 MG/1
0.4 TABLET SUBLINGUAL EVERY 5 MIN PRN
Status: DISCONTINUED | OUTPATIENT
Start: 2021-09-24 | End: 2021-09-25

## 2021-09-24 RX ORDER — FUROSEMIDE 40 MG/1
40 TABLET ORAL EVERY MORNING
Status: DISCONTINUED | OUTPATIENT
Start: 2021-09-25 | End: 2021-09-25

## 2021-09-24 RX ORDER — HEPARIN SODIUM 5000 [USP'U]/ML
5000 INJECTION, SOLUTION INTRAVENOUS; SUBCUTANEOUS EVERY 12 HOURS SCHEDULED
Status: DISCONTINUED | OUTPATIENT
Start: 2021-09-24 | End: 2021-09-25

## 2021-09-24 RX ORDER — ACETAMINOPHEN 325 MG/1
650 TABLET ORAL EVERY 6 HOURS PRN
Status: DISCONTINUED | OUTPATIENT
Start: 2021-09-24 | End: 2021-09-25

## 2021-09-24 RX ORDER — DIPHENHYDRAMINE HCL 25 MG
25 CAPSULE ORAL EVERY 6 HOURS PRN
Status: DISCONTINUED | OUTPATIENT
Start: 2021-09-24 | End: 2021-09-25

## 2021-09-24 RX ORDER — LOSARTAN POTASSIUM 100 MG/1
100 TABLET ORAL DAILY
Status: DISCONTINUED | OUTPATIENT
Start: 2021-09-25 | End: 2021-09-25

## 2021-09-24 RX ORDER — PANTOPRAZOLE SODIUM 40 MG/1
40 TABLET, DELAYED RELEASE ORAL
Status: DISCONTINUED | OUTPATIENT
Start: 2021-09-25 | End: 2021-09-25

## 2021-09-24 RX ORDER — NITROGLYCERIN 0.4 MG/1
0.4 TABLET SUBLINGUAL EVERY 5 MIN PRN
Status: DISCONTINUED | OUTPATIENT
Start: 2021-09-24 | End: 2021-09-24

## 2021-09-24 RX ORDER — ASPIRIN 325 MG
325 TABLET ORAL DAILY
Status: DISCONTINUED | OUTPATIENT
Start: 2021-09-24 | End: 2021-09-25

## 2021-09-24 RX ORDER — TAMSULOSIN HYDROCHLORIDE 0.4 MG/1
0.4 CAPSULE ORAL DAILY
Status: DISCONTINUED | OUTPATIENT
Start: 2021-09-25 | End: 2021-09-25

## 2021-09-24 RX ORDER — ONDANSETRON 2 MG/ML
4 INJECTION INTRAMUSCULAR; INTRAVENOUS EVERY 6 HOURS PRN
Status: DISCONTINUED | OUTPATIENT
Start: 2021-09-24 | End: 2021-09-25

## 2021-09-24 RX ORDER — ATORVASTATIN CALCIUM 40 MG/1
40 TABLET, FILM COATED ORAL NIGHTLY
Refills: 2 | Status: DISCONTINUED | OUTPATIENT
Start: 2021-09-24 | End: 2021-09-25

## 2021-09-24 RX ORDER — METOCLOPRAMIDE HYDROCHLORIDE 5 MG/ML
10 INJECTION INTRAMUSCULAR; INTRAVENOUS EVERY 8 HOURS PRN
Status: DISCONTINUED | OUTPATIENT
Start: 2021-09-24 | End: 2021-09-25

## 2021-09-24 RX ORDER — GABAPENTIN 300 MG/1
600 CAPSULE ORAL NIGHTLY
Status: DISCONTINUED | OUTPATIENT
Start: 2021-09-24 | End: 2021-09-25

## 2021-09-24 RX ORDER — TEMAZEPAM 15 MG/1
15 CAPSULE ORAL NIGHTLY PRN
Status: DISCONTINUED | OUTPATIENT
Start: 2021-09-24 | End: 2021-09-25

## 2021-09-24 RX ORDER — ASPIRIN 81 MG/1
162 TABLET, CHEWABLE ORAL ONCE
Status: COMPLETED | OUTPATIENT
Start: 2021-09-24 | End: 2021-09-24

## 2021-09-24 RX ORDER — ACETAMINOPHEN AND CODEINE PHOSPHATE 300; 30 MG/1; MG/1
1 TABLET ORAL EVERY 4 HOURS PRN
Status: DISCONTINUED | OUTPATIENT
Start: 2021-09-24 | End: 2021-09-25

## 2021-09-24 RX ORDER — GABAPENTIN 300 MG/1
300 CAPSULE ORAL DAILY
Status: DISCONTINUED | OUTPATIENT
Start: 2021-09-25 | End: 2021-09-25

## 2021-09-24 NOTE — ED INITIAL ASSESSMENT (HPI)
Pt reports chest pain since noon . Pt reports took 4 nitroglycerin tablets. Pt reports relief now of his chest pain.  Pt denies cough or fever

## 2021-09-24 NOTE — ED QUICK NOTES
Orders for admission, patient is aware of plan and ready to go upstairs. Any questions, please call ED RN Nida Moreno  at extension [phone].    Type of COVID test sent: Rapid  COVID Suspicion level: Low    Titratable drug(s) infusing: NA  Rate:    LOC at time o

## 2021-09-24 NOTE — CONSULTS
[facility] NOTE    Santiago Treviño Patient Status:  Emergency    1950 MRN [id number]   Location [address] Attending Valentin Patton MD   Hosp Day # 0 PCP Darcy Ackerman, DO     Date waking today and feeling well that lasted 5 minutes and went away with a nitro. He had 3 other episodes they went away quicker after he took 3 other nitros. In the ER he is pain-free.   Patient has seen me in the office not too long ago and was doing okay Erasmo Perry MD;  Location: [address] [place] [address] ENDOSCOPY   • COLONOSCOPY N/A 2/24/2018    Procedure: COLONOSCOPY;  Surgeon: Haresh Nichols MD;  Location: [address] [place] [address] ENDOSCOPY   • COLONOSCOPY N/A 4/26/2018    Procedure: COLONOSCOPY;  Surgeon: Haresh Nichols MD;  L Systems:   10 pt ROS performed, separate from HPI  Review of Systems:  GENERAL: no fevers, chills, sweats  HEENT: no visual or hearing changes  SKIN: denies any unusual skin lesions or rashes  RESPIRATORY: denies shortness of breath with exertion  CARDIOVA 06/03/2021    AST 14 (L) 06/03/2021    ALT 16 06/03/2021    PTT 33.8 04/29/2021    INR 1.05 04/29/2021    PTP 13.5 04/29/2021    TSH 1.060 05/05/2020    PSA 0.01 10/20/2020    MG 1.7 02/11/2020    B12 441 03/02/2021         Imaging:          XR CHEST AP PO

## 2021-09-24 NOTE — ED PROVIDER NOTES
Patient Seen in: [facility] AND [facility] Emergency Department    History   Patient presents with:  Chest Pain Angina    Stated Complaint: CP    HPI    66year old male presenting with chest pain. Pain began around noon while doing laundry, .  The patient descri Brittny Albrecht MD;  Location: [address] [place] [address] ENDOSCOPY   • COLONOSCOPY N/A 4/26/2018    Procedure: COLONOSCOPY;  Surgeon: Brittny Albrecht MD;  Location: [address] [place] [address] ENDOSCOPY   • CORONARY STENT PLACEMENT  [address] [place] [address] (L[place])   • CYSTO INSERTION TRANSPROSTATIC IMPLANT SI OR),  Take by mouth. Cetirizine HCl (ZYRTEC ALLERGY) 10 MG Oral Cap,  Take by mouth. nitroGLYCERIN 0.4 MG Sublingual SL Tab,  Place 1 tablet (0.4 mg total) under the tongue every 5 (five) minutes as needed for Chest pain.    Ergocalciferol (VITAMIN D2) breath sounds, no respiratory distress, no wheezing, no chest tenderness. GI: Bowel sounds normal, Soft, no tenderness, no masses, no pulsatile masses. : No CVA tenderness. Skin: Warm, dry, no erythema, no rash.   Musculoskeletal: Intact distal pulses, (CPT=71045)    Result Date: 9/24/2021  CONCLUSION:   Mild interstitial edema. 2 cm right lower lobe nodule appears new since prior exams. Non-emergent chest CT recommended to further evaluate.     Dictated by (CST): Carolyn Talamantes MD on 9/24/2021 at 3:57 PM up care with a primary care provider within the next three months to obtain basic health screening including reassessment of your blood pressure.       Medications Prescribed:  Current Discharge Medication List                         Disposition and Plan

## 2021-09-24 NOTE — H&P
[facility]    PATIENT'S NAME: Power Brantley   ATTENDING PHYSICIAN: Jillene Bosworth, MD   PATIENT ACCOUNT#:   [de-identified]    LOCATION:  [place] [address]  MEDICAL RECORD #:   [id number]       YOB: 1950  ADMISSION DATE:       09/24/ today, he felt tightness in the precordial area that lasted around 5 minutes. Then he took nitroglycerin which took away the pain. The patient denies any recent similar incidents. Currently asymptomatic. Other 12-point review of systems is negative.

## 2021-09-25 ENCOUNTER — APPOINTMENT (OUTPATIENT)
Dept: NUCLEAR MEDICINE | Facility: HOSPITAL | Age: 71
End: 2021-09-25
Attending: HOSPITALIST
Payer: MEDICARE

## 2021-09-25 ENCOUNTER — APPOINTMENT (OUTPATIENT)
Dept: CV DIAGNOSTICS | Facility: HOSPITAL | Age: 71
End: 2021-09-25
Attending: HOSPITALIST
Payer: MEDICARE

## 2021-09-25 VITALS
OXYGEN SATURATION: 99 % | RESPIRATION RATE: 18 BRPM | HEIGHT: 74 IN | DIASTOLIC BLOOD PRESSURE: 82 MMHG | BODY MASS INDEX: 32.51 KG/M2 | SYSTOLIC BLOOD PRESSURE: 130 MMHG | HEART RATE: 57 BPM | TEMPERATURE: 98 F | WEIGHT: 253.31 LBS

## 2021-09-25 PROCEDURE — 93017 CV STRESS TEST TRACING ONLY: CPT | Performed by: HOSPITALIST

## 2021-09-25 PROCEDURE — 99217 OBSERVATION CARE DISCHARGE: CPT | Performed by: INTERNAL MEDICINE

## 2021-09-25 PROCEDURE — 78452 HT MUSCLE IMAGE SPECT MULT: CPT | Performed by: HOSPITALIST

## 2021-09-25 RX ORDER — ASPIRIN 81 MG/1
81 TABLET ORAL DAILY
Qty: 30 TABLET | Refills: 0 | Status: SHIPPED | OUTPATIENT
Start: 2021-09-25

## 2021-09-25 RX ORDER — HEPARIN SODIUM (PORCINE) LOCK FLUSH IV SOLN 100 UNIT/ML 100 UNIT/ML
SOLUTION INTRAVENOUS
Status: COMPLETED
Start: 2021-09-25 | End: 2021-09-25

## 2021-09-25 NOTE — CM/SW NOTE
09/25/21 1300   CM/SW Referral Data   Referral Source Physician   Reason for Referral Other  (Advanced Directives)   Informant Patient   Patient Info   Advanced directives?  Yes  (Wife José Miguel Palmer)   Patient's Current Mental Status at Time of Assessme

## 2021-09-25 NOTE — PLAN OF CARE
Patient alert and oriented x4, saturating well on room air. Admitted for chest pain, no complaints of pain now. Plan for a stress test in the morning. Educated to use the call light, within reach. Fall precautions in place.       Problem: Patient Center Absence of cardiac arrhythmias or at baseline  Description: INTERVENTIONS:  - Continuous cardiac monitoring, monitor vital signs, obtain 12 lead EKG if indicated  - Evaluate effectiveness of antiarrhythmic and heart rate control medications as ordered  - I

## 2021-09-25 NOTE — PROGRESS NOTES
[facility] - [facility]     Cardiology Progress Note        Emil Bell Patient Status:  Observation    1950 MRN [id number]   Location [facility] 3W/SW Attending Elkin Walters MD   Hosp Day # 0 PCP Adiel Correa DO 600 mg Oral Nightly         Assessment:    • Chest pain in pt with known cad, hx pci- trops negative. ekg without acute changes.  Nuclear stress test peding  • ICM, ICD ef 40-45%- compensated  • htn- controlled  • Lipids- ldl 34 on current statin   • 2 cm l

## 2021-09-25 NOTE — PLAN OF CARE
Problem: Patient Centered Care  Goal: Patient preferences are identified and integrated in the patient's plan of care  Description: Interventions:  - What would you like us to know as we care for you? 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    - Pr medications as ordered  - Initiate emergency measures for life threatening arrhythmias  - Monitor electrolytes and administer replacement therapy as ordered  Outcome: Progressing   Patient a&o x4. No c/o pain. Consent for stress test signed.  Will continue

## 2021-09-25 NOTE — PLAN OF CARE
Problem: Patient Centered Care  Goal: Patient preferences are identified and integrated in the patient's plan of care  Description: Interventions:  - What would you like us to know as we care for you? 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    - Pr medications as ordered  - Initiate emergency measures for life threatening arrhythmias  - Monitor electrolytes and administer replacement therapy as ordered  Outcome: Completed   Reviewed discharge instructions and follow up appointments with patient.  Skyla

## 2021-09-25 NOTE — DISCHARGE SUMMARY
[facility]    Discharge Summary    Kavin Milligan Patient Status:  Observation    1950 MRN [id number]   Location [facility] 3W/SW Attending Leeroy Maxwell MD   Hosp Day # 0 PCP Jairo Martinez DO     Date of Admiss 98% 98% 97% 99%   Weight: 253 lb 4.8 oz (114.9 kg)      Height: 6' 2\" (1.88 m)        Patient Weight for the past 72 hrs:   Weight   09/24/21 1506 252 lb 8 oz (114.5 kg)   09/24/21 1710 253 lb 4.8 oz (114.9 kg)       Intake/Output Summary (Last 24 hours) Dictated by (CST): Kevin Tobias MD on 9/24/2021 at 3:57 PM     Finalized by (CST): Kevin Tobias MD on 9/24/2021 at 3:59 PM            LABS :     Lab Results   Component Value Date    WBC 4.1 09/25/2021    HGB 9.8 (L) 09/25/2021    HCT 30.4 (L) 09/25/202 Tb24  Commonly known as:  Toprol XL  What changed: how to take this      TAKE 1 AND 1/2 TABLETS(150 MG) BY MOUTH DAILY   Quantity: 135 tablet  Refills: 1     simvastatin 80 MG Tabs  Commonly known as: ZOCOR  What changed:   · how much to take  · how to take Caps  Commonly known as: FLOMAX      Take 1 capsule (0.4 mg total) by mouth daily. Quantity: 90 capsule  Refills: 1     Vitamin D2 10 MCG (400 UNIT) Tabs      Take by mouth.    Refills: 0           Where to Get Your Medications      These medications were

## 2021-09-25 NOTE — CM/SW NOTE
09/25/21 1300   Discharge disposition   Expected discharge disposition Home or Self   Home services after discharge None   Discharge transportation Private car       Pt discussed during nursing rounds. Pt is stable for dc today. MD dc order entered.   No

## 2021-09-27 ENCOUNTER — PATIENT OUTREACH (OUTPATIENT)
Dept: CASE MANAGEMENT | Age: 71
End: 2021-09-27

## 2021-09-27 DIAGNOSIS — I10 ESSENTIAL HYPERTENSION: ICD-10-CM

## 2021-09-27 DIAGNOSIS — J44.9 ASTHMA WITH COPD (CHRONIC OBSTRUCTIVE PULMONARY DISEASE) (HCC): Primary | ICD-10-CM

## 2021-09-27 DIAGNOSIS — Z02.9 ENCOUNTERS FOR UNSPECIFIED ADMINISTRATIVE PURPOSE: ICD-10-CM

## 2021-09-27 DIAGNOSIS — I50.20 SYSTOLIC HEART FAILURE, UNSPECIFIED HF CHRONICITY (HCC): ICD-10-CM

## 2021-09-27 PROCEDURE — 1111F DSCHRG MED/CURRENT MED MERGE: CPT

## 2021-09-27 NOTE — PAYOR COMM NOTE
Discharge Notification    Patient Name: Kilo Asa: [facility] #: [id number]  Authorization Number: [id number]  Admit Date/Time: 9/24/2021 3:08 PM  Discharge Date/Time: 9/25/2021 2:23 PM

## 2021-09-27 NOTE — PROGRESS NOTES
LM for pt to call [facility] for TCM since discharge. [facility] phone number was provided for pt to call back.

## 2021-09-28 NOTE — PROGRESS NOTES
Initial Post Discharge Follow Up   Discharge Date: 9/25/21  Contact Date: 9/28/2021    Consent Verification:  Assessment Completed With: Patient  HIPAA Verified?   Yes    Discharge Dx:      Chest pain, improved with nitroglycerin  Underlying coronary art cap Take 1 capsule (0.4 mg total) by mouth daily.  90 capsule 1   • FUROSEMIDE 40 MG Oral Tab TAKE 1 TABLET(40 MG) BY MOUTH EVERY MORNING 90 tablet 1   • POTASSIUM CHLORIDE ER 10 MEQ Oral Tab CR TAKE 1 TABLET(10 MEQ) BY MOUTH DAILY 90 tablet 1   • LOSARTAN have any questions about your new medication? No  • Did you  your discharge medications when you left the hospital? Yes  • May I go over your medications with you to make sure we are not missing anything? yes  • Are there any reasons that keep you fr COPD symptoms? Dr. Dacia Morris   Do you know when to call with COPD symptoms? Yes   Do you have any of the following potential risk factors for COPD in your home environment?   Primary or secondary tobacco smoke   no  Occupational dusts and chemicals organic a Hematology Oncology  [address] [facility] [place] [place] [address]  [address] [place]   Dede Huber [address]  [phone]          PCP TCM/HFU appointme

## 2021-09-30 NOTE — PROGRESS NOTES
Update: BOZENA did follow up with the patient to confirm a cardiology appointment was scheduled. The patient reported having an appointment today, 9/30/2021, at 11:45. The patient is also scheduled with Dr. David Landaverde on 10/6/2021.  The patient has an appointme

## 2021-10-04 ENCOUNTER — OFFICE VISIT (OUTPATIENT)
Dept: FAMILY MEDICINE CLINIC | Facility: CLINIC | Age: 71
End: 2021-10-04
Payer: COMMERCIAL

## 2021-10-04 VITALS
HEIGHT: 74 IN | BODY MASS INDEX: 32.98 KG/M2 | WEIGHT: 257 LBS | SYSTOLIC BLOOD PRESSURE: 114 MMHG | DIASTOLIC BLOOD PRESSURE: 64 MMHG | HEART RATE: 56 BPM

## 2021-10-04 DIAGNOSIS — Z09 HOSPITAL DISCHARGE FOLLOW-UP: Primary | ICD-10-CM

## 2021-10-04 DIAGNOSIS — N40.0 BENIGN PROSTATIC HYPERPLASIA, UNSPECIFIED WHETHER LOWER URINARY TRACT SYMPTOMS PRESENT: ICD-10-CM

## 2021-10-04 DIAGNOSIS — R07.9 CHEST PAIN, UNSPECIFIED TYPE: ICD-10-CM

## 2021-10-04 DIAGNOSIS — C43.70 MALIGNANT MELANOMA OF LOWER EXTREMITY, INCLUDING HIP, UNSPECIFIED LATERALITY (HCC): ICD-10-CM

## 2021-10-04 DIAGNOSIS — Z23 FLU VACCINE NEED: ICD-10-CM

## 2021-10-04 PROCEDURE — 3078F DIAST BP <80 MM HG: CPT | Performed by: FAMILY MEDICINE

## 2021-10-04 PROCEDURE — 90662 IIV NO PRSV INCREASED AG IM: CPT | Performed by: FAMILY MEDICINE

## 2021-10-04 PROCEDURE — 3074F SYST BP LT 130 MM HG: CPT | Performed by: FAMILY MEDICINE

## 2021-10-04 PROCEDURE — 99495 TRANSJ CARE MGMT MOD F2F 14D: CPT | Performed by: FAMILY MEDICINE

## 2021-10-04 PROCEDURE — 3008F BODY MASS INDEX DOCD: CPT | Performed by: FAMILY MEDICINE

## 2021-10-04 PROCEDURE — G0008 ADMIN INFLUENZA VIRUS VAC: HCPCS | Performed by: FAMILY MEDICINE

## 2021-10-04 RX ORDER — NITROGLYCERIN 0.4 MG/1
0.4 TABLET SUBLINGUAL EVERY 5 MIN PRN
Qty: 10 TABLET | Refills: 1 | Status: SHIPPED | OUTPATIENT
Start: 2021-10-04

## 2021-10-04 RX ORDER — TAMSULOSIN HYDROCHLORIDE 0.4 MG/1
0.4 CAPSULE ORAL DAILY
Qty: 90 CAPSULE | Refills: 3 | Status: SHIPPED | OUTPATIENT
Start: 2021-10-04 | End: 2021-10-06

## 2021-10-04 RX ORDER — ACETAMINOPHEN AND CODEINE PHOSPHATE 300; 30 MG/1; MG/1
1 TABLET ORAL EVERY 4 HOURS PRN
Qty: 50 TABLET | Refills: 0 | Status: SHIPPED | OUTPATIENT
Start: 2021-10-04 | End: 2022-01-05

## 2021-10-04 NOTE — PATIENT INSTRUCTIONS
Medication reviewed and renewed where needed and appropriate. Monitor blood pressures and record at home. Limit salt intake. Comply with medications. Recommend weight loss via daily exercising and consistent healthy dietary changes.

## 2021-10-04 NOTE — PROGRESS NOTES
HPI:    Bishop Hunt is a 66year old male here today for hospital follow up.    He was discharged from Inpatient hospital, [facility] AND [facility]  to Home   Admission Date: 9/24/21   Discharge Date: 9/25/21  Hospital Discharge Diagnoses (since 9/4/2021)   N interview, patient denied any further chest pain. Reported he was feeling well. Patient otherwise denied shortness of breath, abdominal pain, nausea vomiting fevers or chills. Currently patient is symptom free    Allergies:  He has No Known Allergies. (1956), Calculus of kidney, Colon polyp (2018), Coronary atherosclerosis, [place], [facility] (2014), Diverticular disease (1992), Esophageal reflux, Exposure to medical diagnostic radiation, Gastroenteritis (2015), Gastrointestinal bleeding ( motion of extremities  NEURO: denies headaches, denies dizziness, denies weakness  PSYCHE: denies depression or anxiety  HEMATOLOGIC: denies hx of anemia, or bruising, denies bleeding  ENDOCRINE: denies thyroid history  ALL/ASTHMA: denies hx of allergy or Fluzone High Dose 65 yr and older PFS [[address]106]      Meds & Refills for this Visit:  Requested Prescriptions     Signed Prescriptions Disp Refills   • acetaminophen-codeine 300-30 MG Oral Tab 50 tablet 0     Sig: Take 1 tablet by mouth every 4 (four) hours a

## 2021-10-06 ENCOUNTER — LAB ENCOUNTER (OUTPATIENT)
Dept: LAB | Facility: HOSPITAL | Age: 71
End: 2021-10-06
Attending: UROLOGY
Payer: MEDICARE

## 2021-10-06 ENCOUNTER — OFFICE VISIT (OUTPATIENT)
Dept: SURGERY | Facility: CLINIC | Age: 71
End: 2021-10-06
Payer: COMMERCIAL

## 2021-10-06 VITALS
DIASTOLIC BLOOD PRESSURE: 73 MMHG | SYSTOLIC BLOOD PRESSURE: 117 MMHG | WEIGHT: 257 LBS | HEART RATE: 67 BPM | BODY MASS INDEX: 33 KG/M2

## 2021-10-06 DIAGNOSIS — N13.8 BPH WITH OBSTRUCTION/LOWER URINARY TRACT SYMPTOMS: ICD-10-CM

## 2021-10-06 DIAGNOSIS — N40.1 BPH WITH OBSTRUCTION/LOWER URINARY TRACT SYMPTOMS: ICD-10-CM

## 2021-10-06 DIAGNOSIS — C61 PROSTATE CANCER (HCC): Primary | ICD-10-CM

## 2021-10-06 DIAGNOSIS — N39.3 STRESS INCONTINENCE OF URINE: ICD-10-CM

## 2021-10-06 PROBLEM — R33.9 URINARY RETENTION: Status: RESOLVED | Noted: 2020-01-01 | Resolved: 2021-10-06

## 2021-10-06 PROBLEM — R33.9 URINARY RETENTION: Status: RESOLVED | Noted: 2020-08-17 | Resolved: 2021-10-06

## 2021-10-06 PROCEDURE — 96402 CHEMO HORMON ANTINEOPL SQ/IM: CPT | Performed by: UROLOGY

## 2021-10-06 PROCEDURE — 99213 OFFICE O/P EST LOW 20 MIN: CPT | Performed by: UROLOGY

## 2021-10-06 PROCEDURE — 36415 COLL VENOUS BLD VENIPUNCTURE: CPT | Performed by: UROLOGY

## 2021-10-06 PROCEDURE — 84153 ASSAY OF PSA TOTAL: CPT | Performed by: UROLOGY

## 2021-10-06 PROCEDURE — 3074F SYST BP LT 130 MM HG: CPT | Performed by: UROLOGY

## 2021-10-06 PROCEDURE — 3078F DIAST BP <80 MM HG: CPT | Performed by: UROLOGY

## 2021-10-06 PROCEDURE — 81003 URINALYSIS AUTO W/O SCOPE: CPT | Performed by: UROLOGY

## 2021-10-06 NOTE — PROGRESS NOTES
[facility], [facility] Urology  Follow-Up Visit    HPI: Avila Grimm is a 66year old male presents for a follow up visit. Patient was last seen on 6/2/2021. He is here by himself. INTERVAL HISTORY: status post UroLift 5/7/2021. Improved voiding symptoms. Patient decided on definitive radiation + ADT. Started bicalutamide in preparation for ADT initiation. - 10/23/19 F/U: Lupron 45 mg SubQ 6 months depot.  Plan for EBRT + brachy boost with Dr. Kvng Ortega.    - 12/2019 F/U: Episode of right epididymoorch to 2-3 times daily down from 5-7 times. Voiding in between. PVR after CIC between 150 and 225 mL. Vesicare stopped. - 10/2021 F/U: Administered Lupron 45 mg 6-month depot. PSA pending. Stopped CIC. PVR 0 mL.  + stress urinary incontinence (5-6 pads) Bilateral epididymal cysts/spermatoceles. No testicular mass lesion or testicular torsion. NM Bone Scan (9/16/2019): Whole body planar bone scan demonstrates no evidence of osseous metastatic disease.   There are degenerative changes in the shoulders,

## 2021-10-08 ENCOUNTER — TELEPHONE (OUTPATIENT)
Dept: SURGERY | Facility: CLINIC | Age: 71
End: 2021-10-08

## 2021-10-08 NOTE — TELEPHONE ENCOUNTER
Called pt, verified name and . Let him know that Eligard injection was approved through his insurance today, so we can schedule him next week for nurse visit.  Pt scheduled for N/V Monday 10/11/21 at 2pm.

## 2021-10-08 NOTE — TELEPHONE ENCOUNTER
Spoke with Stephanie KEARNEY At [facility] for prior [facility]. Will fax over clinical questions required for prior authorization referral. Once questions have been answered, to call back at [phone] with option #2. PA Draft ID # [id number].

## 2021-10-08 NOTE — TELEPHONE ENCOUNTER
Called [employer] back with clinical answers, prior authorization was approved for patient for leprolide injection 45 mg (Code )  starting 10/8/2021 through 10/8/2022. Authorization # [id number].

## 2021-10-11 ENCOUNTER — NURSE ONLY (OUTPATIENT)
Dept: SURGERY | Facility: CLINIC | Age: 71
End: 2021-10-11
Payer: COMMERCIAL

## 2021-10-11 DIAGNOSIS — C61 PROSTATE CANCER (HCC): Primary | ICD-10-CM

## 2021-10-11 PROCEDURE — 96402 CHEMO HORMON ANTINEOPL SQ/IM: CPT | Performed by: UROLOGY

## 2021-10-11 NOTE — PROGRESS NOTES
Pt had to return today for a n/v for his 6 mo Eligard 45 mg inj because we had to obtain PA for it which we did. I went to the waiting room and told pt that the med needs to warm for 20 min and that I will come and get him when the timer goes off.  I came b

## 2021-10-12 ENCOUNTER — TELEPHONE (OUTPATIENT)
Dept: PHYSICAL THERAPY | Facility: HOSPITAL | Age: 71
End: 2021-10-12

## 2021-10-16 RX ORDER — CYCLOBENZAPRINE HCL 10 MG
10 TABLET ORAL 3 TIMES DAILY PRN
Qty: 30 TABLET | Refills: 1 | Status: SHIPPED | OUTPATIENT
Start: 2021-10-16

## 2021-10-16 NOTE — TELEPHONE ENCOUNTER
Please review refill protocol failed/ no protocol  Requested Prescriptions   Pending Prescriptions Disp Refills    CYCLOBENZAPRINE 10 MG Oral Tab [Pharmacy Med Name: CYCLOBENZAPRINE 10MG TABLETS] 30 tablet 0     Sig: TAKE 1 TABLET(10 MG) BY MOUTH THREE NATALIE

## 2021-10-21 ENCOUNTER — OFFICE VISIT (OUTPATIENT)
Dept: PHYSICAL THERAPY | Age: 71
End: 2021-10-21
Attending: UROLOGY
Payer: MEDICARE

## 2021-10-21 DIAGNOSIS — N39.3 STRESS INCONTINENCE OF URINE: ICD-10-CM

## 2021-10-21 DIAGNOSIS — K21.9 GASTROESOPHAGEAL REFLUX DISEASE: ICD-10-CM

## 2021-10-21 DIAGNOSIS — I10 ESSENTIAL HYPERTENSION: ICD-10-CM

## 2021-10-21 PROCEDURE — 97112 NEUROMUSCULAR REEDUCATION: CPT

## 2021-10-21 PROCEDURE — 97162 PT EVAL MOD COMPLEX 30 MIN: CPT

## 2021-10-21 PROCEDURE — 97110 THERAPEUTIC EXERCISES: CPT

## 2021-10-21 RX ORDER — PANTOPRAZOLE SODIUM 40 MG/1
TABLET, DELAYED RELEASE ORAL
Qty: 90 TABLET | Refills: 3 | Status: SHIPPED | OUTPATIENT
Start: 2021-10-21

## 2021-10-21 RX ORDER — POTASSIUM CHLORIDE 750 MG/1
TABLET, EXTENDED RELEASE ORAL
Qty: 90 TABLET | Refills: 3 | Status: SHIPPED | OUTPATIENT
Start: 2021-10-21

## 2021-10-21 RX ORDER — LOSARTAN POTASSIUM 100 MG/1
TABLET ORAL
Qty: 90 TABLET | Refills: 3 | Status: SHIPPED | OUTPATIENT
Start: 2021-10-21

## 2021-10-21 RX ORDER — METOPROLOL SUCCINATE 100 MG/1
150 TABLET, EXTENDED RELEASE ORAL DAILY
Qty: 135 TABLET | Refills: 3 | Status: SHIPPED | OUTPATIENT
Start: 2021-10-21

## 2021-10-21 RX ORDER — FUROSEMIDE 40 MG/1
TABLET ORAL
Qty: 90 TABLET | Refills: 3 | Status: SHIPPED | OUTPATIENT
Start: 2021-10-21

## 2021-10-21 NOTE — PROGRESS NOTES
PELVIC FLOOR EVALUATION:   Referring Physician: Dr. Will Dahl  Diagnosis: Stress incontinence of urine (N39.3)     Date of onset: 2019 Date of Service: 10/21/2021     PATIENT Abrahan Holden is a 66year old male  who presents to therapy today radiation     25 cycles   • Gastroenteritis 2015   • Gastrointestinal bleeding 2014   • Heart attack ([address] 2004   • High blood pressure    • High cholesterol    • History of blood transfusion 2014    [facility]   • Melanoma [facility]) 11/29/2018    Left Plant objective tests and measures show decreased strength and endurance in PF, decreased awareness of PF/bladder, poor hydration habits, and decreased core strength .   Functional deficits include but are not limited to leakage of urine, increased urgency/freque incontinence strategies and diaphragmatic breathing for PNS activation and pelvic floor relaxation     Patient was instructed in and issued a HEP for:     Charges: PT Eval Moderate Complexity, 1TE, 1NM      Total Timed Treatment: 30 min     Total Treatment questions, please contact me at Dept: [phone]    Sincerely,  Electronically signed by therapist: Jacqui Phoenix, PT  [de-identified] certification required: Yes  I certify the need for these services furnished under this plan of treatment and while under

## 2021-10-21 NOTE — TELEPHONE ENCOUNTER
Refill passed per [place] [facility] [place] protocol, but requesting one year supply--unable to send that quantity per protocol    Please advise on potassium refill--no protocol    Requested Prescriptions   Pending Prescriptions Disp Refills    LOSARTAN 100 MG Oral months        Passed - Appointment in past 6 or next 3 months        Passed - GFR  > 50     Lab Results   Component Value Date    GFRAA 91 09/25/2021                         Recent Outpatient Visits              Today Stress incontinence of [address]  c/p $40, no auth, no limit    In 2 months Kelsy Drummond, PT [address]  c/p $98, no auth, no limit

## 2021-10-25 ENCOUNTER — OFFICE VISIT (OUTPATIENT)
Dept: PHYSICAL THERAPY | Age: 71
End: 2021-10-25
Attending: UROLOGY
Payer: MEDICARE

## 2021-10-25 PROCEDURE — 97110 THERAPEUTIC EXERCISES: CPT

## 2021-10-25 PROCEDURE — 97112 NEUROMUSCULAR REEDUCATION: CPT

## 2021-10-25 NOTE — PROGRESS NOTES
Dx: Stress incontinence of urine (N39.3)    Insurance (Authorized # of Visits):  [facility] 10 per POC       Authorizing Physician: Dr. Stephan Arango  Next MD visit: none scheduled  Fall Risk: standard         Precautions: n/a           Subjective: Doing breathing exe

## 2021-11-04 ENCOUNTER — OFFICE VISIT (OUTPATIENT)
Dept: PHYSICAL THERAPY | Age: 71
End: 2021-11-04
Attending: UROLOGY
Payer: MEDICARE

## 2021-11-04 PROCEDURE — 97112 NEUROMUSCULAR REEDUCATION: CPT

## 2021-11-04 PROCEDURE — 97110 THERAPEUTIC EXERCISES: CPT

## 2021-11-04 NOTE — PROGRESS NOTES
Dx: Stress incontinence of urine (N39.3)    Insurance (Authorized # of Visits):  [facility] 10 per POC       Authorizing Physician: Dr. Houston Ramirez  Next MD visit: none scheduled  Fall Risk: standard         Precautions: n/a           Subjective: urgency and leakage breathing with PF 10x  PF level 2 10x  Seated PF with march 10x  Seated PF 5 sec 10x with exhale  Sit to stand with exhale/PF coordination 10x        Self care Home management:       HEP: PF level 2, desens of water    Charges: 2NM, 1TE       Total Timed T

## 2021-11-11 ENCOUNTER — APPOINTMENT (OUTPATIENT)
Dept: PHYSICAL THERAPY | Age: 71
End: 2021-11-11
Attending: UROLOGY
Payer: MEDICARE

## 2021-11-12 ENCOUNTER — NURSE TRIAGE (OUTPATIENT)
Dept: FAMILY MEDICINE CLINIC | Facility: CLINIC | Age: 71
End: 2021-11-12

## 2021-11-12 ENCOUNTER — HOSPITAL ENCOUNTER (OUTPATIENT)
Age: 71
Discharge: HOME | End: 2021-11-12
Payer: MEDICARE

## 2021-11-12 VITALS
OXYGEN SATURATION: 100 % | RESPIRATION RATE: 18 BRPM | WEIGHT: 245 LBS | HEIGHT: 74 IN | HEART RATE: 56 BPM | BODY MASS INDEX: 31.44 KG/M2 | TEMPERATURE: 97 F | DIASTOLIC BLOOD PRESSURE: 69 MMHG | SYSTOLIC BLOOD PRESSURE: 125 MMHG

## 2021-11-12 DIAGNOSIS — N30.00 ACUTE CYSTITIS WITHOUT HEMATURIA: Primary | ICD-10-CM

## 2021-11-12 PROCEDURE — 99213 OFFICE O/P EST LOW 20 MIN: CPT | Performed by: PHYSICIAN ASSISTANT

## 2021-11-12 PROCEDURE — 81002 URINALYSIS NONAUTO W/O SCOPE: CPT | Performed by: PHYSICIAN ASSISTANT

## 2021-11-12 RX ORDER — SULFAMETHOXAZOLE AND TRIMETHOPRIM 800; 160 MG/1; MG/1
1 TABLET ORAL 2 TIMES DAILY
Qty: 14 TABLET | Refills: 0 | Status: SHIPPED | OUTPATIENT
Start: 2021-11-12 | End: 2021-11-19

## 2021-11-12 NOTE — TELEPHONE ENCOUNTER
Please triage. [employer] message sent.     ----- Message from [address] [place] [address]. Ciera sent at 11/11/2021  9:41 PM CST -----  Regarding: UTI  Good evening Dr. Hermila Doran I am experiencing  burning sensation in the penis.  I took two AZO tablets at 9:30 pm. Please advi

## 2021-11-12 NOTE — TELEPHONE ENCOUNTER
Noted pt went to I/C in [facility]. Pt stated that he found a ride so he went o I/C. Pt was given abx.

## 2021-11-12 NOTE — TELEPHONE ENCOUNTER
please see opt [employer] message below. Pt did call and he stated that he was having a burning sensation yesterday night when he would urinate but he took 2 AZO tablets and this morning the burning sensation is not there anymore.  Pt urine is cloud

## 2021-11-12 NOTE — ED PROVIDER NOTES
Patient Seen in: [facility]      History   Patient presents with:  Urinary Symptoms    Stated Complaint: uti    Subjective:   HPI    64-year-old male here for evaluation of dysuria, urgency. Patient states symptoms x2 days.   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 Nnamdi Vogel.    • EXC SKIN MALIG >4CM TRUNK,ARM,LEG Left 03/08/2019    heel melanoma   • LITHOTRIPSY  1974 General: Skin is warm. Neurological:      General: No focal deficit present. Mental Status: He is alert and oriented to person, place, and time.    Psychiatric:         Mood and Affect: Mood normal.         Behavior: Behavior normal.              ED

## 2021-11-17 ENCOUNTER — OFFICE VISIT (OUTPATIENT)
Dept: PHYSICAL THERAPY | Age: 71
End: 2021-11-17
Attending: UROLOGY
Payer: MEDICARE

## 2021-11-17 PROCEDURE — 97110 THERAPEUTIC EXERCISES: CPT

## 2021-11-17 PROCEDURE — 97112 NEUROMUSCULAR REEDUCATION: CPT

## 2021-11-17 NOTE — PROGRESS NOTES
Dx: Stress incontinence of urine (N39.3)    Insurance (Authorized # of Visits):  [facility] 10 per POC       Authorizing Physician: Dr. Sherryle Poot  Next MD visit: none scheduled  Fall Risk: standard         Precautions: n/a           Subjective: Started getting staci TherEx:  Clams 20x   Reverse clams 20 TherEx:  Clams 20x   Reverse clams 20     Manual:       NeuroMuscular Blaise  Diaphragmatic breathing 20x  Diaphragmatic breathing with PF 10x  PF 3 sec 10x  Seated PF with march 10x NeuroMuscular Blaise  Diaphragmatic daryn

## 2021-11-24 ENCOUNTER — OFFICE VISIT (OUTPATIENT)
Dept: PHYSICAL THERAPY | Age: 71
End: 2021-11-24
Attending: UROLOGY
Payer: MEDICARE

## 2021-11-24 PROCEDURE — 97110 THERAPEUTIC EXERCISES: CPT

## 2021-11-24 PROCEDURE — 97112 NEUROMUSCULAR REEDUCATION: CPT

## 2021-11-24 NOTE — PROGRESS NOTES
Dx: Stress incontinence of urine (N39.3)    Insurance (Authorized # of Visits):  [facility] 10 per POC       Authorizing Physician: Dr. Dania Kearns  Next MD visit: none scheduled  Fall Risk: standard         Precautions: n/a           Subjective: Was able to hold ur breathing with PF 10x  PF level 2 10x  Seated PF with march 10x  Seated PF 5 sec 10x with exhale  Sit to stand with exhale/PF coordination 10x NeuroMuscular Blaise  Diaphragmatic breathing with PF 20x  PF level 2 10x  Seated PF with march 10x  Seated PF 5 se

## 2021-12-02 ENCOUNTER — OFFICE VISIT (OUTPATIENT)
Dept: PHYSICAL THERAPY | Age: 71
End: 2021-12-02
Attending: UROLOGY
Payer: MEDICARE

## 2021-12-02 PROCEDURE — 97112 NEUROMUSCULAR REEDUCATION: CPT

## 2021-12-02 PROCEDURE — 97110 THERAPEUTIC EXERCISES: CPT

## 2021-12-02 NOTE — PROGRESS NOTES
Dx: Stress incontinence of urine (N39.3)    Insurance (Authorized # of Visits):  [facility] 10 per POC       Authorizing Physician: Dr. Renay Arroyo  Next MD visit: none scheduled  Fall Risk: standard         Precautions: n/a           Subjective: Doing exercises whe stand with exhale/PF coordination 10x NeuroMuscular Blaise  Diaphragmatic breathing with PF 20x  PF level 2 10x  Seated PF with march 10x  Seated PF 5 sec 10x with exhale  Seated PF 5 sec 10x with exhale  Sit to stand with exhale/PF coordination 10x NeuroMus

## 2021-12-03 ENCOUNTER — NURSE ONLY (OUTPATIENT)
Dept: HEMATOLOGY/ONCOLOGY | Facility: HOSPITAL | Age: 71
End: 2021-12-03
Attending: INTERNAL MEDICINE
Payer: MEDICARE

## 2021-12-03 VITALS
SYSTOLIC BLOOD PRESSURE: 118 MMHG | HEIGHT: 74 IN | DIASTOLIC BLOOD PRESSURE: 63 MMHG | WEIGHT: 257 LBS | OXYGEN SATURATION: 99 % | TEMPERATURE: 98 F | BODY MASS INDEX: 32.98 KG/M2 | RESPIRATION RATE: 16 BRPM | HEART RATE: 57 BPM

## 2021-12-03 DIAGNOSIS — C61 PROSTATE CANCER (HCC): ICD-10-CM

## 2021-12-03 DIAGNOSIS — Z85.820 ENCOUNTER FOR FOLLOW-UP SURVEILLANCE OF MELANOMA: ICD-10-CM

## 2021-12-03 DIAGNOSIS — Z85.820 ENCOUNTER FOR FOLLOW-UP SURVEILLANCE OF MELANOMA: Primary | ICD-10-CM

## 2021-12-03 DIAGNOSIS — D50.8 OTHER IRON DEFICIENCY ANEMIA: Primary | ICD-10-CM

## 2021-12-03 DIAGNOSIS — Z08 ENCOUNTER FOR FOLLOW-UP SURVEILLANCE OF MELANOMA: ICD-10-CM

## 2021-12-03 DIAGNOSIS — D50.8 IRON DEFICIENCY ANEMIA DUE TO DIETARY CAUSES: ICD-10-CM

## 2021-12-03 DIAGNOSIS — D50.8 IRON DEFICIENCY ANEMIA DUE TO DIETARY CAUSES: Primary | ICD-10-CM

## 2021-12-03 DIAGNOSIS — Z08 ENCOUNTER FOR FOLLOW-UP SURVEILLANCE OF MELANOMA: Primary | ICD-10-CM

## 2021-12-03 PROCEDURE — 82746 ASSAY OF FOLIC ACID SERUM: CPT

## 2021-12-03 PROCEDURE — 36591 DRAW BLOOD OFF VENOUS DEVICE: CPT

## 2021-12-03 PROCEDURE — 85045 AUTOMATED RETICULOCYTE COUNT: CPT

## 2021-12-03 PROCEDURE — 99214 OFFICE O/P EST MOD 30 MIN: CPT | Performed by: INTERNAL MEDICINE

## 2021-12-03 PROCEDURE — 84153 ASSAY OF PSA TOTAL: CPT

## 2021-12-03 PROCEDURE — 82607 VITAMIN B-12: CPT

## 2021-12-03 PROCEDURE — 80053 COMPREHEN METABOLIC PANEL: CPT

## 2021-12-03 PROCEDURE — 83540 ASSAY OF IRON: CPT

## 2021-12-03 PROCEDURE — 85025 COMPLETE CBC W/AUTO DIFF WBC: CPT

## 2021-12-03 PROCEDURE — 82728 ASSAY OF FERRITIN: CPT

## 2021-12-03 PROCEDURE — 84466 ASSAY OF TRANSFERRIN: CPT

## 2021-12-03 RX ORDER — SODIUM CHLORIDE 9 MG/ML
INJECTION INTRAVENOUS
Status: DISCONTINUED
Start: 2021-12-03 | End: 2021-12-03

## 2021-12-03 RX ORDER — HEPARIN SODIUM (PORCINE) LOCK FLUSH IV SOLN 100 UNIT/ML 100 UNIT/ML
5 SOLUTION INTRAVENOUS ONCE
Status: COMPLETED | OUTPATIENT
Start: 2021-12-03 | End: 2021-12-03

## 2021-12-03 RX ORDER — HEPARIN SODIUM (PORCINE) LOCK FLUSH IV SOLN 100 UNIT/ML 100 UNIT/ML
5 SOLUTION INTRAVENOUS ONCE
OUTPATIENT
Start: 2021-12-03

## 2021-12-03 RX ORDER — 0.9 % SODIUM CHLORIDE 0.9 %
10 VIAL (ML) INJECTION ONCE
OUTPATIENT
Start: 2021-12-03

## 2021-12-03 RX ORDER — 0.9 % SODIUM CHLORIDE 0.9 %
10 VIAL (ML) INJECTION ONCE
Status: DISCONTINUED | OUTPATIENT
Start: 2021-12-03 | End: 2021-12-03

## 2021-12-03 RX ADMIN — HEPARIN SODIUM (PORCINE) LOCK FLUSH IV SOLN 100 UNIT/ML 500 UNITS: 100 SOLUTION INTRAVENOUS at 07:50:00

## 2021-12-03 NOTE — PROGRESS NOTES
Cancer Center Progress Note    Patient Name: Lonnie Gotti   YOB: 1950   Medical Record Number: [id number]   CSN: [id number]  Consulting Physician: Clay Pedraza MD  Referring Physician(s): Vi Anderson  Date of Visit: 12/03/2021 closure with split- thickness skin graft from right thigh by plastic surgeon, Dr. Kamla Savage. Reid Begum presents at the time of consultation alone.   He reports being in his usual state of health over the last few months prior to diagnosis or curre He is no longer having to do self catheterization based on his incomplete bladder emptying feature, so no longer bleeding from that source.      Magen Dobson denies blood loss from any orifice, but informs me today that he has been a strict vegetarian Location: [address] [place] [address] ENDOSCOPY   • COLONOSCOPY N/A 4/26/2018    Procedure: COLONOSCOPY;  Surgeon: Dayana Price MD;  Location: [address] [place] [address] ENDOSCOPY   • CORONARY STENT PLACEMENT  2004 [address] IMPLANT SINGLE  05/07/2021    ur Hx of Spending [place] of Time in [place]: Not Asked        Bad sunburns in the past: Not Asked        Tanning Salons in the Past: Not Asked        Hx of Radiation Treatments: Not Asked        Regular use of sun block: Not Asked         Service: Not mg total) by mouth daily. , Disp: 135 tablet, Rfl: 3  cyclobenzaprine 10 MG Oral Tab, Take 1 tablet (10 mg total) by mouth 3 (three) times daily as needed for Muscle spasms. , Disp: 30 tablet, Rfl: 1  acetaminophen-codeine 300-30 MG Oral Tab, Take 1 tablet b buttock mole  Hematologic/lymphatic: Negative for easy bruising, bleeding, and lymphadenopathy. Musculoskeletal: Negative for myalgias, arthralgias, muscle weakness.   Neurological: Negative for headaches, dizziness, speech problems, gait problems and weak 07:42 AM       Lab Results   Component Value Date/Time    GLU 98 09/25/2021 06:21 AM    BUN 17 09/25/2021 06:21 AM    CREATSERUM 0.95 09/25/2021 06:21 AM    GFRNAA 79 09/25/2021 06:21 AM    CA 9.2 09/25/2021 06:21 AM    ALB 3.2 (L) 06/03/2021 08:24 AM    N open wound at left heel. Discussed with Dr. Eric Ford and he's been looked at again and may need another skin closure procedure; will see her in 1 week to reassess if the wound has healed.     --Patient's tumor is negative for BRAF-V600 mutation, so will PET/CT scan on 10/28/2020 showing no evidence of malignancy. I would like to repeat a PET/CT scan for him now. --pt has clinical M0 disease via PET/CT scan.  We have reviewed his CT brain with and without contrast shows no evidence of any intracranial m Left foot neuropathy    --pt was seen by Dr. Olvin Monaco in [facility] with improved symptoms on gabapentin 300 mg qAM+600 mg qHS; and also has a tylenol #3, recently refilled     5.) Normocytic anemia with vegetarian diet for the last 6 yrs    --consistent with iron d

## 2021-12-13 ENCOUNTER — OFFICE VISIT (OUTPATIENT)
Dept: PHYSICAL THERAPY | Age: 71
End: 2021-12-13
Attending: UROLOGY
Payer: MEDICARE

## 2021-12-13 PROCEDURE — 97112 NEUROMUSCULAR REEDUCATION: CPT

## 2021-12-13 PROCEDURE — 97110 THERAPEUTIC EXERCISES: CPT

## 2021-12-13 NOTE — PROGRESS NOTES
Dx: Stress incontinence of urine (N39.3)    Insurance (Authorized # of Visits):  [facility] [place] [facility] 10 per POC       Authorizing Physician: Dr. Gracia Dill  Next MD visit: none scheduled  Fall Risk: standard         Precautions: n/a           Subjective: Haven't been doing PF 20x  PF level 2 10x  Seated PF with march 10x  Seated PF 5 sec 10x with exhale  Seated PF 5 sec 10x with exhale  Sit to stand with exhale/PF coordination 10x  Squat with PF/exhale 10x NeuroMuscular Blaise  Diaphragmatic breathing with PF 20x  PF level 2 1

## 2021-12-23 ENCOUNTER — OFFICE VISIT (OUTPATIENT)
Dept: PHYSICAL THERAPY | Age: 71
End: 2021-12-23
Attending: UROLOGY
Payer: MEDICARE

## 2021-12-23 PROCEDURE — 97112 NEUROMUSCULAR REEDUCATION: CPT

## 2021-12-23 PROCEDURE — 97110 THERAPEUTIC EXERCISES: CPT

## 2021-12-23 NOTE — PROGRESS NOTES
Dx: Stress incontinence of urine (N39.3)    Insurance (Authorized # of Visits):  [facility] 10 per POC       Authorizing Physician: Dr. Unique Mair  Next MD visit: none scheduled  Fall Risk: standard         Precautions: n/a           Subjective: Doing more exercise PF 5 sec 10x with exhale  Sit to stand with exhale/PF coordination 10x  Squat with PF/exhale 10x NeuroMuscular Blaise  Diaphragmatic breathing with PF 20x  PF level 2 10x  Seated PF with march 10x  Seated PF 5 sec 10x with exhale  Seated PF 5 sec 10x with ex

## 2021-12-27 ENCOUNTER — APPOINTMENT (OUTPATIENT)
Dept: PHYSICAL THERAPY | Age: 71
End: 2021-12-27
Attending: UROLOGY
Payer: MEDICARE

## 2021-12-29 DIAGNOSIS — E78.2 MIXED HYPERLIPIDEMIA: ICD-10-CM

## 2021-12-29 RX ORDER — SIMVASTATIN 80 MG
80 TABLET ORAL NIGHTLY
Qty: 90 TABLET | Refills: 1 | Status: SHIPPED | OUTPATIENT
Start: 2021-12-29

## 2022-01-01 ENCOUNTER — HOSPITAL ENCOUNTER (EMERGENCY)
Age: 72
End: 2022-10-10
Attending: EMERGENCY MEDICINE

## 2022-01-01 VITALS — WEIGHT: 247.36 LBS

## 2022-01-01 DIAGNOSIS — I46.9 CARDIOPULMONARY ARREST (CMD): Primary | ICD-10-CM

## 2022-01-01 LAB
FLUAV RNA RESP QL NAA+PROBE: NOT DETECTED
FLUBV RNA RESP QL NAA+PROBE: NOT DETECTED
RSV AG NPH QL IA.RAPID: NOT DETECTED
SARS-COV-2 RNA RESP QL NAA+PROBE: NOT DETECTED
SERVICE CMNT-IMP: NORMAL
SERVICE CMNT-IMP: NORMAL

## 2022-01-01 PROCEDURE — C9803 HOPD COVID-19 SPEC COLLECT: HCPCS

## 2022-01-01 PROCEDURE — 99285 EMERGENCY DEPT VISIT HI MDM: CPT

## 2022-01-01 PROCEDURE — 10002800 HB RX 250 W HCPCS: Performed by: EMERGENCY MEDICINE

## 2022-01-01 PROCEDURE — 0241U COVID/FLU/RSV PANEL: CPT | Performed by: EMERGENCY MEDICINE

## 2022-01-01 PROCEDURE — 92950 HEART/LUNG RESUSCITATION CPR: CPT

## 2022-01-01 PROCEDURE — 10004180 HB COUNTER-TRANSPORT

## 2022-01-01 RX ADMIN — EPINEPHRINE 1 MG: 0.1 INJECTION, SOLUTION INTRAVENOUS at 20:32

## 2022-01-01 RX ADMIN — EPINEPHRINE 1 MG: 0.1 INJECTION, SOLUTION INTRAVENOUS at 20:29

## 2022-01-05 DIAGNOSIS — C43.70 MALIGNANT MELANOMA OF LOWER EXTREMITY, INCLUDING HIP, UNSPECIFIED LATERALITY (HCC): ICD-10-CM

## 2022-01-05 RX ORDER — ACETAMINOPHEN AND CODEINE PHOSPHATE 300; 30 MG/1; MG/1
1 TABLET ORAL EVERY 4 HOURS PRN
Qty: 50 TABLET | Refills: 0 | Status: SHIPPED | OUTPATIENT
Start: 2022-01-05

## 2022-01-05 NOTE — TELEPHONE ENCOUNTER
Please review. Protocol failed or does not have a protocol.      Requested Prescriptions   Pending Prescriptions Disp Refills    ACETAMINOPHEN-CODEINE 300-30 MG Oral Tab [Pharmacy Med Name: ACETAMINOPHEN/COD #3 (300/30MG) TAB] 50 tablet 0     Sig: TAKE 1 TA

## 2022-01-19 ENCOUNTER — OFFICE VISIT (OUTPATIENT)
Dept: PHYSICAL THERAPY | Age: 72
End: 2022-01-19
Attending: UROLOGY
Payer: MEDICARE

## 2022-01-19 PROCEDURE — 97110 THERAPEUTIC EXERCISES: CPT

## 2022-01-19 PROCEDURE — 97112 NEUROMUSCULAR REEDUCATION: CPT

## 2022-01-19 NOTE — PROGRESS NOTES
Dx: Stress incontinence of urine (N39.3)    Insurance (Authorized # of Visits):  [facility] 10 per POC       Authorizing Physician: Dr. Ann Mail      Next MD visit: none scheduled  Fall Risk: standard         Precautions: n/a           Subjective: Using 3 pads no exhale  Sit to stand with exhale/PF coordination NeuroMuscular Blaise  Diaphragmatic breathing with PF 20x  Seated PF with march 10x  Seated PF 5 sec 10x with exhale  Seated PF 5 sec 10x with exhale  Sit to stand with exhale/PF 10            HEP: urge inhibi

## 2022-01-26 ENCOUNTER — APPOINTMENT (OUTPATIENT)
Dept: PHYSICAL THERAPY | Age: 72
End: 2022-01-26
Attending: FAMILY MEDICINE
Payer: MEDICARE

## 2022-01-26 ENCOUNTER — TELEPHONE (OUTPATIENT)
Dept: PHYSICAL THERAPY | Facility: HOSPITAL | Age: 72
End: 2022-01-26

## 2022-02-19 RX ORDER — ACETAMINOPHEN AND CODEINE PHOSPHATE 300; 30 MG/1; MG/1
TABLET ORAL
Qty: 50 TABLET | Refills: 0 | Status: SHIPPED | OUTPATIENT
Start: 2022-02-19

## 2022-02-19 NOTE — TELEPHONE ENCOUNTER
Please review. Protocol Failed or has no Protocol  Requested Prescriptions   Pending Prescriptions Disp Refills    ACETAMINOPHEN-CODEINE 300-30 MG Oral Tab [Pharmacy Med Name: ACETAMINOPHEN/COD #3 (300/30MG) TAB] 50 tablet 0     Sig: TAKE 1 TABLET BY MOUTH EVERY 4 HOURS AS NEEDED FOR PAIN        There is no refill protocol information for this order         Future Appointments         Provider Department Appt Notes    In 1 week [address] Hematology Oncology CBC, Iron&NWEI-Qucnvnv-LV LAB. PORT. CL    In 1 week Manoj Alatorre MD [facility] Hematology Oncology FOLLOW UP VISIT. CL  3m          Recent Outpatient Visits              1 month ago     [facility]    Office Visit    1 month ago     [facility]    Office Visit    2 months ago     [facility]    Office Visit    2 months ago Encounter for follow-up surveillance of [address] Hematology Oncology Manoj Alatorre MD    Office Visit    2 months ago Other iron deficiency anemia    [address] - Infusion    Nurse Only

## 2022-03-04 ENCOUNTER — OFFICE VISIT (OUTPATIENT)
Dept: HEMATOLOGY/ONCOLOGY | Facility: HOSPITAL | Age: 72
End: 2022-03-04
Attending: INTERNAL MEDICINE
Payer: MEDICARE

## 2022-03-04 VITALS
WEIGHT: 251 LBS | HEIGHT: 74 IN | HEART RATE: 53 BPM | TEMPERATURE: 98 F | SYSTOLIC BLOOD PRESSURE: 126 MMHG | RESPIRATION RATE: 16 BRPM | BODY MASS INDEX: 32.21 KG/M2 | DIASTOLIC BLOOD PRESSURE: 63 MMHG | OXYGEN SATURATION: 99 %

## 2022-03-04 DIAGNOSIS — C61 PROSTATE CANCER (HCC): Primary | ICD-10-CM

## 2022-03-04 DIAGNOSIS — D50.8 IRON DEFICIENCY ANEMIA DUE TO DIETARY CAUSES: ICD-10-CM

## 2022-03-04 DIAGNOSIS — Z85.820 ENCOUNTER FOR FOLLOW-UP SURVEILLANCE OF MELANOMA: Primary | ICD-10-CM

## 2022-03-04 DIAGNOSIS — Z08 ENCOUNTER FOR FOLLOW-UP SURVEILLANCE OF MELANOMA: Primary | ICD-10-CM

## 2022-03-04 LAB
ALBUMIN SERPL-MCNC: 3.6 G/DL (ref 3.4–5)
ALBUMIN/GLOB SERPL: 1.1 {RATIO} (ref 1–2)
ALP LIVER SERPL-CCNC: 83 U/L
ALT SERPL-CCNC: 21 U/L
ANION GAP SERPL CALC-SCNC: 6 MMOL/L (ref 0–18)
AST SERPL-CCNC: 20 U/L (ref 15–37)
BASOPHILS # BLD AUTO: 0.03 X10(3) UL (ref 0–0.2)
BASOPHILS NFR BLD AUTO: 0.7 %
BILIRUB SERPL-MCNC: 0.4 MG/DL (ref 0.1–2)
BUN BLD-MCNC: 13 MG/DL (ref 7–18)
BUN/CREAT SERPL: 13.1 (ref 10–20)
CALCIUM BLD-MCNC: 9.2 MG/DL (ref 8.5–10.1)
CHLORIDE SERPL-SCNC: 109 MMOL/L (ref 98–112)
CO2 SERPL-SCNC: 27 MMOL/L (ref 21–32)
CREAT BLD-MCNC: 0.99 MG/DL
DEPRECATED RDW RBC AUTO: 45.1 FL (ref 35.1–46.3)
EOSINOPHIL # BLD AUTO: 0.3 X10(3) UL (ref 0–0.7)
EOSINOPHIL NFR BLD AUTO: 7.1 %
ERYTHROCYTE [DISTWIDTH] IN BLOOD BY AUTOMATED COUNT: 14.9 % (ref 11–15)
GLOBULIN PLAS-MCNC: 3.3 G/DL (ref 2.8–4.4)
GLUCOSE BLD-MCNC: 110 MG/DL (ref 70–99)
HCT VFR BLD AUTO: 35.3 %
HGB BLD-MCNC: 11.2 G/DL
IMM GRANULOCYTES # BLD AUTO: 0.01 X10(3) UL (ref 0–1)
IMM GRANULOCYTES NFR BLD: 0.2 %
IRON SATN MFR SERPL: 16 %
IRON SERPL-MCNC: 58 UG/DL
LYMPHOCYTES # BLD AUTO: 0.88 X10(3) UL (ref 1–4)
LYMPHOCYTES NFR BLD AUTO: 20.9 %
MCH RBC QN AUTO: 26.3 PG (ref 26–34)
MCHC RBC AUTO-ENTMCNC: 31.7 G/DL (ref 31–37)
MCV RBC AUTO: 82.9 FL
MONOCYTES # BLD AUTO: 0.46 X10(3) UL (ref 0.1–1)
NEUTROPHILS # BLD AUTO: 2.53 X10 (3) UL (ref 1.5–7.7)
NEUTROPHILS # BLD AUTO: 2.53 X10(3) UL (ref 1.5–7.7)
NEUTROPHILS NFR BLD AUTO: 60.2 %
OSMOLALITY SERPL CALC.SUM OF ELEC: 295 MOSM/KG (ref 275–295)
PLATELET # BLD AUTO: 192 10(3)UL (ref 150–450)
POTASSIUM SERPL-SCNC: 3.3 MMOL/L (ref 3.5–5.1)
PROT SERPL-MCNC: 6.9 G/DL (ref 6.4–8.2)
RBC # BLD AUTO: 4.26 X10(6)UL
SODIUM SERPL-SCNC: 142 MMOL/L (ref 136–145)
TIBC SERPL-MCNC: 359 UG/DL (ref 240–450)
TRANSFERRIN SERPL-MCNC: 241 MG/DL (ref 200–360)
WBC # BLD AUTO: 4.2 X10(3) UL (ref 4–11)

## 2022-03-04 PROCEDURE — 99214 OFFICE O/P EST MOD 30 MIN: CPT | Performed by: INTERNAL MEDICINE

## 2022-03-04 PROCEDURE — 85025 COMPLETE CBC W/AUTO DIFF WBC: CPT

## 2022-03-04 PROCEDURE — 80053 COMPREHEN METABOLIC PANEL: CPT

## 2022-03-04 PROCEDURE — 83540 ASSAY OF IRON: CPT

## 2022-03-04 PROCEDURE — 84466 ASSAY OF TRANSFERRIN: CPT

## 2022-03-04 PROCEDURE — 36591 DRAW BLOOD OFF VENOUS DEVICE: CPT

## 2022-03-04 RX ORDER — HEPARIN SODIUM (PORCINE) LOCK FLUSH IV SOLN 100 UNIT/ML 100 UNIT/ML
5 SOLUTION INTRAVENOUS ONCE
Status: COMPLETED | OUTPATIENT
Start: 2022-03-04 | End: 2022-03-04

## 2022-03-04 RX ADMIN — HEPARIN SODIUM (PORCINE) LOCK FLUSH IV SOLN 100 UNIT/ML 500 UNITS: 100 SOLUTION INTRAVENOUS at 08:18:00

## 2022-03-15 ENCOUNTER — OFFICE VISIT (OUTPATIENT)
Dept: FAMILY MEDICINE CLINIC | Facility: CLINIC | Age: 72
End: 2022-03-15
Payer: COMMERCIAL

## 2022-03-15 VITALS
HEIGHT: 74 IN | DIASTOLIC BLOOD PRESSURE: 70 MMHG | TEMPERATURE: 98 F | HEART RATE: 80 BPM | BODY MASS INDEX: 32.6 KG/M2 | WEIGHT: 254 LBS | SYSTOLIC BLOOD PRESSURE: 129 MMHG

## 2022-03-15 DIAGNOSIS — M79.672 LEFT FOOT PAIN: ICD-10-CM

## 2022-03-15 DIAGNOSIS — I10 ESSENTIAL HYPERTENSION: ICD-10-CM

## 2022-03-15 DIAGNOSIS — D50.9 IRON DEFICIENCY ANEMIA, UNSPECIFIED IRON DEFICIENCY ANEMIA TYPE: Primary | ICD-10-CM

## 2022-03-15 PROCEDURE — 99214 OFFICE O/P EST MOD 30 MIN: CPT | Performed by: FAMILY MEDICINE

## 2022-03-15 PROCEDURE — 3074F SYST BP LT 130 MM HG: CPT | Performed by: FAMILY MEDICINE

## 2022-03-15 PROCEDURE — 3078F DIAST BP <80 MM HG: CPT | Performed by: FAMILY MEDICINE

## 2022-03-15 PROCEDURE — 3008F BODY MASS INDEX DOCD: CPT | Performed by: FAMILY MEDICINE

## 2022-03-15 RX ORDER — FERROUS SULFATE 137(45) MG
142 TABLET, EXTENDED RELEASE ORAL DAILY
Qty: 90 TABLET | Refills: 3 | Status: SHIPPED | OUTPATIENT
Start: 2022-03-15

## 2022-03-15 NOTE — PATIENT INSTRUCTIONS
Slow-Fe iron prescribed. Consider apple cider vinegar Gummies to be taken to at 1 time daily. Medication reviewed and renewed where needed and appropriate. Comply with medications. Monitor blood pressures and record at home. Limit salt intake. Pain management via prescription medications as needed.

## 2022-03-21 ENCOUNTER — TELEPHONE (OUTPATIENT)
Dept: FAMILY MEDICINE CLINIC | Facility: CLINIC | Age: 72
End: 2022-03-21

## 2022-04-01 DIAGNOSIS — G62.9 NEUROPATHY: ICD-10-CM

## 2022-04-02 NOTE — TELEPHONE ENCOUNTER
There is a high alert notification that I am not permitted to override, see below message. .  pls advise, thanks in advance. Significance: Exceeds Daily, Single     Patient Information  Age: 66year old (76,933 days)  Weight: 115.2 kg [Weight as of Tue Mar 15, 2022 1115]  Sex: Male  Creatinine clearance could not be determined. Patient's most recent lab result is older than the maximum 7 days allowed. (Serum creatinine could not be determined. Patient's most recent lab result is older than the maximum 7 days allowed.)   Diagnoses used to match Medical Conditions: Hyperlipidemia;  Mixed hyperlipidemia      Ordered daily dose: 80 mg (80 mg Nightly)  Recommended daily dose range: 20 - 40 mg  Exceeds maximum by: 100% (40 mg)    Ordered single dose: 80 mg  Maximum single dose: 40 mg  Exceeds maximum by: 100% (40 mg)    Other Information  Ordering Mode: Outpatient  Route: Oral and equivalent            Refill passed per [facility] protocol   Requested Prescriptions   Pending Prescriptions Disp Refills    SIMVASTATIN 80 MG Oral Tab [Pharmacy Med Name: SIMVASTATIN  80MG  TAB] 90 tablet 3     Sig: TAKE 1 TABLET BY MOUTH AT  NIGHT        Cholesterol Medication Protocol Passed - 4/1/2022 10:07 PM        Passed - ALT in past 12 months        Passed - LDL in past 12 months        Passed - Last ALT < 80       Lab Results   Component Value Date    ALT 21 03/04/2022             Passed - Last LDL < 130     Lab Results   Component Value Date    LDL 34 09/25/2021               Passed - Appointment in past 12 or next 3 months

## 2022-04-04 RX ORDER — SIMVASTATIN 80 MG
80 TABLET ORAL NIGHTLY
Qty: 90 TABLET | Refills: 1 | Status: SHIPPED | OUTPATIENT
Start: 2022-04-04

## 2022-04-05 RX ORDER — GABAPENTIN 300 MG/1
CAPSULE ORAL
Qty: 270 CAPSULE | Refills: 3 | Status: SHIPPED | OUTPATIENT
Start: 2022-04-05 | End: 2022-07-14

## 2022-04-08 RX ORDER — ACETAMINOPHEN AND CODEINE PHOSPHATE 300; 30 MG/1; MG/1
1 TABLET ORAL EVERY 4 HOURS PRN
Qty: 50 TABLET | Refills: 0 | Status: SHIPPED | OUTPATIENT
Start: 2022-04-08

## 2022-04-08 NOTE — TELEPHONE ENCOUNTER
Please review refill protocol failed/ no protocol  Requested Prescriptions   Pending Prescriptions Disp Refills    ACETAMINOPHEN-CODEINE 300-30 MG Oral Tab [Pharmacy Med Name: ACETAMINOPHEN/COD #3 (300/30MG) TAB] 50 tablet 0     Sig: TAKE 1 TABLET BY MOUTH EVERY 4 HOURS AS NEEDED FOR PAIN        There is no refill protocol information for this order

## 2022-05-17 ENCOUNTER — HOSPITAL ENCOUNTER (OUTPATIENT)
Age: 72
Discharge: HOME | End: 2022-05-17
Payer: MEDICARE

## 2022-05-17 ENCOUNTER — HOSPITAL ENCOUNTER (OUTPATIENT)
Age: 72
Discharge: STILL ADMITTED OR ADMINISTRATIVE | End: 2022-05-17
Payer: MEDICARE

## 2022-05-17 VITALS
OXYGEN SATURATION: 98 % | SYSTOLIC BLOOD PRESSURE: 121 MMHG | HEART RATE: 58 BPM | TEMPERATURE: 98 F | RESPIRATION RATE: 20 BRPM | DIASTOLIC BLOOD PRESSURE: 69 MMHG

## 2022-05-17 DIAGNOSIS — U07.1 COVID-19 VIRUS INFECTION: Primary | ICD-10-CM

## 2022-05-17 LAB — SARS-COV-2 RNA RESP QL NAA+PROBE: DETECTED

## 2022-05-17 PROCEDURE — 99214 OFFICE O/P EST MOD 30 MIN: CPT

## 2022-05-17 RX ORDER — BEBTELOVIMAB 87.5 MG/ML
175 INJECTION, SOLUTION INTRAVENOUS ONCE
Status: DISCONTINUED | OUTPATIENT
Start: 2022-05-17 | End: 2022-05-17

## 2022-05-18 ENCOUNTER — TELEPHONE (OUTPATIENT)
Dept: CASE MANAGEMENT | Age: 72
End: 2022-05-18

## 2022-05-18 NOTE — TELEPHONE ENCOUNTER
Pt received MAB infusion at [address] [place] [address] on 5/17/22 for COVID-19. Please follow-up with pt for post-infusion assessment and home monitoring if needed. Thank you.

## 2022-05-23 ENCOUNTER — APPOINTMENT (OUTPATIENT)
Dept: GENERAL RADIOLOGY | Facility: HOSPITAL | Age: 72
End: 2022-05-23
Attending: EMERGENCY MEDICINE
Payer: MEDICARE

## 2022-05-23 ENCOUNTER — HOSPITAL ENCOUNTER (EMERGENCY)
Facility: HOSPITAL | Age: 72
Discharge: HOME | End: 2022-05-23
Attending: EMERGENCY MEDICINE
Payer: MEDICARE

## 2022-05-23 VITALS
HEART RATE: 51 BPM | RESPIRATION RATE: 18 BRPM | OXYGEN SATURATION: 96 % | TEMPERATURE: 97 F | WEIGHT: 251 LBS | BODY MASS INDEX: 32.21 KG/M2 | DIASTOLIC BLOOD PRESSURE: 62 MMHG | HEIGHT: 74 IN | SYSTOLIC BLOOD PRESSURE: 105 MMHG

## 2022-05-23 DIAGNOSIS — U07.1 COVID: Primary | ICD-10-CM

## 2022-05-23 PROCEDURE — 71045 X-RAY EXAM CHEST 1 VIEW: CPT | Performed by: EMERGENCY MEDICINE

## 2022-05-23 PROCEDURE — 99284 EMERGENCY DEPT VISIT MOD MDM: CPT

## 2022-05-23 RX ORDER — BEBTELOVIMAB 87.5 MG/ML
175 INJECTION, SOLUTION INTRAVENOUS ONCE
Status: COMPLETED | OUTPATIENT
Start: 2022-05-23 | End: 2022-05-23

## 2022-05-23 RX ORDER — DOXYCYCLINE HYCLATE 100 MG/1
100 CAPSULE ORAL 2 TIMES DAILY
Qty: 14 CAPSULE | Refills: 0 | Status: SHIPPED | OUTPATIENT
Start: 2022-05-23 | End: 2022-05-30

## 2022-05-23 RX ORDER — ACETAMINOPHEN 500 MG
1000 TABLET ORAL ONCE
Status: COMPLETED | OUTPATIENT
Start: 2022-05-23 | End: 2022-05-23

## 2022-05-23 NOTE — ED INITIAL ASSESSMENT (HPI)
Pt reports lethargy since Tuesday when he tested COVID+. Pt reports fevers and cough. Pt reports \"i'm here for you to make me feel better. \"

## 2022-05-24 DIAGNOSIS — C43.70 MALIGNANT MELANOMA OF LOWER EXTREMITY, INCLUDING HIP, UNSPECIFIED LATERALITY (HCC): ICD-10-CM

## 2022-05-24 NOTE — ED QUICK NOTES
Pt resting in room alert, respirations even and unlabored, skin color wnl, no acute distress noted. Call light within reach.

## 2022-05-25 RX ORDER — ACETAMINOPHEN AND CODEINE PHOSPHATE 300; 30 MG/1; MG/1
1 TABLET ORAL EVERY 4 HOURS PRN
Qty: 50 TABLET | Refills: 0 | Status: SHIPPED | OUTPATIENT
Start: 2022-05-25

## 2022-05-25 NOTE — TELEPHONE ENCOUNTER
Please review. No protocol. Requested Prescriptions   Pending Prescriptions Disp Refills    ACETAMINOPHEN-CODEINE 300-30 MG Oral Tab [Pharmacy Med Name: ACETAMINOPHEN/COD #3 (300/30MG) TAB] 50 tablet 0     Sig: TAKE 1 TABLET BY MOUTH EVERY 4 HOURS AS NEEDED FOR PAIN        There is no refill protocol information for this order         Future Appointments         Provider Department Appt Notes    In 1 week Delmy Call, DO [address], [facility] visit   policy informed    In 1 week EM [facility] CBC, Iron&TIBS-CMP-FT LAB. PORT FLUSH. CL    In 1 week Camelia Sloan MD [facility] Hematology Oncology FOLLOW UP VISIT. CL  3m          Recent Outpatient Visits              2 months ago Iron deficiency anemia, unspecified iron deficiency anemia type    [address], [place], [place], [place]    Office Visit    2 months ago [facility]state [facility]    Nurse Only    2 months ago Encounter for follow-up surveillance of [address] Hematology Oncology Camelia Sloan MD    Office Visit    4 months ago     [facility]    Office Visit    5 months ago     [facility]    Office Visit

## 2022-06-01 NOTE — PATIENT INSTRUCTIONS
All adult screening ordered and done appropriate for patient's age and gender and risk factors and complaints. Recommend weight loss via daily exercising and consistent healthy dietary changes. Monitor blood pressures and record at home.  Limit salt intak Patient calling to schedule new patient appointment for epilepsy  Patient lived in [place] for 40 years and has moved back  Neurologist in [place]   Patient establishing here  No testing done and well controlled on medication according to family  Triage intake sent

## 2022-06-02 ENCOUNTER — OFFICE VISIT (OUTPATIENT)
Dept: FAMILY MEDICINE CLINIC | Facility: CLINIC | Age: 72
End: 2022-06-02
Payer: COMMERCIAL

## 2022-06-02 VITALS
DIASTOLIC BLOOD PRESSURE: 69 MMHG | BODY MASS INDEX: 30.82 KG/M2 | WEIGHT: 240.13 LBS | HEART RATE: 54 BPM | HEIGHT: 74 IN | SYSTOLIC BLOOD PRESSURE: 118 MMHG

## 2022-06-02 DIAGNOSIS — I25.2 HISTORY OF MI (MYOCARDIAL INFARCTION): ICD-10-CM

## 2022-06-02 DIAGNOSIS — I10 ESSENTIAL HYPERTENSION: ICD-10-CM

## 2022-06-02 DIAGNOSIS — D50.9 IRON DEFICIENCY ANEMIA, UNSPECIFIED IRON DEFICIENCY ANEMIA TYPE: ICD-10-CM

## 2022-06-02 DIAGNOSIS — Z00.00 MEDICARE ANNUAL WELLNESS VISIT, INITIAL: Primary | ICD-10-CM

## 2022-06-02 RX ORDER — FERROUS SULFATE 137(45) MG
142 TABLET, EXTENDED RELEASE ORAL DAILY
Qty: 90 TABLET | Refills: 3 | Status: SHIPPED | OUTPATIENT
Start: 2022-06-02

## 2022-06-02 NOTE — PATIENT INSTRUCTIONS
All adult screening ordered and done appropriate for patient's age and gender and risk factors and complaints. Medication reviewed and renewed where needed and appropriate. Comply with medications. Monitor blood pressures and record at home. Limit salt intake. Recommend weight loss via daily exercising and consistent healthy dietary changes. Encouraged physical fitness and daily physical activity daily.

## 2022-06-06 ENCOUNTER — NURSE ONLY (OUTPATIENT)
Dept: HEMATOLOGY/ONCOLOGY | Facility: HOSPITAL | Age: 72
End: 2022-06-06
Attending: INTERNAL MEDICINE
Payer: MEDICARE

## 2022-06-06 VITALS
TEMPERATURE: 98 F | HEART RATE: 51 BPM | SYSTOLIC BLOOD PRESSURE: 133 MMHG | HEIGHT: 74 IN | BODY MASS INDEX: 31.44 KG/M2 | OXYGEN SATURATION: 98 % | DIASTOLIC BLOOD PRESSURE: 67 MMHG | WEIGHT: 245 LBS | RESPIRATION RATE: 16 BRPM

## 2022-06-06 DIAGNOSIS — C61 PROSTATE CANCER (HCC): ICD-10-CM

## 2022-06-06 DIAGNOSIS — C61 PROSTATE CANCER (HCC): Primary | ICD-10-CM

## 2022-06-06 DIAGNOSIS — D50.8 IRON DEFICIENCY ANEMIA DUE TO DIETARY CAUSES: ICD-10-CM

## 2022-06-06 DIAGNOSIS — D50.8 IRON DEFICIENCY ANEMIA DUE TO DIETARY CAUSES: Primary | ICD-10-CM

## 2022-06-06 DIAGNOSIS — Z08 ENCOUNTER FOR FOLLOW-UP SURVEILLANCE OF MELANOMA: ICD-10-CM

## 2022-06-06 DIAGNOSIS — Z85.820 ENCOUNTER FOR FOLLOW-UP SURVEILLANCE OF MELANOMA: ICD-10-CM

## 2022-06-06 LAB
ALBUMIN SERPL-MCNC: 3.4 G/DL (ref 3.4–5)
ALBUMIN/GLOB SERPL: 1 {RATIO} (ref 1–2)
ALP LIVER SERPL-CCNC: 80 U/L
ALT SERPL-CCNC: 24 U/L
ANION GAP SERPL CALC-SCNC: 3 MMOL/L (ref 0–18)
AST SERPL-CCNC: 22 U/L (ref 15–37)
BASOPHILS # BLD AUTO: 0.04 X10(3) UL (ref 0–0.2)
BASOPHILS NFR BLD AUTO: 0.9 %
BILIRUB SERPL-MCNC: 0.3 MG/DL (ref 0.1–2)
BUN BLD-MCNC: 17 MG/DL (ref 7–18)
BUN/CREAT SERPL: 16.5 (ref 10–20)
CALCIUM BLD-MCNC: 9.4 MG/DL (ref 8.5–10.1)
CHLORIDE SERPL-SCNC: 105 MMOL/L (ref 98–112)
CO2 SERPL-SCNC: 31 MMOL/L (ref 21–32)
CREAT BLD-MCNC: 1.03 MG/DL
DEPRECATED HBV CORE AB SER IA-ACNC: 187.4 NG/ML
DEPRECATED RDW RBC AUTO: 42.2 FL (ref 35.1–46.3)
EOSINOPHIL # BLD AUTO: 0.27 X10(3) UL (ref 0–0.7)
EOSINOPHIL NFR BLD AUTO: 5.9 %
ERYTHROCYTE [DISTWIDTH] IN BLOOD BY AUTOMATED COUNT: 14 % (ref 11–15)
GLOBULIN PLAS-MCNC: 3.5 G/DL (ref 2.8–4.4)
GLUCOSE BLD-MCNC: 103 MG/DL (ref 70–99)
HCT VFR BLD AUTO: 34.6 %
HGB BLD-MCNC: 10.9 G/DL
IMM GRANULOCYTES # BLD AUTO: 0.01 X10(3) UL (ref 0–1)
IMM GRANULOCYTES NFR BLD: 0.2 %
IRON SATN MFR SERPL: 21 %
IRON SERPL-MCNC: 63 UG/DL
LYMPHOCYTES # BLD AUTO: 1.03 X10(3) UL (ref 1–4)
LYMPHOCYTES NFR BLD AUTO: 22.5 %
MCH RBC QN AUTO: 26.1 PG (ref 26–34)
MCHC RBC AUTO-ENTMCNC: 31.5 G/DL (ref 31–37)
MCV RBC AUTO: 82.8 FL
MONOCYTES # BLD AUTO: 0.6 X10(3) UL (ref 0.1–1)
MONOCYTES NFR BLD AUTO: 13.1 %
NEUTROPHILS # BLD AUTO: 2.63 X10 (3) UL (ref 1.5–7.7)
NEUTROPHILS # BLD AUTO: 2.63 X10(3) UL (ref 1.5–7.7)
NEUTROPHILS NFR BLD AUTO: 57.4 %
OSMOLALITY SERPL CALC.SUM OF ELEC: 290 MOSM/KG (ref 275–295)
PLATELET # BLD AUTO: 276 10(3)UL (ref 150–450)
POTASSIUM SERPL-SCNC: 3.8 MMOL/L (ref 3.5–5.1)
PROT SERPL-MCNC: 6.9 G/DL (ref 6.4–8.2)
RBC # BLD AUTO: 4.18 X10(6)UL
SODIUM SERPL-SCNC: 139 MMOL/L (ref 136–145)
TIBC SERPL-MCNC: 298 UG/DL (ref 240–450)
TRANSFERRIN SERPL-MCNC: 200 MG/DL (ref 200–360)
WBC # BLD AUTO: 4.6 X10(3) UL (ref 4–11)

## 2022-06-06 PROCEDURE — 36591 DRAW BLOOD OFF VENOUS DEVICE: CPT

## 2022-06-06 PROCEDURE — 80053 COMPREHEN METABOLIC PANEL: CPT

## 2022-06-06 PROCEDURE — 82728 ASSAY OF FERRITIN: CPT

## 2022-06-06 PROCEDURE — 85025 COMPLETE CBC W/AUTO DIFF WBC: CPT

## 2022-06-06 PROCEDURE — 83540 ASSAY OF IRON: CPT

## 2022-06-06 PROCEDURE — 84466 ASSAY OF TRANSFERRIN: CPT

## 2022-06-06 PROCEDURE — 99213 OFFICE O/P EST LOW 20 MIN: CPT | Performed by: INTERNAL MEDICINE

## 2022-06-06 RX ORDER — HEPARIN SODIUM (PORCINE) LOCK FLUSH IV SOLN 100 UNIT/ML 100 UNIT/ML
5 SOLUTION INTRAVENOUS ONCE
Status: COMPLETED | OUTPATIENT
Start: 2022-06-06 | End: 2022-06-06

## 2022-06-06 RX ADMIN — HEPARIN SODIUM (PORCINE) LOCK FLUSH IV SOLN 100 UNIT/ML 500 UNITS: 100 SOLUTION INTRAVENOUS at 10:45:00

## 2022-06-06 NOTE — PATIENT INSTRUCTIONS
Please call Dr. César Orozco [phone]  for follow up - colonoscopy   and Michael Rasmussen [phone]  for a full body skin check

## 2022-06-07 ENCOUNTER — TELEPHONE (OUTPATIENT)
Dept: FAMILY MEDICINE CLINIC | Facility: CLINIC | Age: 72
End: 2022-06-07

## 2022-06-07 DIAGNOSIS — E61.1 LOW IRON: ICD-10-CM

## 2022-06-07 DIAGNOSIS — Z12.11 SCREENING FOR MALIGNANT NEOPLASM OF COLON: ICD-10-CM

## 2022-06-07 DIAGNOSIS — R21 RASH: Primary | ICD-10-CM

## 2022-06-07 NOTE — TELEPHONE ENCOUNTER
Patient came into the office advising his oncologist would like him to see a   Dermatologist and also GI doc for his colonoscopy. Please put in referral for   [facility] Dermatologist and also a referral for a colonoscopy due to low iron issues. Pls let patient know when these are in the for him to call and make appt.

## 2022-06-15 ENCOUNTER — TELEPHONE (OUTPATIENT)
Dept: FAMILY MEDICINE CLINIC | Facility: CLINIC | Age: 72
End: 2022-06-15

## 2022-06-15 ENCOUNTER — PATIENT MESSAGE (OUTPATIENT)
Dept: FAMILY MEDICINE CLINIC | Facility: CLINIC | Age: 72
End: 2022-06-15

## 2022-06-16 ENCOUNTER — PATIENT MESSAGE (OUTPATIENT)
Dept: FAMILY MEDICINE CLINIC | Facility: CLINIC | Age: 72
End: 2022-06-16

## 2022-06-16 DIAGNOSIS — C43.70 MALIGNANT MELANOMA OF LOWER EXTREMITY, INCLUDING HIP, UNSPECIFIED LATERALITY (HCC): ICD-10-CM

## 2022-06-16 NOTE — TELEPHONE ENCOUNTER
See telephone encounter on  6/15/22      From: Karely Dempsey  To: Syed Norris DO  Sent: 6/15/2022  1:30 PM CDT  Subject: Pain     This is what I am experiencing: Deep gluteal syndrome is pain and numbness you feel in your buttocks (bottom) that can sometimes go down the back of your leg. This pain is caused by the muscles, skin, and blood vessels in your buttocks pressing on nerves. This pressure sends signals to the brain that can cause severe pain.

## 2022-06-16 NOTE — TELEPHONE ENCOUNTER
----- Message from [address] [place] [address]. Ciera sent at 6/15/2022  1:30 PM CDT -----  Regarding: Pain   This is what I am experiencing: Deep gluteal syndrome is pain and numbness you feel in your buttocks (bottom) that can sometimes go down the back of your leg. This pain is caused by the muscles, skin, and blood vessels in your buttocks pressing on nerves. This pressure sends signals to the brain that can cause severe pain.

## 2022-06-17 NOTE — TELEPHONE ENCOUNTER
Patient returned called stating he no longer has the pain in the buttocks because he increased the Gabapentin to 3 capsules (900 mg) BID. Advised patient the medication increase should be discussed with PCP. Patient wants to know if taking the medication this way is ok. Please advise. gabapentin 300 MG Oral Cap 270 capsule 3 4/5/2022     Sig: Take 1 capsule by mouth in the morning and 2 capsules at bedtime.     Sent to pharmacy as: Gabapentin 300 MG Oral Capsule (NEURONTIN)    Notes to Pharmacy: Requesting 1 year supply    E-Prescribing Status: Receipt confirmed by pharmacy (4/5/2022 11:19 AM CDT)        Associated Diagnoses    Neuropathy

## 2022-06-18 RX ORDER — ACETAMINOPHEN AND CODEINE PHOSPHATE 300; 30 MG/1; MG/1
TABLET ORAL
Qty: 50 TABLET | Refills: 0 | Status: SHIPPED | OUTPATIENT
Start: 2022-06-18

## 2022-06-18 NOTE — TELEPHONE ENCOUNTER
Please let the patient know that it is okay to continue with the 900 mg orally twice a day of the gabapentin. Thank you.

## 2022-06-22 ENCOUNTER — APPOINTMENT (OUTPATIENT)
Dept: GENERAL RADIOLOGY | Facility: HOSPITAL | Age: 72
End: 2022-06-22
Attending: EMERGENCY MEDICINE
Payer: MEDICARE

## 2022-06-22 ENCOUNTER — HOSPITAL ENCOUNTER (EMERGENCY)
Facility: HOSPITAL | Age: 72
Discharge: HOME | End: 2022-06-22
Attending: EMERGENCY MEDICINE
Payer: MEDICARE

## 2022-06-22 VITALS
WEIGHT: 245 LBS | SYSTOLIC BLOOD PRESSURE: 130 MMHG | RESPIRATION RATE: 18 BRPM | OXYGEN SATURATION: 95 % | HEART RATE: 60 BPM | TEMPERATURE: 98 F | BODY MASS INDEX: 31 KG/M2 | DIASTOLIC BLOOD PRESSURE: 78 MMHG

## 2022-06-22 DIAGNOSIS — R05.9 COUGH: ICD-10-CM

## 2022-06-22 DIAGNOSIS — R04.2 HEMOPTYSIS: Primary | ICD-10-CM

## 2022-06-22 LAB
ANION GAP SERPL CALC-SCNC: 7 MMOL/L (ref 0–18)
BASOPHILS # BLD AUTO: 0.02 X10(3) UL (ref 0–0.2)
BASOPHILS NFR BLD AUTO: 0.3 %
BUN BLD-MCNC: 13 MG/DL (ref 7–18)
BUN/CREAT SERPL: 13 (ref 10–20)
CALCIUM BLD-MCNC: 9.1 MG/DL (ref 8.5–10.1)
CHLORIDE SERPL-SCNC: 103 MMOL/L (ref 98–112)
CO2 SERPL-SCNC: 29 MMOL/L (ref 21–32)
CREAT BLD-MCNC: 1 MG/DL
DEPRECATED RDW RBC AUTO: 44.7 FL (ref 35.1–46.3)
EOSINOPHIL # BLD AUTO: 0.38 X10(3) UL (ref 0–0.7)
EOSINOPHIL NFR BLD AUTO: 6 %
ERYTHROCYTE [DISTWIDTH] IN BLOOD BY AUTOMATED COUNT: 14.5 % (ref 11–15)
GLUCOSE BLD-MCNC: 100 MG/DL (ref 70–99)
HCT VFR BLD AUTO: 35 %
HGB BLD-MCNC: 10.9 G/DL
IMM GRANULOCYTES # BLD AUTO: 0.03 X10(3) UL (ref 0–1)
IMM GRANULOCYTES NFR BLD: 0.5 %
LYMPHOCYTES # BLD AUTO: 1.06 X10(3) UL (ref 1–4)
LYMPHOCYTES NFR BLD AUTO: 16.8 %
MCH RBC QN AUTO: 26.3 PG (ref 26–34)
MCHC RBC AUTO-ENTMCNC: 31.1 G/DL (ref 31–37)
MCV RBC AUTO: 84.3 FL
MONOCYTES # BLD AUTO: 0.66 X10(3) UL (ref 0.1–1)
MONOCYTES NFR BLD AUTO: 10.4 %
NEUTROPHILS # BLD AUTO: 4.17 X10 (3) UL (ref 1.5–7.7)
NEUTROPHILS # BLD AUTO: 4.17 X10(3) UL (ref 1.5–7.7)
NEUTROPHILS NFR BLD AUTO: 66 %
OSMOLALITY SERPL CALC.SUM OF ELEC: 288 MOSM/KG (ref 275–295)
PLATELET # BLD AUTO: 234 10(3)UL (ref 150–450)
POTASSIUM SERPL-SCNC: 3.4 MMOL/L (ref 3.5–5.1)
RBC # BLD AUTO: 4.15 X10(6)UL
SODIUM SERPL-SCNC: 139 MMOL/L (ref 136–145)
WBC # BLD AUTO: 6.3 X10(3) UL (ref 4–11)

## 2022-06-22 PROCEDURE — 99283 EMERGENCY DEPT VISIT LOW MDM: CPT

## 2022-06-22 PROCEDURE — 80048 BASIC METABOLIC PNL TOTAL CA: CPT | Performed by: EMERGENCY MEDICINE

## 2022-06-22 PROCEDURE — 71045 X-RAY EXAM CHEST 1 VIEW: CPT | Performed by: EMERGENCY MEDICINE

## 2022-06-22 PROCEDURE — 36415 COLL VENOUS BLD VENIPUNCTURE: CPT

## 2022-06-22 PROCEDURE — 85025 COMPLETE CBC W/AUTO DIFF WBC: CPT | Performed by: EMERGENCY MEDICINE

## 2022-06-22 NOTE — ED INITIAL ASSESSMENT (HPI)
Cough x couple weeks. Denies chest pain/ denies shortness of breath  Patient notes a blood clot today when he coughed today  Notes some specks of blood after     Notes his oncologist has had concerns due to patient hemoglobin going down.   Hx of melanoma and prostate  No current treatment

## 2022-06-30 ENCOUNTER — HOSPITAL ENCOUNTER (OUTPATIENT)
Age: 72
Discharge: HOME | End: 2022-06-30
Payer: MEDICARE

## 2022-06-30 ENCOUNTER — PATIENT MESSAGE (OUTPATIENT)
Dept: FAMILY MEDICINE CLINIC | Facility: CLINIC | Age: 72
End: 2022-06-30

## 2022-06-30 VITALS
OXYGEN SATURATION: 96 % | DIASTOLIC BLOOD PRESSURE: 69 MMHG | TEMPERATURE: 98 F | RESPIRATION RATE: 20 BRPM | SYSTOLIC BLOOD PRESSURE: 125 MMHG | HEART RATE: 75 BPM

## 2022-06-30 DIAGNOSIS — M54.9 BACK PAIN, UNSPECIFIED BACK LOCATION, UNSPECIFIED BACK PAIN LATERALITY, UNSPECIFIED CHRONICITY: Primary | ICD-10-CM

## 2022-06-30 DIAGNOSIS — M54.42 ACUTE LEFT-SIDED LOW BACK PAIN WITH LEFT-SIDED SCIATICA: Primary | ICD-10-CM

## 2022-06-30 DIAGNOSIS — M54.30 SCIATICA, UNSPECIFIED LATERALITY: ICD-10-CM

## 2022-06-30 PROCEDURE — 99213 OFFICE O/P EST LOW 20 MIN: CPT

## 2022-06-30 RX ORDER — METHYLPREDNISOLONE 4 MG/1
TABLET ORAL
Qty: 1 EACH | Refills: 0 | Status: SHIPPED | OUTPATIENT
Start: 2022-06-30

## 2022-06-30 NOTE — ED INITIAL ASSESSMENT (HPI)
Left hip /gluteus pain radiating to left leg since A[address], denies trauma or injury, denies numbness or tingling, no weakness, no swelling, denies bowel or bladder incontinence

## 2022-07-01 NOTE — TELEPHONE ENCOUNTER
Upon chart review this patient's well-visit was on 6/2/2022 and was advised to follow-up in October. Patient seen in UC for left low back pain and sciatica and now requesting a referral for Dr. Ashley Barrios. Referral pended for approval.    Would you like him to also see you prior to October?

## 2022-07-01 NOTE — TELEPHONE ENCOUNTER
Routed to Dr Flo Briones for advise, thanks. Physiatry referral pended.      Future Appointments   Date Time Provider Geraldo Granti   9/1/2022  3:30 PM [facility]   9/6/2022 11:30 AM EM CC LAB2 [facility] CHEMO EMO   9/8/2022 12:30 PM MD JESSICA Chen SaMBEHSAN  [place]   12/6/2022 10:30 AM EM CC LAB2 [facility] CHEMO EMO   12/6/2022 11:30 AM Kaylyn Stevens MD [address] [place] [address] HEM ONC EMO

## 2022-07-05 ENCOUNTER — APPOINTMENT (OUTPATIENT)
Dept: GENERAL RADIOLOGY | Facility: HOSPITAL | Age: 72
End: 2022-07-05
Attending: EMERGENCY MEDICINE
Payer: MEDICARE

## 2022-07-05 ENCOUNTER — HOSPITAL ENCOUNTER (EMERGENCY)
Facility: HOSPITAL | Age: 72
Discharge: HOME | End: 2022-07-05
Attending: EMERGENCY MEDICINE
Payer: MEDICARE

## 2022-07-05 ENCOUNTER — TELEPHONE (OUTPATIENT)
Dept: PHYSICAL MEDICINE AND REHAB | Facility: CLINIC | Age: 72
End: 2022-07-05

## 2022-07-05 VITALS
BODY MASS INDEX: 32.08 KG/M2 | SYSTOLIC BLOOD PRESSURE: 138 MMHG | RESPIRATION RATE: 20 BRPM | OXYGEN SATURATION: 100 % | DIASTOLIC BLOOD PRESSURE: 63 MMHG | HEART RATE: 65 BPM | HEIGHT: 74 IN | TEMPERATURE: 97 F | WEIGHT: 250 LBS

## 2022-07-05 DIAGNOSIS — M25.561 ACUTE PAIN OF RIGHT KNEE: ICD-10-CM

## 2022-07-05 DIAGNOSIS — S70.11XA CONTUSION OF RIGHT THIGH, INITIAL ENCOUNTER: Primary | ICD-10-CM

## 2022-07-05 PROCEDURE — 73560 X-RAY EXAM OF KNEE 1 OR 2: CPT | Performed by: EMERGENCY MEDICINE

## 2022-07-05 PROCEDURE — 99283 EMERGENCY DEPT VISIT LOW MDM: CPT

## 2022-07-05 RX ORDER — IBUPROFEN 600 MG/1
600 TABLET ORAL ONCE
Status: DISCONTINUED | OUTPATIENT
Start: 2022-07-05 | End: 2022-07-05

## 2022-07-05 RX ORDER — TIZANIDINE HYDROCHLORIDE 2 MG/1
2 CAPSULE, GELATIN COATED ORAL 3 TIMES DAILY
Qty: 16 CAPSULE | Refills: 0 | Status: SHIPPED | OUTPATIENT
Start: 2022-07-05

## 2022-07-05 RX ORDER — HYDROCODONE BITARTRATE AND ACETAMINOPHEN 5; 325 MG/1; MG/1
1 TABLET ORAL EVERY 6 HOURS PRN
Qty: 16 TABLET | Refills: 0 | Status: SHIPPED | OUTPATIENT
Start: 2022-07-05 | End: 2022-07-12

## 2022-07-05 RX ORDER — HYDROCODONE BITARTRATE AND ACETAMINOPHEN 5; 325 MG/1; MG/1
1 TABLET ORAL ONCE
Status: COMPLETED | OUTPATIENT
Start: 2022-07-05 | End: 2022-07-05

## 2022-07-05 NOTE — ED INITIAL ASSESSMENT (HPI)
Patient presents to ER with complaints of leg pain post fall. Patient notes primarily right leg pain. Denies hitting head, no loc.

## 2022-07-06 ENCOUNTER — TELEPHONE (OUTPATIENT)
Dept: GASTROENTEROLOGY | Facility: CLINIC | Age: 72
End: 2022-07-06

## 2022-07-06 NOTE — TELEPHONE ENCOUNTER
Schedulers:  Please contact patient to schedule colonoscopy and EGD per below orders from Dr. Topher Gardiner. Patient has abnormal hemoglobin so routed as high priority.     Thank you

## 2022-07-06 NOTE — TELEPHONE ENCOUNTER
RN to set up for colonoscopy and EGD for LUIZA  Darioly bowel prep   MAC sedation   Hold iron before procedure 5 days    Dr. Cherie Hart        ----- Message from Jb Monson MD sent at 3/4/2022 10:54 AM CST -----  Regarding: repeat GI eval  Jasmeet Harp is here for follow up today as I see him for his history of melanoma. His prostate cancer treatment is going well with an undetectable PSA, still on ADT. Patient has been noted to have some iron deficiency anemia and mentions being a vegetarian for a few years. However, we just reviewed that he's had a history of GI bleeding in the past where he required 6 units of blood. Roverto Byers, I believe you last saw him in 2018 when he had his C-scope in April. You rec a repeat in 5 yrs; (4/2023), but wondering if you would consider him for a repeat eval sooner in light of his iron deficiency anemia and prior GI bleeding history? Bhavesh, are you okay with this plan as well?     Thanks,    [employer]

## 2022-07-11 DIAGNOSIS — C43.70 MALIGNANT MELANOMA OF LOWER EXTREMITY, INCLUDING HIP, UNSPECIFIED LATERALITY (HCC): ICD-10-CM

## 2022-07-12 RX ORDER — ACETAMINOPHEN AND CODEINE PHOSPHATE 300; 30 MG/1; MG/1
TABLET ORAL
Qty: 50 TABLET | Refills: 0 | Status: SHIPPED | OUTPATIENT
Start: 2022-07-12

## 2022-07-14 ENCOUNTER — OFFICE VISIT (OUTPATIENT)
Dept: PHYSICAL MEDICINE AND REHAB | Facility: CLINIC | Age: 72
End: 2022-07-14
Payer: COMMERCIAL

## 2022-07-14 VITALS
DIASTOLIC BLOOD PRESSURE: 60 MMHG | SYSTOLIC BLOOD PRESSURE: 110 MMHG | HEIGHT: 74 IN | WEIGHT: 250 LBS | HEART RATE: 60 BPM | OXYGEN SATURATION: 98 % | BODY MASS INDEX: 32.08 KG/M2

## 2022-07-14 DIAGNOSIS — I25.2 HISTORY OF MI (MYOCARDIAL INFARCTION): ICD-10-CM

## 2022-07-14 DIAGNOSIS — C61 PROSTATE CANCER (HCC): ICD-10-CM

## 2022-07-14 DIAGNOSIS — M54.16 ACUTE LEFT LUMBAR RADICULOPATHY: Primary | ICD-10-CM

## 2022-07-14 DIAGNOSIS — I10 ESSENTIAL HYPERTENSION: ICD-10-CM

## 2022-07-14 PROCEDURE — 3078F DIAST BP <80 MM HG: CPT | Performed by: PHYSICAL MEDICINE & REHABILITATION

## 2022-07-14 PROCEDURE — 99214 OFFICE O/P EST MOD 30 MIN: CPT | Performed by: PHYSICAL MEDICINE & REHABILITATION

## 2022-07-14 PROCEDURE — 3074F SYST BP LT 130 MM HG: CPT | Performed by: PHYSICAL MEDICINE & REHABILITATION

## 2022-07-14 PROCEDURE — 3008F BODY MASS INDEX DOCD: CPT | Performed by: PHYSICAL MEDICINE & REHABILITATION

## 2022-07-14 PROCEDURE — 1125F AMNT PAIN NOTED PAIN PRSNT: CPT | Performed by: PHYSICAL MEDICINE & REHABILITATION

## 2022-07-14 RX ORDER — MELOXICAM 15 MG/1
15 TABLET ORAL DAILY
Qty: 14 TABLET | Refills: 0 | Status: SHIPPED | OUTPATIENT
Start: 2022-07-14 | End: 2022-07-28

## 2022-07-14 RX ORDER — GABAPENTIN 300 MG/1
CAPSULE ORAL
Qty: 90 CAPSULE | Refills: 0 | Status: SHIPPED | OUTPATIENT
Start: 2022-07-14

## 2022-07-14 NOTE — PATIENT INSTRUCTIONS
-Start physical therapy and home exercises  -Mobic daily for the next 7-10 days and then as needed  -Gabapentin 600mg three times daily  -Ice/Heat as tolerated  -Xray on the way out today  -MRI of the lumbar spine and follow up after  -Please stop the medication if you have any side effects and call the office if you have any questions or concerns

## 2022-07-15 ENCOUNTER — TELEPHONE (OUTPATIENT)
Dept: FAMILY MEDICINE CLINIC | Facility: CLINIC | Age: 72
End: 2022-07-15

## 2022-07-15 NOTE — TELEPHONE ENCOUNTER
Pt states that he saw Dr. Scott Alexander yesterday and he is referring him to have physical therapy 2 to 3 times a week for 4-6 weeks. Pt is not sure if he needs a referral. Pt does not have an appointment scheduled for physical therapy. Please, call pt with any questions.

## 2022-07-20 ENCOUNTER — TELEPHONE (OUTPATIENT)
Dept: PHYSICAL MEDICINE AND REHAB | Facility: CLINIC | Age: 72
End: 2022-07-20

## 2022-07-25 ENCOUNTER — PATIENT MESSAGE (OUTPATIENT)
Dept: PHYSICAL MEDICINE AND REHAB | Facility: CLINIC | Age: 72
End: 2022-07-25

## 2022-07-25 DIAGNOSIS — M54.16 ACUTE LEFT LUMBAR RADICULOPATHY: Primary | ICD-10-CM

## 2022-07-26 NOTE — TELEPHONE ENCOUNTER
Patient states that Tylenol 3, Gabapentin 300 mg and Meloxicam are not providing relief. Would like a different rx to provide relief. C/o L low back pain to posterior leg into foot with NT and weakness. Pain worsens when standing or laying prolonged periods of time. Pain alleviated when soaking in hot water. Please advise.

## 2022-07-26 NOTE — TELEPHONE ENCOUNTER
From: Ruchi Erickson  To: Dinah Reyes DO  Sent: 7/25/2022 10:50 PM CDT  Subject: Pain     Dr. Grecia Mchugh and Dr. Harman Reyes  I am experiencing a lot of pain and I have learned the Tylenol three and even gabapentin does not give me the relief I need on a daily basis. Please look at my medical chart and prescribe me something that will alleviate the pain.  I do not begin therapy until September which means I will be experiencing this discomfort the entire month of August.  Yours truly [employer]

## 2022-07-27 RX ORDER — GABAPENTIN 800 MG/1
800 TABLET ORAL 3 TIMES DAILY
Qty: 90 TABLET | Refills: 0 | Status: SHIPPED | OUTPATIENT
Start: 2022-07-27

## 2022-07-27 RX ORDER — TRAMADOL HYDROCHLORIDE 50 MG/1
50 TABLET ORAL EVERY 6 HOURS PRN
Qty: 30 TABLET | Refills: 0 | Status: SHIPPED | OUTPATIENT
Start: 2022-07-27

## 2022-07-27 NOTE — TELEPHONE ENCOUNTER
Spoke with patient over the phone. I have recommended starting tramadol 50 mg as needed. I have also advised him to increase the gabapentin dosage to 800 mg 3 times daily which was ordered for him. I have advised him to obtain CT imaging of the lumbar spine as he cannot obtain MRI imaging due to his stents. Advised him to follow-up after CT imaging is completed so we can discuss neck steps in treatment including epidural injection.     Candice Cunningham DO, FAAPMR & CAQSM  Physical Medicine and Rehabilitation/Sports Medicine  [facility]

## 2022-07-29 ENCOUNTER — HOSPITAL ENCOUNTER (OUTPATIENT)
Dept: GENERAL RADIOLOGY | Facility: HOSPITAL | Age: 72
Discharge: HOME | End: 2022-07-29
Attending: PHYSICAL MEDICINE & REHABILITATION
Payer: MEDICARE

## 2022-07-29 DIAGNOSIS — M54.16 ACUTE LEFT LUMBAR RADICULOPATHY: ICD-10-CM

## 2022-07-29 PROCEDURE — 72114 X-RAY EXAM L-S SPINE BENDING: CPT | Performed by: PHYSICAL MEDICINE & REHABILITATION

## 2022-08-03 ENCOUNTER — HOSPITAL ENCOUNTER (OUTPATIENT)
Dept: CT IMAGING | Age: 72
Discharge: HOME | End: 2022-08-03
Attending: PHYSICAL MEDICINE & REHABILITATION
Payer: MEDICARE

## 2022-08-03 DIAGNOSIS — M54.16 ACUTE LEFT LUMBAR RADICULOPATHY: ICD-10-CM

## 2022-08-03 PROCEDURE — 72131 CT LUMBAR SPINE W/O DYE: CPT | Performed by: PHYSICAL MEDICINE & REHABILITATION

## 2022-08-04 DIAGNOSIS — C43.70 MALIGNANT MELANOMA OF LOWER EXTREMITY, INCLUDING HIP, UNSPECIFIED LATERALITY (HCC): ICD-10-CM

## 2022-08-04 RX ORDER — CYCLOBENZAPRINE HCL 10 MG
TABLET ORAL
Qty: 30 TABLET | Refills: 1 | OUTPATIENT
Start: 2022-08-04

## 2022-08-04 RX ORDER — ACETAMINOPHEN AND CODEINE PHOSPHATE 300; 30 MG/1; MG/1
TABLET ORAL
Qty: 50 TABLET | Refills: 0 | Status: SHIPPED | OUTPATIENT
Start: 2022-08-04

## 2022-08-07 ENCOUNTER — HOSPITAL ENCOUNTER (EMERGENCY)
Facility: HOSPITAL | Age: 72
Discharge: HOME | End: 2022-08-07
Attending: EMERGENCY MEDICINE
Payer: MEDICARE

## 2022-08-07 VITALS
HEART RATE: 74 BPM | HEIGHT: 74 IN | TEMPERATURE: 97 F | WEIGHT: 250 LBS | BODY MASS INDEX: 32.08 KG/M2 | SYSTOLIC BLOOD PRESSURE: 108 MMHG | DIASTOLIC BLOOD PRESSURE: 70 MMHG | RESPIRATION RATE: 20 BRPM | OXYGEN SATURATION: 97 %

## 2022-08-07 DIAGNOSIS — M54.16 LUMBAR RADICULOPATHY: Primary | ICD-10-CM

## 2022-08-07 PROCEDURE — 96374 THER/PROPH/DIAG INJ IV PUSH: CPT

## 2022-08-07 PROCEDURE — 99284 EMERGENCY DEPT VISIT MOD MDM: CPT

## 2022-08-07 PROCEDURE — 96375 TX/PRO/DX INJ NEW DRUG ADDON: CPT

## 2022-08-07 RX ORDER — MORPHINE SULFATE 2 MG/ML
2 INJECTION, SOLUTION INTRAMUSCULAR; INTRAVENOUS ONCE
Status: COMPLETED | OUTPATIENT
Start: 2022-08-07 | End: 2022-08-07

## 2022-08-07 RX ORDER — METHYLPREDNISOLONE SODIUM SUCCINATE 125 MG/2ML
80 INJECTION, POWDER, LYOPHILIZED, FOR SOLUTION INTRAMUSCULAR; INTRAVENOUS ONCE
Status: COMPLETED | OUTPATIENT
Start: 2022-08-07 | End: 2022-08-07

## 2022-08-07 RX ORDER — METHYLPREDNISOLONE 4 MG/1
TABLET ORAL
Qty: 21 TABLET | Refills: 0 | Status: SHIPPED | OUTPATIENT
Start: 2022-08-07

## 2022-08-07 RX ORDER — DIAZEPAM 5 MG/ML
2.5 INJECTION, SOLUTION INTRAMUSCULAR; INTRAVENOUS ONCE
Status: COMPLETED | OUTPATIENT
Start: 2022-08-07 | End: 2022-08-07

## 2022-08-07 RX ORDER — DIAZEPAM 2 MG/1
2 TABLET ORAL 3 TIMES DAILY PRN
Qty: 15 TABLET | Refills: 0 | Status: SHIPPED | OUTPATIENT
Start: 2022-08-07 | End: 2022-08-12

## 2022-08-07 RX ORDER — HYDROCODONE BITARTRATE AND ACETAMINOPHEN 5; 325 MG/1; MG/1
1 TABLET ORAL EVERY 6 HOURS PRN
Qty: 10 TABLET | Refills: 0 | Status: SHIPPED | OUTPATIENT
Start: 2022-08-07

## 2022-08-07 NOTE — ED INITIAL ASSESSMENT (HPI)
Pt from home with worsening sciatic pain on left side x 2 days since he ran out of his tramadol prescription.

## 2022-08-08 ENCOUNTER — TELEPHONE (OUTPATIENT)
Dept: NEUROLOGY | Facility: CLINIC | Age: 72
End: 2022-08-08

## 2022-08-08 ENCOUNTER — TELEMEDICINE (OUTPATIENT)
Dept: PHYSICAL MEDICINE AND REHAB | Facility: CLINIC | Age: 72
End: 2022-08-08

## 2022-08-08 DIAGNOSIS — M54.16 ACUTE LEFT LUMBAR RADICULOPATHY: Primary | ICD-10-CM

## 2022-08-08 RX ORDER — TRAMADOL HYDROCHLORIDE 50 MG/1
TABLET ORAL
Qty: 30 TABLET | Refills: 0 | Status: SHIPPED | OUTPATIENT
Start: 2022-08-08

## 2022-08-08 NOTE — TELEPHONE ENCOUNTER
Refill Request    Medication request: traMADol 50 MG Oral Tab    LOV:7/14/2022 Benjamin Ridley DO   Due back to clinic per last office note:  [facility]  NOV: 8/8/2022 Benjamin Ridley DO      [place]: 7/27/22 #30    Urine drug screen (if applicable): None  Pain contract: None    LOV plan (if weaning or changing medications): N/A

## 2022-08-08 NOTE — TELEPHONE ENCOUNTER
Patient has been scheduled for Caudal Epidural Steroid Injection under fluoroscopy  on 8/9/22 at the [facility] with Dr. Carloz Lan.   -Anesthesia type: Local.  -If receiving MAC or IVC sedation patient will need to get COVID tested 3 days prior even if already vaccinated (order placed by [facility].)  -If scheduling [facility] covid testing required for all procedures whether patient is vaccinated or not. -Patient informed not to eat or drink anything after midnight the night prior to the procedure, if being sedated. -Patient was advised that if he/she does receive the covid vaccine it needs to be at least 2 weeks before or after the injection. -Medications and allergies reviewed. -Patient reminded to hold NSAIDs (Ibuprofen, ASA 81, Aleve, Naproxen, Mobic etc.) for 3 days prior to [place]  if BMI is greater than 35. For Cervical injections only hold multivitamins, Vitamin E, Fish Oil, Phentermine (Lomaira) for 7 days prior to injection and NSAIDS.  mg to be held for 7 days prior to injections.  -If patient is receiving MAC/IVCS Phentermine Leonid Romberg) will need to be held for 7 days prior to injection.  -If on blood thinner clearance has been received to hold this medication by provider.   -Patient informed he/she will need a  to and from procedure. -[facility] is located in the [facility] 1st floor. Patient may park in the yellow parking. Patient verbalized understanding and agrees with plan.  -----> Scheduled in Epic: Yes  -----> Scheduled in [employer]:  Yes

## 2022-08-08 NOTE — TELEPHONE ENCOUNTER
This [employer] Medicare [employer]vantage members plan does not currently require a prior authorization for these services Caudal Epidural Steroid Injection under fluoroscopy cpt codes 67763. [id number]   Decision ID #:[id number]. Will inform Nursing.

## 2022-08-09 ENCOUNTER — OFFICE VISIT (OUTPATIENT)
Dept: SURGERY | Facility: CLINIC | Age: 72
End: 2022-08-09

## 2022-08-09 DIAGNOSIS — M54.16 LUMBAR RADICULOPATHY, CHRONIC: Primary | ICD-10-CM

## 2022-08-09 PROCEDURE — 62323 NJX INTERLAMINAR LMBR/SAC: CPT | Performed by: PHYSICAL MEDICINE & REHABILITATION

## 2022-08-09 NOTE — PROCEDURES
Caudal Epidural steroid injection under fluoroscopy    Dx: lumbar radiculopathy    Description: Risks and benefits discussed, patient agreed to proceed. Informed consent obtained prior to procedure. The patient was positioned prone on the fluoroscopy table with a pillow placed underneath the lower abdomen. Noninvasive monitors including pulse oximeter, blood pressure cuff and EKG leads were placed. A sterile prep with chloroprep and drape at the site of injection was performed. The C arm was oriented to see the lateral view. Landmarks were identifuied using fluoroscopy. Next, using lidocaine 1% solution the soft tissue was infiltrated at the site of intended needle puncture. A 22 gauge Quincke spinal needle was introduced into the caudal canal using fluoroscopic guidance. AP/lateral fluoroscopy was used to verify positioning. The stylet was removed and after negative aspiration for blood and CSF radiopaque dye was injected which demonstrated epidural spread without vascular spread. Subsequently, 15cc of solution containing 5cc of 1%Lidocaine, 7cc of normal saline and 3cc of 6mg/ml of Celestone was injected after negative aspiration. The needle was then withdrawn. Recovery: Patient was monitored in recovery with regular VS, pulse ox monitoring. Patent was moving all extremities and able to ambulate, and patient was given discharge instructions and discharge in care of a family member/friend in stable condition. Follow up in 2 weeks for post-procedure evaluation.     Scott Alexander DO, FAAPMR & CAQSM  Physical Medicine and Rehabilitation/Sports Medicine  [facility] [place]

## 2022-08-12 ENCOUNTER — PATIENT MESSAGE (OUTPATIENT)
Dept: PHYSICAL MEDICINE AND REHAB | Facility: CLINIC | Age: 72
End: 2022-08-12

## 2022-08-12 RX ORDER — DIAZEPAM 2 MG/1
2 TABLET ORAL 3 TIMES DAILY PRN
Qty: 15 TABLET | Refills: 0 | Status: SHIPPED | OUTPATIENT
Start: 2022-08-12 | End: 2022-08-19

## 2022-08-12 NOTE — TELEPHONE ENCOUNTER
Refill Request    Medication request: diazePAM 2 MG Oral Tab    LOV:8/9/22 Leonora Howe, DO   Due back to clinic per last office note:  post injection  NOV: 8/30/2022 Leonora Bessie, [place]      [place]: 8/7/22 #15    Urine drug screen (if applicable): None  Pain contract: None    LOV plan (if weaning or changing medications): N/A.

## 2022-08-12 NOTE — TELEPHONE ENCOUNTER
From: Mildred Calixto  To: Luiza Puente.  Mary Turner DO  Sent: 8/12/2022 11:56 AM CDT  Subject: Refill     Diazepam 2mg tablets

## 2022-08-16 DIAGNOSIS — N40.0 BENIGN PROSTATIC HYPERPLASIA, UNSPECIFIED WHETHER LOWER URINARY TRACT SYMPTOMS PRESENT: ICD-10-CM

## 2022-08-16 RX ORDER — TAMSULOSIN HYDROCHLORIDE 0.4 MG/1
CAPSULE ORAL
Qty: 90 CAPSULE | Refills: 1 | Status: SHIPPED | OUTPATIENT
Start: 2022-08-16

## 2022-08-20 ENCOUNTER — PATIENT MESSAGE (OUTPATIENT)
Dept: PHYSICAL MEDICINE AND REHAB | Facility: CLINIC | Age: 72
End: 2022-08-20

## 2022-08-20 RX ORDER — TRAMADOL HYDROCHLORIDE 50 MG/1
TABLET ORAL
Qty: 30 TABLET | Refills: 0 | Status: CANCELLED | OUTPATIENT
Start: 2022-08-20

## 2022-08-22 RX ORDER — GABAPENTIN 800 MG/1
TABLET ORAL
Qty: 90 TABLET | Refills: 0 | Status: SHIPPED | OUTPATIENT
Start: 2022-08-22

## 2022-08-22 RX ORDER — TRAMADOL HYDROCHLORIDE 50 MG/1
TABLET ORAL
Qty: 30 TABLET | Refills: 0 | Status: SHIPPED | OUTPATIENT
Start: 2022-08-22

## 2022-08-22 RX ORDER — GABAPENTIN 800 MG/1
TABLET ORAL
Qty: 90 TABLET | Refills: 0 | Status: CANCELLED | OUTPATIENT
Start: 2022-08-22

## 2022-08-22 NOTE — TELEPHONE ENCOUNTER
From: Theodora Torres  To: Daylene Koyanagi. Haider Rao DO  Sent: 8/20/2022 8:46 AM CDT  Subject: Pain    Dear doctor I am in so much pain and none of the medications quite the pain for very long please,please help me.

## 2022-08-22 NOTE — TELEPHONE ENCOUNTER
See refill encounter 8/20/22 for patient update. Scheduled for sooner appointment d/t severe pain - now scheduled for 8/24/2022. Will done encounter for now.

## 2022-08-22 NOTE — TELEPHONE ENCOUNTER
S/w patient in regards to patient message sent. Patient states that the Tramadol is only effective for about 1 hour, pt reporting he is taking Gabapentin as scheduled (see 8/22/2022). Pt reports severe pain remains to LEFT glute, constant with intermittent shooting pains (positional). RIGHT sided pain pt reports <50% relief, pt not able to give specific percentage of relief at this time.      Rescheduled patient for sooner for follow-up appointment d/t severe pain - 8/24/2022 at 10:40AM.

## 2022-08-22 NOTE — TELEPHONE ENCOUNTER
Refill Request    Medication request: gabapentin 800 MG Oral Tab. Take 1 tablet (800 mg total) by mouth 3 (three) times daily. Santino CRUZ,    Due back to clinic per last office note: Per Dr. Dang Vidales: \"Follow up in 2 weeks for post-procedure evaluation. If he does not have sufficient improvement with this plan, he may need neurosurgical evaluation. \"  NOV: 8/30/2022 Jarred Peak, [place]      [place]: 07/27/2022 #90    Urine drug screen (if applicable): none  Pain contract: none    LOV plan (if weaning or changing medications): Per Dr. Dang Vidales: \"I have advised him to continue his current medications. \"      See refill encounter 8/20/22 for patient update.

## 2022-08-22 NOTE — TELEPHONE ENCOUNTER
Refill Request    Medication request: TRAMADOL 50 MG Oral Tab. TAKE 1 TABLET(50 MG) BY MOUTH EVERY 6 HOURS AS NEEDED FOR PAIN.     LOV: 08/08/2022 - TELEMEDICINE; (08/09/2022 [facility] - Caudal Epidural steroid injection) Gonsalo Aguiar DO   Due back to clinic per last office note:  Per Dr. Suzi Dakins: \"Follow up in 2 weeks for post-procedure evaluation. If he does not have sufficient improvement with this plan, he may need neurosurgical evaluation. \"  NOV: 8/30/2022 Gonsalo Aguiar DO      [place]: 08/08/2022 [place]30 [place] U[place] drug screen (if applicable): none  Pain contract: none    LOV plan (if weaning or changing medications): Per Dr. Suzi Dakins: \"I have advised him to continue his current medications. \"      Per patient message on 08/20/2022 to Dr. Suzi Dakins: \"Dear doctor I am in so much pain and none of the medications quite the pain for very long please,please help me. \"

## 2022-08-23 ENCOUNTER — HOSPITAL ENCOUNTER (EMERGENCY)
Facility: HOSPITAL | Age: 72
Discharge: HOME | End: 2022-08-23
Attending: EMERGENCY MEDICINE
Payer: MEDICARE

## 2022-08-23 VITALS
OXYGEN SATURATION: 96 % | TEMPERATURE: 98 F | HEART RATE: 80 BPM | SYSTOLIC BLOOD PRESSURE: 124 MMHG | DIASTOLIC BLOOD PRESSURE: 67 MMHG | RESPIRATION RATE: 16 BRPM

## 2022-08-23 DIAGNOSIS — G89.29 CHRONIC LEFT-SIDED LOW BACK PAIN WITHOUT SCIATICA: Primary | ICD-10-CM

## 2022-08-23 DIAGNOSIS — M54.16 LUMBAR RADICULOPATHY: ICD-10-CM

## 2022-08-23 DIAGNOSIS — M54.50 CHRONIC LEFT-SIDED LOW BACK PAIN WITHOUT SCIATICA: Primary | ICD-10-CM

## 2022-08-23 PROCEDURE — 99283 EMERGENCY DEPT VISIT LOW MDM: CPT

## 2022-08-23 PROCEDURE — 96372 THER/PROPH/DIAG INJ SC/IM: CPT

## 2022-08-23 RX ORDER — MORPHINE SULFATE 4 MG/ML
8 INJECTION, SOLUTION INTRAMUSCULAR; INTRAVENOUS ONCE
Status: COMPLETED | OUTPATIENT
Start: 2022-08-23 | End: 2022-08-23

## 2022-08-23 RX ORDER — TRAMADOL HYDROCHLORIDE 50 MG/1
50 TABLET ORAL EVERY 8 HOURS PRN
Qty: 10 TABLET | Refills: 0 | Status: SHIPPED | OUTPATIENT
Start: 2022-08-23

## 2022-08-23 NOTE — ED INITIAL ASSESSMENT (HPI)
Patient presents with left side back pain since last night and right leg pain(chronic). Patient reports recent spinal surgery, but pain has been going on in legs prior to surgery.

## 2022-08-23 NOTE — ED QUICK NOTES
Pt states chronic rt leg pain from recent spinal surgery. States having lower left back starting last night. States has an appt with physiatry tomorrow, but pain is worse today.

## 2022-08-24 ENCOUNTER — HOSPITAL ENCOUNTER (OUTPATIENT)
Dept: GENERAL RADIOLOGY | Age: 72
Discharge: HOME | End: 2022-08-24
Attending: PHYSICAL MEDICINE & REHABILITATION
Payer: MEDICARE

## 2022-08-24 ENCOUNTER — OFFICE VISIT (OUTPATIENT)
Dept: PHYSICAL MEDICINE AND REHAB | Facility: CLINIC | Age: 72
End: 2022-08-24
Payer: COMMERCIAL

## 2022-08-24 ENCOUNTER — TELEPHONE (OUTPATIENT)
Dept: NEUROLOGY | Facility: CLINIC | Age: 72
End: 2022-08-24

## 2022-08-24 VITALS
HEIGHT: 74 IN | DIASTOLIC BLOOD PRESSURE: 72 MMHG | HEART RATE: 77 BPM | BODY MASS INDEX: 31.83 KG/M2 | WEIGHT: 248 LBS | OXYGEN SATURATION: 97 % | SYSTOLIC BLOOD PRESSURE: 128 MMHG

## 2022-08-24 DIAGNOSIS — I10 ESSENTIAL HYPERTENSION: ICD-10-CM

## 2022-08-24 DIAGNOSIS — M99.08 RIB CAGE REGION SOMATIC DYSFUNCTION: Primary | ICD-10-CM

## 2022-08-24 DIAGNOSIS — M47.816 LUMBAR FACET ARTHROPATHY: ICD-10-CM

## 2022-08-24 DIAGNOSIS — M99.08 RIB CAGE REGION SOMATIC DYSFUNCTION: ICD-10-CM

## 2022-08-24 DIAGNOSIS — C61 PROSTATE CANCER (HCC): ICD-10-CM

## 2022-08-24 DIAGNOSIS — I25.2 HISTORY OF MI (MYOCARDIAL INFARCTION): ICD-10-CM

## 2022-08-24 PROCEDURE — 71100 X-RAY EXAM RIBS UNI 2 VIEWS: CPT | Performed by: PHYSICAL MEDICINE & REHABILITATION

## 2022-08-24 PROCEDURE — 3074F SYST BP LT 130 MM HG: CPT | Performed by: PHYSICAL MEDICINE & REHABILITATION

## 2022-08-24 PROCEDURE — 3008F BODY MASS INDEX DOCD: CPT | Performed by: PHYSICAL MEDICINE & REHABILITATION

## 2022-08-24 PROCEDURE — 3078F DIAST BP <80 MM HG: CPT | Performed by: PHYSICAL MEDICINE & REHABILITATION

## 2022-08-24 PROCEDURE — 99214 OFFICE O/P EST MOD 30 MIN: CPT | Performed by: PHYSICAL MEDICINE & REHABILITATION

## 2022-08-24 NOTE — TELEPHONE ENCOUNTER
This [employer] Medicare [employer]vantage members plan does not currently require a prior authorization for these services   Bilateral L3-4, L4-5, L5-S1 Facet joint injection under fluoroscopy  cpt codes J507292, T422262, [id number], 44793. Decision ID #:[id number]. Will inform Nursing.

## 2022-08-24 NOTE — TELEPHONE ENCOUNTER
BMI: 31.84 kg/m2: NO restrictions with injection. Procedure: Bilateral L3-4, L4-5, L5-S1 Facet joint injection  Anesthesia: Local  Dx: K21.471  Location: [address] [place] [address]  CPT Codes: U5704662, [id number], [id number], [id number].

## 2022-08-25 DIAGNOSIS — K21.9 GASTROESOPHAGEAL REFLUX DISEASE: ICD-10-CM

## 2022-08-25 DIAGNOSIS — I10 ESSENTIAL HYPERTENSION: ICD-10-CM

## 2022-08-26 RX ORDER — PANTOPRAZOLE SODIUM 40 MG/1
40 TABLET, DELAYED RELEASE ORAL
Qty: 90 TABLET | Refills: 1 | Status: SHIPPED | OUTPATIENT
Start: 2022-08-26

## 2022-08-26 RX ORDER — LOSARTAN POTASSIUM 100 MG/1
100 TABLET ORAL DAILY
Qty: 90 TABLET | Refills: 1 | Status: SHIPPED | OUTPATIENT
Start: 2022-08-26

## 2022-08-26 RX ORDER — METOPROLOL SUCCINATE 100 MG/1
150 TABLET, EXTENDED RELEASE ORAL DAILY
Qty: 135 TABLET | Refills: 1 | Status: SHIPPED | OUTPATIENT
Start: 2022-08-26

## 2022-08-26 RX ORDER — POTASSIUM CHLORIDE 750 MG/1
10 TABLET, EXTENDED RELEASE ORAL DAILY
Qty: 90 TABLET | Refills: 1 | Status: SHIPPED | OUTPATIENT
Start: 2022-08-26

## 2022-08-26 RX ORDER — FUROSEMIDE 40 MG/1
40 TABLET ORAL EVERY MORNING
Qty: 90 TABLET | Refills: 1 | Status: SHIPPED | OUTPATIENT
Start: 2022-08-26

## 2022-08-26 NOTE — TELEPHONE ENCOUNTER
Please review. Protocol failed / No protocol. Requested Prescriptions   Pending Prescriptions Disp Refills    POTASSIUM CHLORIDE 10 MEQ Oral Tab CR [Pharmacy Med Name: Potassium Chloride Elizabeth ER 10 MEQ Oral Tablet Extended Release] 90 tablet 3     Sig: TAKE 1 TABLET BY MOUTH  DAILY        There is no refill protocol information for this order        FUROSEMIDE 40 MG Oral Tab [Pharmacy Med Name: Furosemide 40 MG Oral Tablet] 90 tablet 3     Sig: TAKE 1 TABLET BY MOUTH IN  THE MORNING        Hypertensive Medications Protocol Passed - 8/25/2022 10:10 PM        Passed - In person appointment in the past 12 or next 3 months       Recent Outpatient Visits              2 days ago Rib cage region somatic dysfunction    [facility], [facility] [place] Ku DO    Office Visit    2 weeks ago Lumbar radiculopathy, chronic    EMG NEURO EOSC Jamal Lee DO    Office Visit    2 weeks ago Acute left lumbar radiculopathy    203 [facility] [place] Aponte DO    Telemedicine    1 month ago Acute left lumbar radiculopathy    203 [facility] Del Brody DO    Office Visit    2 months ago Iron deficiency anemia due to dietary causes    [facility] AND [facility] Hematology Oncology Myra Neves MD    Office Visit     Future Appointments         Provider Department Appt Notes    In 6 days Win Romero MD [address] [place] [id number] [facility] colon cancer screening     In 1 week [place] [address] [facility]. CL  3M    In 1 week MD [facility] Dermatology check spot of concern     In 1 week Beth Garrett DO 3620 [facility], 1050 [facility], [address] Sciatic nerve pain and a fall in the bathtub    In 2 weeks [facility], PT [employer] in [place] [employer]    In 3 weeks 975 [facility], PT [facility] in [facility]    In 3 weeks Andra CRUZ DO EMG NEURO EOSC Bilateral L3-4, L4-5, L5-S1 Facet joint injection    In 3 weeks [employer], [employer], PT [employer] in [facility]    In 3 weeks [employer], [employer], PT [employer] in [place]    In 3 weeks Mauricia Lamp, DO [employer], [facility] [address] [place] [address]     In 1 month [employer], [employer], [address] in [place]    In 1 month [employer], [employer], [address] in [place]    In 1 month [employer], [employer], [address] in [place]    In 1 month [employer], [employer], [address] in [place] [employer]    In 3 months EM 1537 [facility] - Infusion CBC, Iron&TIBS-CMP-FT LAB. PORT FLUSH. CL    In 3 months Jesika Pena MD [facility] Hematology Oncology FOLLOW UP VISIT. CL  3m               Passed - Last BP reading less than 140/90     BP Readings from Last 1 Encounters:  08/24/22 : 128/72                Passed - CMP or BMP in past 6 months     Recent Results (from the past 4392 hour(s))   Basic Metabolic Panel (8)    Collection Time: 06/22/22  6:53 PM   Result Value Ref Range    Glucose 100 (H) 70 - 99 mg/dL    Sodium 139 136 - 145 mmol/L    Potassium 3.4 (L) 3.5 - 5.1 mmol/L    Chloride 103 98 - 112 mmol/L    CO2 29.0 21.0 - 32.0 mmol/L    Anion Gap 7 0 - 18 mmol/L    BUN 13 7 - 18 mg/dL    Creatinine 1.00 0.70 - 1.30 mg/dL    BUN/CREA Ratio 13.0 10.0 - 20.0    Calcium, Total 9.1 8.5 - 10.1 mg/dL    Calculated Osmolality 288 275 - 295 mOsm/kg    GFR, Non- 74 >=60    GFR, -American 86 >=60     *Note: Due to a large number of results and/or encounters for the requested time period, some results have not been displayed. A complete set of results can be found in Results Review.                  Passed - In person appointment or virtual visit in the past 6 months       Recent Outpatient Visits              2 days ago Rib cage region somatic dysfunction    [facility] [place], [address] [place], [place], [place]    Office Visit    2 weeks ago Lumbar radiculopathy, chronic    EMG NEURO EOSC Santi Lee,     Office Visit    2 weeks ago Acute left lumbar radiculopathy    203 [facility] [place], [address]    1 month ago Acute left lumbar radiculopathy    203 [facility] Jessica Leminor, DO    Office Visit    2 months ago Iron deficiency anemia due to dietary causes    [facility] AND [facility] Hematology Oncology Vanessa Templeton MD    Office Visit     Future Appointments         Provider Department Appt Notes    In 6 days Spencer Mata MD 3620 [facility], 602 [facility] colon cancer screening     In 1 week [place] [address] [facility]. CL  3M    In 1 week Kory Narvaez MD [facility] Dermatology check spot of concern     In 1 week Andree Schreiber DO 3620 [facility], [address], [facility] Sciatic nerve pain and a fall in the bathtub    In 2 weeks [place], [address] in [employer]    In 3 weeks [facility] & [address] in [employer]    In 3 weeks Jessica Horta DO EMG NEURO EOSC Bilateral L3-4, L4-5, L5-S1 Facet joint injection    In 3 weeks [facility], PT [employer] in [employer]    In 3 weeks [employer], [place], PT [employer] in [employer]    In 3 weeks Jessica Nevaehather, [facility], [place] 4 [facility]     In 1 month Yasmany [place], [address] in [employer]    In 1 month Yasmany [place], [address] in [employer]    In 1 month Yasmany, Emma, PT [employer] in [facility]    In 1 month [facility], PT [employer] in [employer]    In 3 months EM 1537 [facility] - Infusion CBC, Iron&TIBS-CMP-FT LAB. PORT FLUSH. CL    In 3 months Sumanth Salazar MD [facility] Hematology Oncology FOLLOW UP VISIT. CL  3m               Passed - GFR > 50     No results found for: EGFRCR                 PANTOPRAZOLE 40 MG Oral Tab EC [Pharmacy Med Name: Pantoprazole Sodium 40 MG Oral Tablet Delayed Release] 90 tablet 3     Sig: TAKE 1 TABLET BY MOUTH  BEFORE BREAKFAST        Gastrointestional Medication Protocol Passed - 8/25/2022 10:10 PM        Passed - In person appointment or virtual visit in the past 12 mos or appointment in next 3 mos       Recent Outpatient Visits              2 days ago Rib cage region somatic dysfunction    ANA Esquivel, [address] [facility] [place] Ku DO    Office Visit    2 weeks ago Lumbar radiculopathy, chronic    EMG NEURO EOSC Jarred Goldstein DO    Office Visit    2 weeks ago Acute left lumbar radiculopathy    203 [facility] [place], [address]    1 month ago Acute left lumbar radiculopathy    203 [facility] Jarred Goldstein DO    Office Visit    2 months ago Iron deficiency anemia due to dietary causes    [facility] Hematology Oncology Sumanth Salazar MD    Office Visit     Future Appointments         Provider Department Appt Notes    In 6 days Branden Johnson MD [employer], [facility], 602 [facility] colon cancer screening     In 1 [place] [address] [facility]. CL  3M    In 1 week MD JAZZY [facility] Dermatology check spot of concern     In 1 week Sarah Veras DO [employer], [facility], 1050 [facility], [address] Sciatic nerve pain and a fall in the bathtub    In 2 weeks [facility], [address] in [employer]    In 3 weeks [facility], [address] in [employer]    In 3 weeks Glory CRUZ DO EMG NEURO EOSC Bilateral L3-4, L4-5, L5-S1 Facet joint injection    In 3 weeks [facility],  [address] in [email or web address] [facility]    In 3 weeks [facility],  [address] in [place] [email or web address] [facility]    In 3 weeks DO ANA Vieira, [place] [address]     In 1 month [facility], [address] in [employer]    In 1 month [facility], y [address] & [address] in [employer]    In 1 month [facility], y [address] & [address] in [employer]    In 1 month [facility], y [address] & [address]krishna. [address] in [place] Medicare    In 3 months EM [address] - Infusion CBC, Iron&TIBS-CMP-FT LAB. PORT FLUSH. CL    In 3 months Lonnie Hurtado MD [facility] AND CLINICS Hematology Oncology FOLLOW UP VISIT. CL  3m                  LOSARTAN 100 MG Oral Tab [Pharmacy Med Name: Losartan Potassium 100 MG Oral Tablet] 90 tablet 3     Sig: TAKE 1 TABLET BY MOUTH  DAILY        Hypertensive Medications Protocol Passed - 8/25/2022 10:10 PM        Passed - In person appointment in the past 12 or next 3 months       Recent Outpatient Visits              2 days ago Rib cage region somatic dysfunction    ANA Esquivel, 200 [facility] [place], DO [place]    Office Visit    2 weeks ago Lumbar radiculopathy, chronic    EMG NEURO EOSC Tanoctavio [place] DO    Office Visit    2 weeks ago Acute left lumbar radiculopathy    203 [facility], [address]    1 month ago Acute left lumbar radiculopathy    203 [facility], [employer], Leatha Brasher DO    Office Visit    2 months ago Iron deficiency anemia due to dietary causes    [facility] Hematology Oncology Calderon Brunner MD    Office Visit     Future Appointments         Provider Department Appt Notes    In 6 days Ava Geiger MD [employer], [facility], 602 [facility], [place] colon cancer screening     In 1 week EM [address] - Infusion PORT FLUSH. CL  3M    In 1 week [place]ks[place] Martell [place] [address] Dermatology check spot of concern     In 1 week Cynthia Flores DO [place] [employer], [facility], [address], [facility] Sciatic nerve pain and a fall in the bathtub    In 2 weeks [facility], y [address] in [place]    In 3 weeks [address] [place] [address], y [address] & [address] in [place]    In 3 weeks [facility]  EMG NEURO EOSC Bilateral L3-4, L4-5, L5-S1 Facet joint injection    In 3 weeks [facility], PT [employer] in [employer]    In 3 weeks [address] [place] [address], PT [employer] in [place]    In 3 weeks [facility], [address], [place] 4 [address]     In 1 month [facility], [address] in [place]    In 1 month [facility], [address] & [address] in [place]    In 1 month [address] [place] [address], [address] in [place]    In 1 month [address] [place] [address], y [address] & [address] in [place] Medicare    In 3 months EM [address] - Infusion CBC, Iron&TIBS-CMP-FT LAB. PORT FLUSH. CL    In 3 months Calderon Brunner MD [facility] Hematology Oncology FOLLOW UP VISIT. CL  3m               Passed - Last BP reading less than 140/90     BP Readings from Last 1 Encounters:  08/24/22 : 128/72                Passed - CMP or BMP in past 6 months     Recent Results (from the past 4392 hour(s))   Basic Metabolic Panel (8) Collection Time: 06/22/22  6:53 PM   Result Value Ref Range    Glucose 100 (H) 70 - 99 mg/dL    Sodium 139 136 - 145 mmol/L    Potassium 3.4 (L) 3.5 - 5.1 mmol/L    Chloride 103 98 - 112 mmol/L    CO2 29.0 21.0 - 32.0 mmol/L    Anion Gap 7 0 - 18 mmol/L    BUN 13 7 - 18 mg/dL    Creatinine 1.00 0.70 - 1.30 mg/dL    BUN/CREA Ratio 13.0 10.0 - 20.0    Calcium, Total 9.1 8.5 - 10.1 mg/dL    Calculated Osmolality 288 275 - 295 mOsm/kg    GFR, Non- 74 >=60    GFR, -American 86 >=60     *Note: Due to a large number of results and/or encounters for the requested time period, some results have not been displayed. A complete set of results can be found in Results Review. Passed - In person appointment or virtual visit in the past 6 months       Recent Outpatient Visits              2 days ago Rib cage region somatic dysfunction    ANA Esquivel, 200 [facility] [place], [place], [place]    Office Visit    2 weeks ago Lumbar radiculopathy, chronic    EMG NEURO EOSC Evelyn Lee DO    Office Visit    2 weeks ago Acute left lumbar radiculopathy    203 [facility], [facility] [address]    1 month ago Acute left lumbar radiculopathy    203 [facility]    Office Visit    2 months ago Iron deficiency anemia due to dietary causes    [facility] Hematology Oncology Moshe Vidales MD    Office Visit     Future Appointments         Provider Department Appt Notes    In 6 days Lexie Ladd MD [facility]20 [facility], 602 [facility] colon cancer screening     In 1 week [place] [address] [facility]. CL  3M    In 1 week MD KAREN [facility] [facility] Dermatology check spot of concern     In 1 week Ann Whitehead DO 3620 [facility], 1050 [facility], [address] Sciatic nerve pain and a fall in the bathtub    In 2 weeks [facility], PT [employer] in [facility]    In 3 weeks [facility], y [address] & [address] in [employer]    In 3 weeks Faizan Robins Y, DO EMG NEURO EOSC Bilateral L3-4, L4-5, L5-S1 Facet joint injection    In 3 weeks itravelonJaman [facility], PT [employer] in [place] [employer]    In 3 weeks Lisandra Connolly, PT [employer] in [place] [employer]    In 3 weeks Deon Alcala, DO ANA Esqu[place], [place] 4 [address] [place] [address]     In 1 month [facility], y [address] & [address] in [place] [employer]    In 1 month [facility], y [address] & [address] in [employer]    In 1 month [address] [place] [address], [facility] 12 & [address]. [address] in [employer]    In 1 month [address] [place] [address], [facility] [address] & [address] in [place] Medicare    In 3 months EM [address] - Infusion CBC, Iron&TIBS-CMP-FT LAB. PORT FLUSH. CL    In 3 months Harrison Guajardo MD [facility] AND CLINICS Hematology Oncology FOLLOW UP VISIT. CL  3m               Passed - GFR > 50     No results found for: EGFRCR                 METOPROLOL SUCCINATE  MG Oral Tablet 24 Hr [Pharmacy Med Name: Metoprolol Succinate  MG Oral Tablet Extended Release 24 Hour] 135 tablet 3     Sig: TAKE 1 AND 1/2 TABLETS BY  MOUTH DAILY        Hypertensive Medications Protocol Passed - 8/25/2022 10:10 PM        Passed - In person appointment in the past 12 or next 3 months       Recent Outpatient Visits              2 days ago Rib cage region somatic dysfunction    ANA [place], [address] [facility] [place],     Office Visit    2 weeks ago Lumbar radiculopathy, chronic    EMG NEURO EOSC Ellie Lee,     Office Visit    2 weeks ago Acute left lumbar radiculopathy    203 [facility] Carloz [place], [place] Telemedicine    1 month ago Acute left lumbar radiculopathy    [address] [facility], [place]    Office Visit    2 months ago Iron deficiency anemia due to dietary causes    [facility] Hematology Oncology Manoj Alatorre MD    Office Visit     Future Appointments         Provider Department Appt Notes    In 6 days Clemencia Villar MD 3620 [facility], 602 [facility] colon cancer screening     In 1 week [place] [address] [facility]. CL  3M    In 1 week Carmita Leonard MD [facility] Dermatology check spot of concern     In 1 week Eran Rivas,  3620 [facility], [place] [address], [facility] Sciatic nerve pain and a fall in the bathtub    In 2 weeks [facility], [place] [address] & [address] in [employer]    In 3 weeks 975 [facility], Esme [address] & [address] in [employer]    In 3 weeks [facility],  EMG NEURO EOSC Bilateral L3-4, L4-5, L5-S1 Facet joint injection    In 3 weeks [facility], PT [employer] in [employer]    In 3 weeks 975 [facility], PT [employer] in [employer]    In 3 weeks [facility], [address], [place] 4 [address] [place] [address]     In 1 month [facility], [address] in [employer]    In 1 month [facility], [place] [address] & [address] in [employer]    In 1 month 975 [facility], [place] [address] & [address] in [employer]    In 1 month 975 [facility], [place] [address] & [address] in [place] Medicare    In 3 months EM 1537 [facility] - Infusion CBC, Iron&TIBS-CMP-FT LAB. PORT FLUSH. CL    In 3 months Manoj Alatorre MD [facility] AND [facility] Hematology Oncology FOLLOW UP VISIT. CL  3m               Passed - Last BP reading less than 140/90     BP Readings from Last 1 Encounters:  08/24/22 : 128/72                Passed - CMP or BMP in past 6 months     Recent Results (from the past 4392 hour(s))   Basic Metabolic Panel (8)    Collection Time: 06/22/22  6:53 PM   Result Value Ref Range    Glucose 100 (H) 70 - 99 mg/dL    Sodium 139 136 - 145 mmol/L    Potassium 3.4 (L) 3.5 - 5.1 mmol/L    Chloride 103 98 - 112 mmol/L    CO2 29.0 21.0 - 32.0 mmol/L    Anion Gap 7 0 - 18 mmol/L    BUN 13 7 - 18 mg/dL    Creatinine 1.00 0.70 - 1.30 mg/dL    BUN/CREA Ratio 13.0 10.0 - 20.0    Calcium, Total 9.1 8.5 - 10.1 mg/dL    Calculated Osmolality 288 275 - 295 mOsm/kg    GFR, Non- 74 >=60    GFR, -American 86 >=60     *Note: Due to a large number of results and/or encounters for the requested time period, some results have not been displayed. A complete set of results can be found in Results Review. Passed - In person appointment or virtual visit in the past 6 months       Recent Outpatient Visits              2 days ago Rib cage region somatic dysfunction    ANA [place], [address] [place], [place], [place]    Office Visit    2 weeks ago Lumbar radiculopathy, chronic    EMG NEURO EOSC Marlo [place] DO    Office Visit    2 weeks ago Acute left lumbar radiculopathy    203 [facility] [place], [address]    1 month ago Acute left lumbar radiculopathy    203 [facility] Mary Auguste DO    Office Visit    2 months ago Iron deficiency anemia due to dietary causes    [facility]ICS Hematology Oncology Mateo Owusu MD    Office Visit     Future Appointments         Provider Department Appt Notes    In 6 days Monica Ascencio MD [facility], 602 [facility] colon cancer screening     In 1 week [place] [address] [facility]. CL  3M    In 1 week J Carlos Soares MD [facility] [address] [place] [address] Dermatology check spot of concern     In 1 week Linda Juárez DO 3620 [facility], 1050 [facility], [address] Sciatic nerve pain and a fall in the bathtub    In 2 weeks [facility] [place], [address] in [address] [place] [address] [employer]    In 3 weeks [address] [place] [address], y [address] & [address] in [address] [place] [address] [employer] [facility]    In 3 weeks Deon Alcala DO EMG NEURO EOSC Bilateral L3-4, L4-5, L5-S1 Facet joint injection    In 3 weeks [facility] [place], PT [employer] in [address] [place] [address] [employer]    In 3 weeks 5 [facility], PT [employer] in [address] [place] [address] [employer]    In 3 weeks Mir Black Dora, [address], [place] 4 [address]     In 1 month [facility], [address] in [address] [place] [address] [employer]    In 1 month 975 [facility], y [address] & [address] in [address] [place] [address] [employer]    In 1 month [address] [place] [address], [address] in [address] [place] [address] [employer]    In 1 month [address] [place] [address], y [address] & [address] in [place] Medicare    In 3 months EM 1537 [facility] - Infusion CBC, Iron&TIBS-CMP-FT LAB. PORT FLUSH. CL    In 3 months Harrison Guajardo MD [facility] Hematology Oncology FOLLOW UP VISIT. CL  3m               Passed - GFR > 50     No results found for: [facility]                  Future Appointments         Provider Department Appt Notes    In 6 days Kelly Waller MD 3620 [place], 602 [facility] [place], [place] colon cancer screening     In 1 week [place] [address] [facility][note 3]. CL  3M    In 1 week Marybelle Najjar, MD [facility] Dermatology check spot of concern     In 1 week Linda Juárez, DO 3620 [facility], [facility], [address] Sciatic nerve pain and a fall in the bathtub    In 2 weeks [facility] [place], PT [employer] in [address] [place] [address] [employer]    In 3 weeks 975 [facility], [address] in [address] [place] [address] [employer] [facility] In 3 weeks Radha Vitale, DO EMG NEURO EOSC Bilateral L3-4, L4-5, L5-S1 Facet joint injection    In 3 weeks BoEmma rivera, PT [employer] in [place]    In 3 weeks [facility], PT [employer] in [employer]    In 3 weeks Leotis Collar, DO ANA Eqsuivel, 221 [facility]     In 1 month Andrea [facility], [address] in [employer]    In 1 month [employer] [place], PT [employer] in [employer]    In 1 month BoCheckpoint SurgicalBaystate [facility], [address]char[address] in [employer]    In 1 month Andrea [facility] [address] in [place] Medicare    In 3 months EM [address] [facility] - Infusion CBC, Iron&TIBS-CMP-FT LAB. PORT FLUSH. CL    In 3 months Spencer Geronimo MD [facility] Hematology Oncology FOLLOW UP VISIT. CL  3m          Recent Outpatient Visits              2 days ago Rib cage region somatic dysfunction    [facility] Es[address]ivel, [address],     Office Visit    2 weeks ago Lumbar radiculopathy, chronic    [facility] Chrissie Guillen DO Rosa    Office Visit    2 weeks ago Acute left lumbar radiculopathy    [address] [facility] Aponte, [address]    1 month ago Acute left lumbar radiculopathy    [facility] Radha Vitale, DO    Office Visit    2 months ago Iron deficiency anemia due to dietary causes    [facility] Hematology Oncology Spencer Geronimo MD    Office Visit

## 2022-08-26 NOTE — TELEPHONE ENCOUNTER
Patient has been scheduled for Bilateral L3-4, L4-5, L5-S1 Facet joint injection on 9/12/22 at the [address] [place] [address] with Dr. Yesi Ingram.   -Anesthesia type: Local.  -If scheduling [facility] covid testing required for all procedures whether patient is vaccinated or not. -Patient informed not to eat or drink anything after midnight the night prior to the procedure, if being sedated. -Patient was advised that if he/she does receive the covid vaccine it needs to be at least 2 weeks before or after the injection. -Medications and allergies reviewed. -Patient reminded to hold NSAIDs (Ibuprofen, ASA 81, Aleve, Naproxen, Mobic, Diclofenac, Etodolac, Celebrex etc.) for 3 days prior to [facility]  if BMI is greater than 35. For Cervical injections only hold multivitamins, Vitamin E, Fish Oil, Phentermine (Lomaira) for 7 days prior to injection and NSAIDS.  mg to be held for 7 days prior to injections.  -If patient is receiving MAC/IVCS Phentermine Levell Marus) will need to be held for 7 days prior to injection.  -If on blood thinner clearance has been received to hold this medication by provider.   -Patient informed he/she will need a  to and from procedure. -[facility] is located in the [facility] 1st floor. Patient may park in the yellow parking. Patient verbalized understanding and agrees with plan.  -----> Scheduled in Epic: Yes  -----> Scheduled in [employer]:  Yes

## 2022-08-29 ENCOUNTER — APPOINTMENT (OUTPATIENT)
Dept: GENERAL RADIOLOGY | Facility: HOSPITAL | Age: 72
End: 2022-08-29
Attending: EMERGENCY MEDICINE
Payer: MEDICARE

## 2022-08-29 ENCOUNTER — HOSPITAL ENCOUNTER (EMERGENCY)
Facility: HOSPITAL | Age: 72
Discharge: HOME | End: 2022-08-29
Attending: EMERGENCY MEDICINE
Payer: MEDICARE

## 2022-08-29 VITALS
TEMPERATURE: 98 F | RESPIRATION RATE: 22 BRPM | HEART RATE: 92 BPM | OXYGEN SATURATION: 96 % | WEIGHT: 247 LBS | DIASTOLIC BLOOD PRESSURE: 61 MMHG | HEIGHT: 74 IN | SYSTOLIC BLOOD PRESSURE: 101 MMHG | BODY MASS INDEX: 31.7 KG/M2

## 2022-08-29 DIAGNOSIS — M25.561 ACUTE PAIN OF RIGHT KNEE: Primary | ICD-10-CM

## 2022-08-29 LAB
BASOPHILS NFR SNV: 1 %
COLOR FLD: YELLOW
EOSINOPHIL NFR SNV: 0 %
LYMPHOCYTES NFR SNV: 6 %
MONOS+MACROS NFR SNV: 66 %
NEUTROPHILS NFR FLD: 27 %
RBC # FLD AUTO: 8240 /CUMM (ref ?–1)
TOTAL CELLS COUNTED FLD: 100
TOTAL CELLS COUNTED SNV: 307 /CUMM (ref 0–200)
WBC # SNV: 307 /CUMM

## 2022-08-29 PROCEDURE — 89050 BODY FLUID CELL COUNT: CPT | Performed by: EMERGENCY MEDICINE

## 2022-08-29 PROCEDURE — 87205 SMEAR GRAM STAIN: CPT | Performed by: EMERGENCY MEDICINE

## 2022-08-29 PROCEDURE — 20610 DRAIN/INJ JOINT/BURSA W/O US: CPT

## 2022-08-29 PROCEDURE — 89051 BODY FLUID CELL COUNT: CPT | Performed by: EMERGENCY MEDICINE

## 2022-08-29 PROCEDURE — 73560 X-RAY EXAM OF KNEE 1 OR 2: CPT | Performed by: EMERGENCY MEDICINE

## 2022-08-29 PROCEDURE — 87070 CULTURE OTHR SPECIMN AEROBIC: CPT | Performed by: EMERGENCY MEDICINE

## 2022-08-29 PROCEDURE — 89060 EXAM SYNOVIAL FLUID CRYSTALS: CPT | Performed by: EMERGENCY MEDICINE

## 2022-08-29 PROCEDURE — 99284 EMERGENCY DEPT VISIT MOD MDM: CPT

## 2022-08-29 RX ORDER — PREDNISONE 20 MG/1
40 TABLET ORAL DAILY
Qty: 10 TABLET | Refills: 0 | Status: SHIPPED | OUTPATIENT
Start: 2022-08-29 | End: 2022-09-03

## 2022-08-29 RX ORDER — ACETAMINOPHEN AND CODEINE PHOSPHATE 300; 30 MG/1; MG/1
1 TABLET ORAL ONCE
Status: COMPLETED | OUTPATIENT
Start: 2022-08-29 | End: 2022-08-29

## 2022-08-29 RX ORDER — IBUPROFEN 600 MG/1
600 TABLET ORAL ONCE
Status: COMPLETED | OUTPATIENT
Start: 2022-08-29 | End: 2022-08-29

## 2022-08-29 NOTE — ED INITIAL ASSESSMENT (HPI)
Pt states having pain to rt leg making it difficult to walk or dress himself. State was seen in June for same thing. No improvement.  States has not been able to follow up in PMD.

## 2022-08-30 ENCOUNTER — TELEPHONE (OUTPATIENT)
Dept: PHYSICAL MEDICINE AND REHAB | Facility: CLINIC | Age: 72
End: 2022-08-30

## 2022-08-31 NOTE — CM/SW NOTE
Per Smart Contact pt inquiry on timeframe to follow up. CM attempted call no answer and voicemail box is full. Pt needs to call [facility] back to be able to assist patient. CM to remain available for support and/or discharge planning.     Bailey Kennedy RN, [facility]  ER   Ext. [phone]

## 2022-09-01 ENCOUNTER — OFFICE VISIT (OUTPATIENT)
Dept: GASTROENTEROLOGY | Facility: CLINIC | Age: 72
End: 2022-09-01
Payer: COMMERCIAL

## 2022-09-01 ENCOUNTER — TELEPHONE (OUTPATIENT)
Dept: GASTROENTEROLOGY | Facility: CLINIC | Age: 72
End: 2022-09-01

## 2022-09-01 VITALS
HEART RATE: 68 BPM | DIASTOLIC BLOOD PRESSURE: 76 MMHG | SYSTOLIC BLOOD PRESSURE: 122 MMHG | WEIGHT: 223 LBS | BODY MASS INDEX: 28.62 KG/M2 | HEIGHT: 74 IN

## 2022-09-01 DIAGNOSIS — Z80.0 FAMILY HISTORY OF COLON CANCER: ICD-10-CM

## 2022-09-01 DIAGNOSIS — Z86.010 HISTORY OF COLON POLYPS: Primary | ICD-10-CM

## 2022-09-01 DIAGNOSIS — D50.9 IRON DEFICIENCY ANEMIA, UNSPECIFIED IRON DEFICIENCY ANEMIA TYPE: ICD-10-CM

## 2022-09-01 PROCEDURE — 3008F BODY MASS INDEX DOCD: CPT | Performed by: INTERNAL MEDICINE

## 2022-09-01 PROCEDURE — 3074F SYST BP LT 130 MM HG: CPT | Performed by: INTERNAL MEDICINE

## 2022-09-01 PROCEDURE — 3078F DIAST BP <80 MM HG: CPT | Performed by: INTERNAL MEDICINE

## 2022-09-01 PROCEDURE — 99203 OFFICE O/P NEW LOW 30 MIN: CPT | Performed by: INTERNAL MEDICINE

## 2022-09-01 NOTE — TELEPHONE ENCOUNTER
Patient has a history of heart problems      Scheduled for:  Colonoscopy 23958, EGD 27778  Provider Name:  Dr Bharti Bah  Date:  02/02/2023  Location:  [facility]  Sedation:  MAC  Time:  11:00am, arrival 10:00am  Prep: Golytely   Meds/Allergies Reconciled?:  Physician reviewed     Diagnosis with codes: Hx of colon polyps Z86.010   Was patient informed to call insurance with codes (Y/N):  Yes      Referral sent?:  Referral was sent at the time of electronic surgical scheduling. 300 [facility] or [address] [place] [address] notified?:  I sent an electronic request to Endo Scheduling and received a confirmation today. Medication Orders:  Hold Losartan the night before and morning of procedure   Misc Orders:  Patient was informed that they will need a COVID 19 test prior to their procedure. Patient verbally understood & will await a phone call from [facility] to schedule.      Further instructions given by staff: Prep instructions were given to patient

## 2022-09-01 NOTE — PATIENT INSTRUCTIONS
Anemia  History of colon polyps  - colonoscopy and EGD with MAC and Golytely  - medications adjustment as per anesthesia protocol

## 2022-09-02 ENCOUNTER — TELEPHONE (OUTPATIENT)
Dept: HEMATOLOGY/ONCOLOGY | Facility: HOSPITAL | Age: 72
End: 2022-09-02

## 2022-09-02 NOTE — TELEPHONE ENCOUNTER
Called patient to see if he would be willing to reschedule his port flush to another day.   Patient did not answer did leave a message on voicemail

## 2022-09-04 ENCOUNTER — PATIENT MESSAGE (OUTPATIENT)
Dept: PHYSICAL MEDICINE AND REHAB | Facility: CLINIC | Age: 72
End: 2022-09-04

## 2022-09-05 ENCOUNTER — APPOINTMENT (OUTPATIENT)
Dept: CT IMAGING | Facility: HOSPITAL | Age: 72
End: 2022-09-05
Attending: EMERGENCY MEDICINE
Payer: MEDICARE

## 2022-09-05 ENCOUNTER — APPOINTMENT (OUTPATIENT)
Dept: GENERAL RADIOLOGY | Facility: HOSPITAL | Age: 72
End: 2022-09-05
Attending: EMERGENCY MEDICINE
Payer: MEDICARE

## 2022-09-05 ENCOUNTER — HOSPITAL ENCOUNTER (EMERGENCY)
Facility: HOSPITAL | Age: 72
Discharge: HOME | End: 2022-09-05
Attending: EMERGENCY MEDICINE
Payer: MEDICARE

## 2022-09-05 VITALS
SYSTOLIC BLOOD PRESSURE: 121 MMHG | WEIGHT: 226 LBS | RESPIRATION RATE: 18 BRPM | OXYGEN SATURATION: 96 % | BODY MASS INDEX: 29 KG/M2 | DIASTOLIC BLOOD PRESSURE: 67 MMHG | HEART RATE: 60 BPM | TEMPERATURE: 98 F

## 2022-09-05 DIAGNOSIS — M89.9 LESION OF RIGHT FEMUR: Primary | ICD-10-CM

## 2022-09-05 PROCEDURE — 72100 X-RAY EXAM L-S SPINE 2/3 VWS: CPT | Performed by: EMERGENCY MEDICINE

## 2022-09-05 PROCEDURE — 73700 CT LOWER EXTREMITY W/O DYE: CPT | Performed by: EMERGENCY MEDICINE

## 2022-09-05 PROCEDURE — 99284 EMERGENCY DEPT VISIT MOD MDM: CPT

## 2022-09-05 PROCEDURE — 99285 EMERGENCY DEPT VISIT HI MDM: CPT

## 2022-09-05 PROCEDURE — 73560 X-RAY EXAM OF KNEE 1 OR 2: CPT | Performed by: EMERGENCY MEDICINE

## 2022-09-05 RX ORDER — HYDROCODONE BITARTRATE AND ACETAMINOPHEN 5; 325 MG/1; MG/1
1 TABLET ORAL ONCE
Status: COMPLETED | OUTPATIENT
Start: 2022-09-05 | End: 2022-09-05

## 2022-09-05 RX ORDER — HYDROCODONE BITARTRATE AND ACETAMINOPHEN 5; 325 MG/1; MG/1
TABLET ORAL EVERY 6 HOURS PRN
Qty: 10 TABLET | Refills: 0 | Status: SHIPPED | OUTPATIENT
Start: 2022-09-05 | End: 2022-09-12

## 2022-09-05 RX ORDER — ACETAMINOPHEN 325 MG/1
650 TABLET ORAL ONCE
Status: COMPLETED | OUTPATIENT
Start: 2022-09-05 | End: 2022-09-05

## 2022-09-05 NOTE — ED INITIAL ASSESSMENT (HPI)
Pt arrives to ED via [address] [facility] for diarrhea that started 3h ago PTA. Denies abdomen or chest pain. No N/V. No fevers. Pt also c/o of R knee pain after he fell in the tub yesterday morning. Denies any other injuries.

## 2022-09-06 ENCOUNTER — TELEPHONE (OUTPATIENT)
Dept: HEMATOLOGY/ONCOLOGY | Facility: HOSPITAL | Age: 72
End: 2022-09-06

## 2022-09-06 DIAGNOSIS — Z08 ENCOUNTER FOR FOLLOW-UP SURVEILLANCE OF MELANOMA: Primary | ICD-10-CM

## 2022-09-06 DIAGNOSIS — Z85.820 ENCOUNTER FOR FOLLOW-UP SURVEILLANCE OF MELANOMA: Primary | ICD-10-CM

## 2022-09-06 NOTE — TELEPHONE ENCOUNTER
Called and LM on VM to call back- PET scan ordered and needs follow up with Dr. Christal Saba post PET scan.

## 2022-09-07 ENCOUNTER — TELEPHONE (OUTPATIENT)
Dept: HEMATOLOGY/ONCOLOGY | Facility: HOSPITAL | Age: 72
End: 2022-09-07

## 2022-09-07 NOTE — TELEPHONE ENCOUNTER
From: Susannah Caputo  To: Elsi Living. Suzi Dakins, DO  Sent: 9/4/2022 3:43 PM CDT  Subject: Fall in the bathtub     Good morning Dr. Suzi Dakins:   this is Greenwood Dasen Writing to let you know I fell in the bathtub this morning because my Les gave away. I did not go to the ER I am writing you so that you can advise me as to what to do. It is my right leg that I feel most of the pain Please advised.

## 2022-09-07 NOTE — TELEPHONE ENCOUNTER
Called and spoke with patient - let him know that a PET scan was ordered for patient - he asked for help scheduling appointment- scheduled for 9/13/22 at 6:25 am- pt asked that instructions be sent via tsumobi for patient to read. Instructions sent. Patient in agreement with plan/ date and time of appointment.

## 2022-09-08 ENCOUNTER — OFFICE VISIT (OUTPATIENT)
Dept: FAMILY MEDICINE CLINIC | Facility: CLINIC | Age: 72
End: 2022-09-08
Payer: COMMERCIAL

## 2022-09-08 VITALS
HEART RATE: 99 BPM | HEIGHT: 74 IN | BODY MASS INDEX: 29.52 KG/M2 | RESPIRATION RATE: 16 BRPM | DIASTOLIC BLOOD PRESSURE: 44 MMHG | WEIGHT: 230 LBS | OXYGEN SATURATION: 95 % | SYSTOLIC BLOOD PRESSURE: 79 MMHG

## 2022-09-08 DIAGNOSIS — M54.30 SCIATICA, UNSPECIFIED LATERALITY: ICD-10-CM

## 2022-09-08 DIAGNOSIS — M79.604 BILATERAL LEG AND FOOT PAIN: ICD-10-CM

## 2022-09-08 DIAGNOSIS — M79.605 BILATERAL LEG AND FOOT PAIN: ICD-10-CM

## 2022-09-08 DIAGNOSIS — M79.671 BILATERAL LEG AND FOOT PAIN: ICD-10-CM

## 2022-09-08 DIAGNOSIS — I95.9 HYPOTENSION, UNSPECIFIED HYPOTENSION TYPE: ICD-10-CM

## 2022-09-08 DIAGNOSIS — R53.1 GENERALIZED WEAKNESS: Primary | ICD-10-CM

## 2022-09-08 DIAGNOSIS — Z45.02 DEFIBRILLATOR DISCHARGE: ICD-10-CM

## 2022-09-08 DIAGNOSIS — M79.672 BILATERAL LEG AND FOOT PAIN: ICD-10-CM

## 2022-09-08 PROCEDURE — 3078F DIAST BP <80 MM HG: CPT | Performed by: FAMILY MEDICINE

## 2022-09-08 PROCEDURE — 99214 OFFICE O/P EST MOD 30 MIN: CPT | Performed by: FAMILY MEDICINE

## 2022-09-08 PROCEDURE — 1126F AMNT PAIN NOTED NONE PRSNT: CPT | Performed by: FAMILY MEDICINE

## 2022-09-08 PROCEDURE — 3008F BODY MASS INDEX DOCD: CPT | Performed by: FAMILY MEDICINE

## 2022-09-08 PROCEDURE — 3074F SYST BP LT 130 MM HG: CPT | Performed by: FAMILY MEDICINE

## 2022-09-08 NOTE — PATIENT INSTRUCTIONS
And lieu of the patient's defibrillator activity which left him symptomatic and hypotensive and tachycardic and generally weak, he agrees to being admitted to [facility] as we are going to call 911 for the paramedics to come and get him for retrospective work-up regarding any cardiac event or any other acute event which has occurred to change the course of his baseline health.

## 2022-09-13 ENCOUNTER — APPOINTMENT (OUTPATIENT)
Dept: HEMATOLOGY/ONCOLOGY | Facility: HOSPITAL | Age: 72
End: 2022-09-13
Attending: INTERNAL MEDICINE
Payer: MEDICARE

## 2022-09-14 ENCOUNTER — APPOINTMENT (OUTPATIENT)
Dept: PHYSICAL THERAPY | Age: 72
End: 2022-09-14
Attending: PHYSICAL MEDICINE & REHABILITATION
Payer: MEDICARE

## 2022-09-14 ENCOUNTER — TELEPHONE (OUTPATIENT)
Dept: PHYSICAL THERAPY | Facility: HOSPITAL | Age: 72
End: 2022-09-14

## 2022-09-16 ENCOUNTER — APPOINTMENT (OUTPATIENT)
Dept: PHYSICAL THERAPY | Age: 72
End: 2022-09-16
Attending: PHYSICAL MEDICINE & REHABILITATION
Payer: MEDICARE

## 2022-09-19 ENCOUNTER — APPOINTMENT (OUTPATIENT)
Dept: PHYSICAL THERAPY | Age: 72
End: 2022-09-19
Attending: PHYSICAL MEDICINE & REHABILITATION
Payer: MEDICARE

## 2022-09-21 ENCOUNTER — APPOINTMENT (OUTPATIENT)
Dept: PHYSICAL THERAPY | Age: 72
End: 2022-09-21
Attending: PHYSICAL MEDICINE & REHABILITATION
Payer: MEDICARE

## 2022-09-24 DIAGNOSIS — E78.2 MIXED HYPERLIPIDEMIA: ICD-10-CM

## 2022-09-26 ENCOUNTER — APPOINTMENT (OUTPATIENT)
Dept: PHYSICAL THERAPY | Age: 72
End: 2022-09-26
Attending: PHYSICAL MEDICINE & REHABILITATION
Payer: MEDICARE

## 2022-09-26 RX ORDER — SIMVASTATIN 80 MG
80 TABLET ORAL NIGHTLY
Qty: 90 TABLET | Refills: 3 | Status: SHIPPED | OUTPATIENT
Start: 2022-09-26

## 2022-09-26 NOTE — TELEPHONE ENCOUNTER
Refill passed per [employer] protocol. Med passed protocol, has high interaction, cannot proceed from here  Routing as Dr. Danie Starks  is out of office     Requested Prescriptions   Pending Prescriptions Disp Refills    SIMVASTATIN 80 MG Oral Tab [Pharmacy Med Name: SIMVASTATIN  80MG  TAB] 90 tablet 3     Sig: TAKE 1 TABLET BY MOUTH AT  NIGHT        Cholesterol Medication Protocol Passed - 9/24/2022  9:36 PM        Passed - ALT in past 12 months        Passed - LDL in past 12 months        Passed - Last ALT < 80       Lab Results   Component Value Date    ALT 24 06/06/2022             Passed - Last LDL < 130     Lab Results   Component Value Date    LDL 34 09/25/2021               Passed - In person appointment or virtual visit in the past 12 mos or appointment in next 3 mos       Recent Outpatient Visits              2 weeks ago [facility]ized [facility]    [facility],     Office Visit    3 weeks ago     [place] [employer] [place], [facility], [facility], Janine Myles MD    Office Visit    1 month ago Rib cage region somatic dysfunction    MEDICAL [facility], [address], Cleo Reyes DO    Office Visit    1 month ago Lumbar radiculopathy, chronic    [facility]O [facility] Dottie Valenzuela DO    Office Visit    1 month ago Acute left lumbar radiculopathy    [facility]    Telemedicine     Future Appointments         Provider Department Appt Notes    In 2 months EM [address] - Infusion CBC, Iron&TIBS-CMP-FT LAB. PORT FLUSH. CL    In 2 months Olivier Nicholas MD [facility] [place] [facility] Hematology Oncology FOLLOW UP VISIT. CL  3m    In 4 months MICHELLE, PROCEDURE [address] GI PROCEDURE CLN/EGD @ [facility]                   Recent Outpatient Visits              2 weeks ago Generalized weakness    [address], Oskar Zepeda, [place]    Office Visit 3 weeks ago     [facility], [facility], Aislinn Maher MD    Office Visit    1 month ago Rib cage region somatic dysfunction    ANA Esquivel, [address], DO    Office Visit    1 month ago Lumbar radiculopathy, chronic    [facility] NE[facility] Crefawn Alcala, DO    Office Visit    1 month ago Acute left lumbar radiculopathy    [facility],     Telemedicine          Future Appointments         Provider Department Appt Notes    In 2 months EM [address] - Infusion CBC, Iron&TIBS-CMP-FT LAB. PORT FLUSH. CL    In 2 months Harrison Guajardo MD [facility] CLINICS Hematology Oncology FOLLOW UP VISIT. CL  3m    In 4 months MICHELLE, PROCEDURE [facility] GI PROCEDURE CLN/EGD @ [address] [place] [address]

## 2022-09-28 ENCOUNTER — APPOINTMENT (OUTPATIENT)
Dept: PHYSICAL THERAPY | Age: 72
End: 2022-09-28
Attending: PHYSICAL MEDICINE & REHABILITATION
Payer: MEDICARE

## 2022-10-03 ENCOUNTER — APPOINTMENT (OUTPATIENT)
Dept: PHYSICAL THERAPY | Age: 72
End: 2022-10-03
Attending: PHYSICAL MEDICINE & REHABILITATION
Payer: MEDICARE

## 2022-10-05 ENCOUNTER — APPOINTMENT (OUTPATIENT)
Dept: PHYSICAL THERAPY | Age: 72
End: 2022-10-05
Attending: PHYSICAL MEDICINE & REHABILITATION
Payer: MEDICARE

## 2022-10-07 ENCOUNTER — TELEPHONE (OUTPATIENT)
Dept: HEMATOLOGY/ONCOLOGY | Facility: HOSPITAL | Age: 72
End: 2022-10-07

## 2022-10-07 DIAGNOSIS — C43.9 MALIGNANT MELANOMA, UNSPECIFIED SITE (HCC): Primary | ICD-10-CM

## 2022-10-07 NOTE — TELEPHONE ENCOUNTER
Patient discharged from [facility] today- spoke with patient and let him know PET scan ordered for patient and that he should call and schedule - he will schedule and I will call on Monday and schedule a follow up post PET scan. Patient stated understanding.

## 2022-10-07 NOTE — PROGRESS NOTES
Next generation genomic sequencing order sent in online to [facility];  Specimen [id number] from [facility] [9/13/22 ] to be tested;

## 2022-12-06 ENCOUNTER — APPOINTMENT (OUTPATIENT)
Dept: HEMATOLOGY/ONCOLOGY | Facility: HOSPITAL | Age: 72
End: 2022-12-06
Attending: INTERNAL MEDICINE
Payer: MEDICARE

## 2025-02-10 NOTE — PATIENT INSTRUCTIONS
-My office will call once injection is approved  -Tramadol and gabapentin to be continued for now  -Start PT as scheduled  -Xray of the ribs on the way out today
What Is The Reason For Today's Visit?: Preventative Skin Check

## (undated) DIAGNOSIS — C43.70 MALIGNANT MELANOMA OF LOWER EXTREMITY, INCLUDING HIP, UNSPECIFIED LATERALITY (HCC): ICD-10-CM

## (undated) DIAGNOSIS — E78.2 MIXED HYPERLIPIDEMIA: ICD-10-CM

## (undated) DEVICE — MINOR GENERAL: Brand: MEDLINE INDUSTRIES, INC.

## (undated) DEVICE — STERILE (10.2 X 147CM) TELESCOPICALLY-FOLDED COVER: Brand: CIV-FLEX™ TRANSDUCER COVER

## (undated) DEVICE — ENCORE® LATEX ACCLAIM SIZE 8, STERILE LATEX POWDER-FREE SURGICAL GLOVE: Brand: ENCORE

## (undated) DEVICE — DRAPE SHEET LG

## (undated) DEVICE — NEEDLE HPO 27GA .5IN BD REG

## (undated) DEVICE — Device

## (undated) DEVICE — NITINOL WIRE STR 035

## (undated) DEVICE — DRAPE,EXTREMITY,89X128,STERILE: Brand: MEDLINE

## (undated) DEVICE — GAMMEX® PI HYBRID SIZE 7.5, STERILE POWDER-FREE SURGICAL GLOVE, POLYISOPRENE AND NEOPRENE BLEND: Brand: GAMMEX

## (undated) DEVICE — SNARE CAPTIFLEX MICRO-OVL OLY

## (undated) DEVICE — TRAY SKIN PREP PVP-1

## (undated) DEVICE — UROLOGY DRAIN BAG

## (undated) DEVICE — SPONGE: SPECIALTY PEANUT XR 100/CS: Brand: MEDICAL ACTION INDUSTRIES

## (undated) DEVICE — SUTURE VICRYL 0 CP-1

## (undated) DEVICE — SOL  .9 1000ML BTL

## (undated) DEVICE — ENCORE® LATEX MICRO SIZE 8, STERILE LATEX POWDER-FREE SURGICAL GLOVE: Brand: ENCORE

## (undated) DEVICE — TOWEL OR BLU 16X26 STRL

## (undated) DEVICE — COVER PRB NEOGUARD 30X2.6CM US

## (undated) DEVICE — SOL H2O 1000ML BTL

## (undated) DEVICE — SUTURE SILK 4-0 SA63H

## (undated) DEVICE — ENDOSCOPY PACK - LOWER: Brand: MEDLINE INDUSTRIES, INC.

## (undated) DEVICE — CAUTERY BLADE 2IN INS E1455

## (undated) DEVICE — SUTURE VICRYL 3-0 SH

## (undated) DEVICE — BLADE, DERMATOME (FOR MODELS S, SB, B AND C)(6 LANGUAGES): Brand: INTEGRA® PADGETT® DERMATOMES

## (undated) DEVICE — CLIP SM INTNL HMCLP TNTLM ESCP

## (undated) DEVICE — SUCTION CANISTER, 3000CC,SAFELINER: Brand: DEROYAL

## (undated) DEVICE — SOL  .9 3000ML

## (undated) DEVICE — DRAPE SHEET TRANSVERSE LAP

## (undated) DEVICE — CONTAINER SPEC STR 4OZ GRY LID

## (undated) DEVICE — UNDYED BRAIDED (POLYGLACTIN 910), SYNTHETIC ABSORBABLE SUTURE: Brand: COATED VICRYL

## (undated) DEVICE — CHLORAPREP 26ML APPLICATOR

## (undated) DEVICE — BLADE 11 SHRP BP SS SRG STRL

## (undated) DEVICE — CYSTO PACK: Brand: MEDLINE INDUSTRIES, INC.

## (undated) DEVICE — MEDI-VAC NON-CONDUCTIVE SUCTION TUBING: Brand: CARDINAL HEALTH

## (undated) DEVICE — Device: Brand: DEFENDO AIR/WATER/SUCTION AND BIOPSY VALVE

## (undated) DEVICE — BAG DRAIN INFECTION CNTRL 2000

## (undated) DEVICE — SUPER SPONGES,MEDIUM: Brand: KERLIX

## (undated) DEVICE — GOWN SURG AERO BLUE PERF LG

## (undated) DEVICE — DRAPE C-ARM UNIVERSAL

## (undated) DEVICE — DRAPE SRG UNV 131X90IN LWR

## (undated) DEVICE — SUTURE VICRYL 2-0 FS-1

## (undated) DEVICE — DERMABOND LIQUID ADHESIVE

## (undated) DEVICE — 6 ML SYRINGE LUER-LOCK TIP: Brand: MONOJECT

## (undated) DEVICE — PROXIMATE RH ROTATING HEAD SKIN STAPLERS (35 WIDE) CONTAINS 35 STAINLESS STEEL STAPLES: Brand: PROXIMATE

## (undated) DEVICE — CLIP RESOLUTION 235CM

## (undated) DEVICE — SUTURE PROLENE 3-0 8832H

## (undated) DEVICE — SUTURE SILK 0 FSL

## (undated) DEVICE — 12 ML SYRINGE LUER-LOCK TIP: Brand: MONOJECT

## (undated) DEVICE — 20 ML SYRINGE LUER-LOCK TIP: Brand: MONOJECT

## (undated) DEVICE — TRAP 4 CPTR CHMBR N EZ INLN

## (undated) DEVICE — CLIP SM INTNL HMCLP TI ESCP

## (undated) DEVICE — SUTURE VICRYL 2-0 SH

## (undated) NOTE — LETTER
Pam Bird [address], [place]  [address]  INFORMED CONSENT FOR TRANSFUSION OF BLOOD OR BLOOD PRODUCTS  My physician has informed me of the nature, purpose, benefits and risks of transfusion for blood and blood components that ______________________________________________  (Signature of Patient)                                                            (Responsible party in case of Minor,

## (undated) NOTE — LETTER
19      Patient: Zoë Vargas  : 1950 Visit date: 2019    Dear Jerry Marr,      I examined your patient in follow-up today.     He is 68 days post left foot melanoma excision with sentinel node biopsy and split-thickness s

## (undated) NOTE — LETTER
Printed: 2019    Patient Name: Yemi Valenzuela  : 1950   Medical Record #: [id number]    Consent to [address] [place] [address], understand that I have been diagnosed with melanoma (stage IIIC).     I understand that the treatme time if I have questions, by calling [facility] at [phone]. Additional written information will be given to me prior to start of therapy. Additionally, I will receive a copy of this consent form.     I have read and fully understand th

## (undated) NOTE — LETTER
[facility]  Administration of Anesthesia  1. I, Zoë Flair, or _________________________________ acting on his behalf, (Patient) (Dependent/Representative) request to receive anesthesia for my pending procedure/operation/treatment. infections, high spinal block, spinal bleeding, seizure, cardiac arrest and death. 7. AWARENESS: I understand that it is possible (but unlikely) to have explicit memory of events from the operating room while under general anesthesia.   8. ELECTROCONVULSIV unconscious pt /Relationship    My signature below affirms that prior to the time of the procedure, I have explained to the patient and/or his/her guardian, the risks and benefits of undergoing anesthesia, as well as any reasonable alternatives.     _______

## (undated) NOTE — LETTER
19      Patient: Bishop Hunt  : 1950 Visit date: 2019    Dear Pati Self,      I examined your patient in consultation today. He has a melanoma on the plantar aspect of the foot.   After your resection procedure, we will

## (undated) NOTE — ED AVS SNAPSHOT
[facility] Emergency Department    [place] [address]    Phone:  [phone]    Fax:  [phone]           Chet Max   MRN: [id number]    Department:  [facility] Emergency Department   Date of Visit:  3/2 Insurance plans vary and the physician(s) referred by the ER may not be covered by your plan. Please contact your insurance company to determine coverage and benefits available for follow-up care and referrals.       If you have difficulty scheduling your prescription right away and begin taking the medication(s) as directed.   If you believe that any of the medications or instructions on this list is different from what your Primary Care doctor has instructed you - please continue to take your medications a Patient [address] to help you get signed up for insurance coverage. Patient [address] is a Federal Navigator program that can help with your Affordable Care Act coverage, as well as all types of Medicaid plans.   To get signed up and covere

## (undated) NOTE — LETTER
19      Patient: Chet Max  : 1950 Visit date: 2019    Dear Wing Yeager,      I examined your patient in follow-up today.     He is 6 months post wide excision of a melanoma of the left foot, sentinel node biopsy, and sp

## (undated) NOTE — ED AVS SNAPSHOT
[facility] Emergency Department    [address]     [address] [place] [address]    Phone:  [phone]    Fax:  [phone]           Kavin Milligan   MRN: [id number]    Department:  [facility] Emergency Department   Date of Visit:  3/2 and Class Registration line at [phone] or find a doctor online by visiting [email or web address].    IF THERE IS ANY CHANGE OR WORSENING OF YOUR CONDITION, CALL YOUR PRIMARY CARE PHYSICIAN AT ONCE OR RETURN IMMEDIATELY TO [address] [place] [address].     If

## (undated) NOTE — LETTER
[address] [place] [address] Rd, [place], [place]     AUTHORIZATION FOR SURGICAL OPERATION OR PROCEDURE    I hereby authorize Dr. Heladio Carballo MD, my Physician(s) and whomever may be designated as the doctor's Assistant, to perform the follo Physician. 4. I consent to the photographing of procedure(s) to be performed for the purposes of advancing medicine, science and/or education, provided my identity is not revealed.  If the procedure has been videotaped, the physician/surgeon will obtain th (Witness signature)                                                                                                  (Date)                                (Time)  STATEMENT OF PHYSICIAN My signature below affirms that prior to the time of the procedure;  I

## (undated) NOTE — LETTER
[address], [place], [place]     AUTHORIZATION FOR SURGICAL OPERATION OR PROCEDURE    I hereby authorize Dr. Chirag Huffman MD,, my Physician(s) and whomever may be designated as the doctor's Assistant, to perform th 4. I consent to the photographing of procedure(s) to be performed for the purposes of advancing medicine, science and/or education, provided my identity is not revealed.  If the procedure has been videotaped, the physician/surgeon will obtain the original v (Witness signature)                                                                                                  (Date)                                (Time)  STATEMENT OF PHYSICIAN My signature below affirms that prior to the time of the procedure;  I

## (undated) NOTE — LETTER
No referring provider defined for this encounter. 02/12/19        Patient: Sofya Carey   YOB: 1950   Date of Visit: 2/6/2019       Dear  Dr. Fatimah Salazar, DO,      Thank you for referring Sofay Carey to my practice.   Please f

## (undated) NOTE — LETTER
[address] [place] [address] Rd, [place], [place]     AUTHORIZATION FOR SURGICAL OPERATION OR PROCEDURE    1.  I hereby authorize Dr. Radha Gottlieb MD, my Physician(s) and whomever may be designated as the doctor's Assistant, to perform 5. I consent to the photographing of procedure(s) to be performed for the purposes of advancing medicine, science and/or education, provided my identity is not revealed.  If the procedure has been videotaped, the physician/surgeon will obtain the original v (Witness signature)                                                                                                  (Date)                                (Time)  STATEMENT OF PHYSICIAN My signature below affirms that prior to the time of the procedure;  I

## (undated) NOTE — MR AVS SNAPSHOT
After Visit Summary   2/11/2020    Delvin Ann    MRN: [id number]           Visit Information     Date & Time  2/11/2020 12:00 PM Provider   [address] - Infusion Dept.  Phone  [phone]      Your Provider Department    2/19/2020 9:00 AM Erasmo, [facility], [address] [facility] [address], [place]    3/3/2020 9:30 AM EM CC [address] - Infusion    3/3/2020 10:30 AM Isabela Ladd [facility] Hematology On Communicate with a [facility] Physician or ROSI online. The physician will respond and provide   a treatment plan within a few hours.    ONLINE VISIT  Primary Care Providers  Treatment for mild illness or injury that does not require immediate attention  VIDEO VISI

## (undated) NOTE — LETTER
No referring provider defined for this encounter. 05/11/19        Patient: Sonny Williamson   YOB: 1950   Date of Visit: 5/10/2019       Dear  Dr. Camron Bonner MD,      Thank you for referring Sonny Williamson to my practice.   Please fi

## (undated) NOTE — MR AVS SNAPSHOT
After Visit Summary   10/6/2021    [facility]   MRN: [id number]           Visit Information     Date & Time  10/6/2021  1:45 PM Provider  Vanessa Mims MD [address] [place] [address], [address] [place] [address],[address], [employer]  Phone  3 Cap Take 1 capsule by mouth daily. Ergocalciferol (VITAMIN D2) 400 units Oral Tab Take 1 tablet by mouth daily. diphenhydrAMINE HCl 25 MG Oral Tab Take 25 mg by mouth every 8 (eight) hours as needed.       Diagnoses for This Visit    Prostate cancer

## (undated) NOTE — ED AVS SNAPSHOT
Kavin Milligan   MRN: [id number]    Department:  [facility] Emergency Department   Date of Visit:  11/16/2019           Disclosure     Insurance plans vary and the physician(s) referred by the ER may not be covered by your plan.  Please contac within the next three months to obtain basic health screening including reassessment of your blood pressure.     IF THERE IS ANY CHANGE OR WORSENING OF YOUR CONDITION, CALL YOUR PRIMARY CARE PHYSICIAN AT ONCE OR RETURN IMMEDIATELY TO THE EMERGENCY DEPARTMEN

## (undated) NOTE — LETTER
[address] [place] [address] Rd, [place], [place]     AUTHORIZATION FOR SURGICAL OPERATION OR PROCEDURE    I hereby authorize Dr. Jeffry Castro MD, my Physician(s) and whomever may be designated as the doctor's Assistant, to perform the follo 4. I consent to the photographing of procedure(s) to be performed for the purposes of advancing medicine, science and/or education, provided my identity is not revealed.  If the procedure has been videotaped, the physician/surgeon will obtain the original v (Witness signature)                                                                                                  (Date)                                (Time)  STATEMENT OF PHYSICIAN My signature below affirms that prior to the time of the procedure;  I

## (undated) NOTE — LETTER
No referring provider defined for this encounter. 03/29/19        Patient: Skye Dao   YOB: 1950   Date of Visit: 3/29/2019       Dear  Dr. Hermila Doran, DO,      Thank you for referring Skye Dao to my practice.   Please

## (undated) NOTE — LETTER
3/1/2019              [address]        [place] F[place]         Dear Dr. Cara Kang,    Our mutual patient Silvana Hodge ( 1950) is scheduled for surgery on 2019.  I am requesting Cardiac clearance as w

## (undated) NOTE — LETTER
3/29/2019          To Whom It May Concern:    Monika Galeas is currently under my medical care and may continue to work with light duty restrictions. Activity is restricted as follows:  No heavy lifting over 15 pounds and no strenuous activity such as be

## (undated) NOTE — LETTER
No referring provider defined for this encounter. 01/17/20        Patient: Kavin Milligan   YOB: 1950   Date of Visit: 1/17/2020       Dear  Dr. Matheus Guadalupe, DO,      Thank you for referring Kavin Milligan to my practice.   Please

## (undated) NOTE — LETTER
[address] [place] [address], [place], [place]     AUTHORIZATION FOR SURGICAL OPERATION OR PROCEDURE    1.  I hereby authorize Dr. Jennifer Jimenez MD; Dr. Dianne Loyola MD, my Physician(s) and whomever may be designated as the doctor Physician. 5. I consent to the photographing of procedure(s) to be performed for the purposes of advancing medicine, science and/or education, provided my identity is not revealed.  If the procedure has been videotaped, the physician/surgeon will obtain th (Witness signature)                                                                                                  (Date)                                (Time)  STATEMENT OF PHYSICIAN My signature below affirms that prior to the time of the procedure;  I

## (undated) NOTE — LETTER
19      Patient: Narcisa Hardwick  : 1950 Visit date: 2019    Dear Yolie Nolasco,      I examined your patient in follow-up today. He is 7 weeks post wide excision of a melanoma left foot, with skin graft reconstruction.     A